# Patient Record
Sex: MALE | Race: WHITE | Employment: OTHER | URBAN - METROPOLITAN AREA
[De-identification: names, ages, dates, MRNs, and addresses within clinical notes are randomized per-mention and may not be internally consistent; named-entity substitution may affect disease eponyms.]

---

## 2017-04-04 ENCOUNTER — OFFICE VISIT (OUTPATIENT)
Dept: ORTHOPEDICS | Facility: CLINIC | Age: 57
End: 2017-04-04
Payer: COMMERCIAL

## 2017-04-04 ENCOUNTER — RADIANT APPOINTMENT (OUTPATIENT)
Dept: GENERAL RADIOLOGY | Facility: CLINIC | Age: 57
End: 2017-04-04
Attending: PEDIATRICS
Payer: COMMERCIAL

## 2017-04-04 VITALS
DIASTOLIC BLOOD PRESSURE: 82 MMHG | HEIGHT: 69 IN | SYSTOLIC BLOOD PRESSURE: 116 MMHG | WEIGHT: 165 LBS | BODY MASS INDEX: 24.44 KG/M2

## 2017-04-04 DIAGNOSIS — M19.272 SECONDARY OSTEOARTHRITIS OF LEFT ANKLE: ICD-10-CM

## 2017-04-04 DIAGNOSIS — M25.572 LEFT ANKLE PAIN, UNSPECIFIED CHRONICITY: ICD-10-CM

## 2017-04-04 DIAGNOSIS — M25.572 LEFT ANKLE PAIN, UNSPECIFIED CHRONICITY: Primary | ICD-10-CM

## 2017-04-04 PROCEDURE — 73610 X-RAY EXAM OF ANKLE: CPT | Mod: LT | Performed by: PEDIATRICS

## 2017-04-04 PROCEDURE — 99203 OFFICE O/P NEW LOW 30 MIN: CPT | Performed by: PEDIATRICS

## 2017-04-04 NOTE — NURSING NOTE
"Chief Complaint   Patient presents with     Musculoskeletal Problem     left ankle injury       Initial /82  Ht 5' 9\" (1.753 m)  Wt 165 lb (74.8 kg)  BMI 24.37 kg/m2 Estimated body mass index is 24.37 kg/(m^2) as calculated from the following:    Height as of this encounter: 5' 9\" (1.753 m).    Weight as of this encounter: 165 lb (74.8 kg).  Medication Reconciliation: complete  "

## 2017-04-04 NOTE — Clinical Note
Case OCASIO was seen in [facility] for his left ankle arthrosis. He prefers to mostly monitor for now. Please see copy of the chart note for additional details. Thanks.
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
## 2017-04-04 NOTE — MR AVS SNAPSHOT
After Visit Summary   4/4/2017    Case Epstein    MRN: [id number]           Patient Information     Date Of Birth          1960        Visit Information        Provider Department      4/4/2017 11:20 AM Rubens Graham,  [facility]        Today's Diagnoses     Left ankle pain, unspecified chronicity    -  1    Secondary osteoarthritis of left ankle          Care Instructions    Crutches and wrap as needed  Ice, elevate as needed  Call if questions--consider injection, therapy, seeing foot/ankle surgeon        Follow-ups after your visit        Follow-up notes from your care team     Return if symptoms worsen or fail to improve.      Your next 10 appointments already scheduled     Apr 05, 2017  6:40 AM CDT   Office Visit with Theresa Galvan PA-C   [facility]    [address] [place] [id number] [phone]           Bring a current list of meds and any records pertaining to this visit.  For Physicals, please bring immunization records and any forms needing to be filled out.  Please arrive 10 minutes early to complete paperwork.              Who to contact     If you have questions or need follow up information about today's clinic visit or your schedule please contact [facility] directly at [phone].  Normal or non-critical lab and imaging results will be communicated to you by MyChart, letter or phone within 4 business days after the clinic has received the results. If you do not hear from us within 7 days, please contact the clinic through MyChart or phone. If you have a critical or abnormal lab result, we will notify you by phone as soon as possible.  Submit refill requests through [employer] or call your pharmacy and they will forward the refill request to us. Please allow 3 business days for your refill to be completed.  "         Additional Information About Your Visit        MyChart Information     [employer] gives you secure access to your electronic health record. If you see a primary care provider, you can also send messages to your care team and make appointments. If you have questions, please call your primary care clinic.  If you do not have a primary care provider, please call [phone] and they will assist you.        Care EveryWhere ID     This is your Care EveryWhere ID. This could be used by other organizations to access your [place] medical records  [id number]        Your Vitals Were     Height BMI (Body Mass Index)                5' 9\" (1.753 m) 24.37 kg/m2           Blood Pressure from Last 3 Encounters:   04/04/17 116/82   07/22/16 123/83   07/03/15 122/81    Weight from Last 3 Encounters:   04/04/17 165 lb (74.8 kg)   07/22/16 158 lb (71.7 kg)   07/03/15 169 lb (76.7 kg)              We Performed the Following     ELASTIC COMPRESSION BANDAGE          Today's Medication Changes          These changes are accurate as of: 4/4/17  1:11 PM.  If you have any questions, ask your nurse or doctor.               Start taking these medicines.        Dose/Directions    order for DME   Used for:  Left ankle pain, unspecified chronicity, Secondary osteoarthritis of left ankle   Started by:  Rubens Graham, DO        Equipment being ordered: crutches, adult   Quantity:  1 Device   Refills:  0            Where to get your medicines      Some of these will need a paper prescription and others can be bought over the counter.  Ask your nurse if you have questions.     Bring a paper prescription for each of these medications     order for DME                Primary Care Provider Office Phone # Fax #    Tushar Granados [phone]       [facility] [address] [facility] [address]        Thank you!     Thank you for choosing [facility]  for your " care. Our goal is always to provide you with excellent care. Hearing back from our patients is one way we can continue to improve our services. Please take a few minutes to complete the written survey that you may receive in the mail after your visit with us. Thank you!             Your Updated Medication List - Protect others around you: Learn how to safely use, store and throw away your medicines at www.disposemymeds.org.          This list is accurate as of: 4/4/17  1:11 PM.  Always use your most recent med list.                   Brand Name Dispense Instructions for use    ALEVE 220 MG tablet   Generic drug:  naproxen sodium      Take 2 tablets by mouth as needed.       aspirin 81 MG tablet      Take 1 tablet by mouth daily.       CALTRATE 600 PLUS-VIT D OR      Daily       MULTIVITAMIN TABS   OR      1 TABLET DAILY       order for DME     1 Device    Equipment being ordered: crutches, adult       vitamin D 1000 UNITS capsule      1 CAPSULE DAILY

## 2017-04-04 NOTE — PATIENT INSTRUCTIONS
Crutches and wrap as needed  Ice, elevate as needed  Call if questions--consider injection, therapy, seeing foot/ankle surgeon

## 2017-06-28 ENCOUNTER — TELEPHONE (OUTPATIENT)
Dept: ORTHOPEDICS | Facility: CLINIC | Age: 57
End: 2017-06-28

## 2017-06-28 NOTE — TELEPHONE ENCOUNTER
LVM for patient to call back to reschedule injection for US guided inection  Radha Peraza MS ATC

## 2017-06-28 NOTE — TELEPHONE ENCOUNTER
LVM for patient has he is scheduled for an US guided injection with Dr. Graham, and he would need to be rescheduled.  Dr. Lewis is available on Saturday 7/17/17  Radha Peraza MS, ATC

## 2017-07-12 ENCOUNTER — OFFICE VISIT (OUTPATIENT)
Dept: ORTHOPEDICS | Facility: CLINIC | Age: 57
End: 2017-07-12
Payer: COMMERCIAL

## 2017-07-12 VITALS
DIASTOLIC BLOOD PRESSURE: 82 MMHG | WEIGHT: 165 LBS | BODY MASS INDEX: 24.44 KG/M2 | HEIGHT: 69 IN | SYSTOLIC BLOOD PRESSURE: 122 MMHG

## 2017-07-12 DIAGNOSIS — M25.572 LEFT ANKLE PAIN, UNSPECIFIED CHRONICITY: Primary | ICD-10-CM

## 2017-07-12 DIAGNOSIS — M19.272 SECONDARY OSTEOARTHRITIS OF LEFT ANKLE: ICD-10-CM

## 2017-07-12 PROCEDURE — 20611 DRAIN/INJ JOINT/BURSA W/US: CPT | Mod: LT | Performed by: FAMILY MEDICINE

## 2017-07-12 RX ORDER — TRIAMCINOLONE ACETONIDE 40 MG/ML
40 INJECTION, SUSPENSION INTRA-ARTICULAR; INTRAMUSCULAR ONCE
Qty: 1 ML | Refills: 0 | OUTPATIENT
Start: 2017-07-12 | End: 2017-07-12

## 2017-07-12 NOTE — MR AVS SNAPSHOT
"              After Visit Summary   7/12/2017    Case Epstein    MRN: [id number]           Patient Information     Date Of Birth          1960        Visit Information        Provider Department      7/12/2017 2:20 PM Rubens Reynoso DO [facility]        Today's Diagnoses     Left ankle pain, unspecified chronicity    -  1    Secondary osteoarthritis of left ankle           Follow-ups after your visit        Who to contact     If you have questions or need follow up information about today's clinic visit or your schedule please contact [facility] directly at [phone].  Normal or non-critical lab and imaging results will be communicated to you by [employer], letter or phone within 4 business days after the clinic has received the results. If you do not hear from us within 7 days, please contact the clinic through [employer] or phone. If you have a critical or abnormal lab result, we will notify you by phone as soon as possible.  Submit refill requests through [employer] or call your pharmacy and they will forward the refill request to us. Please allow 3 business days for your refill to be completed.          Additional Information About Your Visit        MyChart Information     [employer] gives you secure access to your electronic health record. If you see a primary care provider, you can also send messages to your care team and make appointments. If you have questions, please call your primary care clinic.  If you do not have a primary care provider, please call [phone] and they will assist you.        Care EveryWhere ID     This is your Care EveryWhere ID. This could be used by other organizations to access your [place] medical records  [id number]        Your Vitals Were     Height BMI (Body Mass Index)                5' 9\" (1.753 m) 24.37 kg/m2           Blood Pressure from Last 3 Encounters:   07/12/17 122/82   04/04/17 116/82 "   07/22/16 123/83    Weight from Last 3 Encounters:   07/12/17 165 lb (74.8 kg)   04/04/17 165 lb (74.8 kg)   07/22/16 158 lb (71.7 kg)              We Performed the Following     HC ARTHROCENTESIS ASPIR&/INJ MAJOR JT/BURSA W/US     TRIAMCINOLONE ACET INJ NOS          Today's Medication Changes          These changes are accurate as of: 7/12/17  3:09 PM.  If you have any questions, ask your nurse or doctor.               Start taking these medicines.        Dose/Directions    triamcinolone acetonide 40 MG/ML injection   Commonly known as:  KENALOG-40   Used for:  Left ankle pain, unspecified chronicity, Secondary osteoarthritis of left ankle   Started by:  Rubens Reynoso DO        Dose:  40 mg   1 mL (40 mg) by INTRA-ARTICULAR route once for 1 dose   Quantity:  1 mL   Refills:  0            Where to get your medicines      Some of these will need a paper prescription and others can be bought over the counter.  Ask your nurse if you have questions.     You don't need a prescription for these medications     triamcinolone acetonide 40 MG/ML injection                Primary Care Provider Office Phone # Fax #    Tushar Granados [phone]       [address] [facility] [address]        Equal Access to Services     JOVAN MATT AH: Marino leoso Soamparoali, waaxda luqadaha, qaybta kaalmada adeegyada, carmencita easton. So [facility] [phone].    ATENCIÓN: Si habla español, tiene a cifuentes disposición servicios gratuitos de asistencia lingüística. Llame al [phone].    We comply with applicable federal civil rights laws and [place] laws. We do not discriminate on the basis of race, color, national origin, age, disability sex, sexual orientation or gender identity.            Thank you!     Thank you for choosing [facility]  for your care. Our goal is always to provide you with excellent care.  Hearing back from our patients is one way we can continue to improve our services. Please take a few minutes to complete the written survey that you may receive in the mail after your visit with us. Thank you!             Your Updated Medication List - Protect others around you: Learn how to safely use, store and throw away your medicines at www.disposemymeds.org.          This list is accurate as of: 7/12/17  3:09 PM.  Always use your most recent med list.                   Brand Name Dispense Instructions for use Diagnosis    ALEVE 220 MG tablet   Generic drug:  naproxen sodium      Take 2 tablets by mouth as needed.        aspirin 81 MG tablet      Take 1 tablet by mouth daily.        CALTRATE 600 PLUS-VIT D OR      Daily        MULTIVITAMIN TABS   OR      1 TABLET DAILY        order for DME     1 Device    Equipment being ordered: crutches, adult    Left ankle pain, unspecified chronicity, Secondary osteoarthritis of left ankle       triamcinolone acetonide 40 MG/ML injection    KENALOG-40    1 mL    1 mL (40 mg) by INTRA-ARTICULAR route once for 1 dose    Left ankle pain, unspecified chronicity, Secondary osteoarthritis of left ankle       vitamin D 1000 UNITS capsule      1 CAPSULE DAILY

## 2017-07-12 NOTE — PROGRESS NOTES
Case Epstein  :  1960  DOS: 2017  MRN: [id number]    Sports Medicine Clinic Procedure    Ultrasound Guided Left Intra-Articular Ankle Injection    Clinical History: Patient is here for chronic left ankle pain.  He walks on outside of left foot with ankle slightly inverted.  Previously seen by Dr Graham on 17 d/t increased pain following a fall.  He continues to have mild-moderate discomfort and is desiring injection today, as directed by Dr Graham.    Diagnosis:   1. Left ankle pain, unspecified chronicity    2. Secondary osteoarthritis of left ankle      Referring Physician: Rubens Graham D.O.  Technique: The risks of the procedure were explained to the patient.  A consent was signed for the intra-articular ankle injection.  The patient was evaluated with a [employer] ultrasound machine using a 12 MHz linear probe.     The Left ankle was prepped and draped in a sterile manner.  Ultrasound identification of the ankle joint in short axis to the lower leg and the probe was positioned in this manner.  A 1.5 inch 25 gauge needle was placed under ultrasound guidance into the ankle joint.  A mixture of 2mL's 1% lidocaine and 1 mL 40 mg kenalog was injected without difficulty on short axis view.  The needle was removed and there was good hemostasis without complications.  There was ultrasound documentation of needle placement and injection.  Pre-procedural pain 7/10.  Post procedural pain 4/10.    Impression:  Successful Left intra-articular ankle injection.    Plan:  Follow with Dr Graham as previously discussed.  Call in 2 weeks if no benefit  Expectations and goals of CSI reviewed  Often 2-3 days for steroid effect, and can take up to two weeks for maximum effect  We discussed modified progressive pain-free activity as tolerated  Do not overuse in first two weeks if feeling better due to concern for vulnerability while steroid is working  Supportive care reviewed  All questions were  answered today  Contact us with additional questions or concerns  Signs and sx of concern reviewed      Rubens Reynoso DO, DONNA  Primary Care Sports Medicine  [facility]

## 2017-07-12 NOTE — NURSING NOTE
"Chief Complaint   Patient presents with     Musculoskeletal Problem     chronic left ankle pain - US injection       Initial /82  Ht 5' 9\" (1.753 m)  Wt 165 lb (74.8 kg)  BMI 24.37 kg/m2 Estimated body mass index is 24.37 kg/(m^2) as calculated from the following:    Height as of this encounter: 5' 9\" (1.753 m).    Weight as of this encounter: 165 lb (74.8 kg).  Medication Reconciliation: complete     Finesse Parnell, ATC  "

## 2017-07-25 ENCOUNTER — MYC MEDICAL ADVICE (OUTPATIENT)
Dept: FAMILY MEDICINE | Facility: CLINIC | Age: 57
End: 2017-07-25

## 2017-07-25 DIAGNOSIS — M25.572 LEFT ANKLE PAIN, UNSPECIFIED CHRONICITY: Primary | ICD-10-CM

## 2017-07-26 NOTE — TELEPHONE ENCOUNTER
Please see MyChart message below, patient looking for advice on surgery.    Routed to PCP.    Olive eDng RN  [facility]

## 2017-07-27 ENCOUNTER — MYC MEDICAL ADVICE (OUTPATIENT)
Dept: ORTHOPEDICS | Facility: CLINIC | Age: 57
End: 2017-07-27

## 2017-08-01 NOTE — TELEPHONE ENCOUNTER
My Chart message from patient:    I decided to contact  Dr. Linn at [facility].  ----- Message -----  From: Tushar Granados MD  Sent: 7/27/2017  6:56 AM CDT  To: Case Epstein  Subject: RE: Updates about my health    Hi Mr Epstein  Yes I agree with seeing a foot/ankle surgeon, at least for their opinion.  I did a referral.  Two good options are Dr Aguila at [facility] [phone] or Dr. Linn at [facility] [phone].  If the sports medicine doctor has other recommendations that is okay also.  I did look at the xrays, lots of arthritis.  Take care  Tushar Granados MD

## 2017-08-03 ENCOUNTER — MYC MEDICAL ADVICE (OUTPATIENT)
Dept: FAMILY MEDICINE | Facility: CLINIC | Age: 57
End: 2017-08-03

## 2017-08-09 ENCOUNTER — MYC MEDICAL ADVICE (OUTPATIENT)
Dept: FAMILY MEDICINE | Facility: CLINIC | Age: 57
End: 2017-08-09

## 2017-08-10 NOTE — TELEPHONE ENCOUNTER
Patient states wife dropped off CRUZ on Monday 8/7. Patient on his way to  now.  Thank you,  Joann ZHANG.  Patient Rep.  [facility]

## 2017-08-16 ENCOUNTER — OFFICE VISIT (OUTPATIENT)
Dept: FAMILY MEDICINE | Facility: CLINIC | Age: 57
End: 2017-08-16
Payer: COMMERCIAL

## 2017-08-16 VITALS
OXYGEN SATURATION: 98 % | BODY MASS INDEX: 24.48 KG/M2 | HEIGHT: 70 IN | HEART RATE: 79 BPM | DIASTOLIC BLOOD PRESSURE: 88 MMHG | TEMPERATURE: 97.7 F | SYSTOLIC BLOOD PRESSURE: 131 MMHG | WEIGHT: 171 LBS

## 2017-08-16 DIAGNOSIS — Z00.00 ROUTINE GENERAL MEDICAL EXAMINATION AT A HEALTH CARE FACILITY: Primary | ICD-10-CM

## 2017-08-16 DIAGNOSIS — R73.01 IMPAIRED FASTING GLUCOSE: ICD-10-CM

## 2017-08-16 DIAGNOSIS — Z13.6 CARDIOVASCULAR SCREENING; LDL GOAL LESS THAN 100: ICD-10-CM

## 2017-08-16 DIAGNOSIS — Z12.5 SCREENING FOR PROSTATE CANCER: ICD-10-CM

## 2017-08-16 DIAGNOSIS — Z86.0100 HISTORY OF COLONIC POLYPS: ICD-10-CM

## 2017-08-16 DIAGNOSIS — R53.83 FATIGUE, UNSPECIFIED TYPE: ICD-10-CM

## 2017-08-16 LAB
ALBUMIN SERPL-MCNC: 4.1 G/DL (ref 3.4–5)
ALP SERPL-CCNC: 71 U/L (ref 40–150)
ALT SERPL W P-5'-P-CCNC: 24 U/L (ref 0–70)
ANION GAP SERPL CALCULATED.3IONS-SCNC: 9 MMOL/L (ref 3–14)
AST SERPL W P-5'-P-CCNC: 16 U/L (ref 0–45)
BASOPHILS # BLD AUTO: 0 10E9/L (ref 0–0.2)
BASOPHILS NFR BLD AUTO: 0.4 %
BILIRUB SERPL-MCNC: 0.6 MG/DL (ref 0.2–1.3)
BUN SERPL-MCNC: 15 MG/DL (ref 7–30)
CALCIUM SERPL-MCNC: 9 MG/DL (ref 8.5–10.1)
CHLORIDE SERPL-SCNC: 104 MMOL/L (ref 94–109)
CHOLEST SERPL-MCNC: 190 MG/DL
CO2 SERPL-SCNC: 25 MMOL/L (ref 20–32)
CREAT SERPL-MCNC: 0.82 MG/DL (ref 0.66–1.25)
DIFFERENTIAL METHOD BLD: NORMAL
EOSINOPHIL # BLD AUTO: 0.1 10E9/L (ref 0–0.7)
EOSINOPHIL NFR BLD AUTO: 1 %
ERYTHROCYTE [DISTWIDTH] IN BLOOD BY AUTOMATED COUNT: 13.9 % (ref 10–15)
GFR SERPL CREATININE-BSD FRML MDRD: >90 ML/MIN/1.7M2
GLUCOSE SERPL-MCNC: 93 MG/DL (ref 70–99)
HBA1C MFR BLD: 5.2 % (ref 4.3–6)
HCT VFR BLD AUTO: 45.1 % (ref 40–53)
HDLC SERPL-MCNC: 50 MG/DL
HGB BLD-MCNC: 15 G/DL (ref 13.3–17.7)
LDLC SERPL CALC-MCNC: 113 MG/DL
LYMPHOCYTES # BLD AUTO: 1.5 10E9/L (ref 0.8–5.3)
LYMPHOCYTES NFR BLD AUTO: 21.3 %
MCH RBC QN AUTO: 29.8 PG (ref 26.5–33)
MCHC RBC AUTO-ENTMCNC: 33.3 G/DL (ref 31.5–36.5)
MCV RBC AUTO: 90 FL (ref 78–100)
MONOCYTES # BLD AUTO: 0.7 10E9/L (ref 0–1.3)
MONOCYTES NFR BLD AUTO: 10.2 %
NEUTROPHILS # BLD AUTO: 4.8 10E9/L (ref 1.6–8.3)
NEUTROPHILS NFR BLD AUTO: 67.1 %
NONHDLC SERPL-MCNC: 140 MG/DL
PLATELET # BLD AUTO: 193 10E9/L (ref 150–450)
POTASSIUM SERPL-SCNC: 4.3 MMOL/L (ref 3.4–5.3)
PROT SERPL-MCNC: 7.4 G/DL (ref 6.8–8.8)
PSA SERPL-ACNC: 0.46 UG/L (ref 0–4)
RBC # BLD AUTO: 5.03 10E12/L (ref 4.4–5.9)
SODIUM SERPL-SCNC: 138 MMOL/L (ref 133–144)
TRIGL SERPL-MCNC: 134 MG/DL
TSH SERPL DL<=0.005 MIU/L-ACNC: 1.92 MU/L (ref 0.4–4)
WBC # BLD AUTO: 7.2 10E9/L (ref 4–11)

## 2017-08-16 PROCEDURE — 99396 PREV VISIT EST AGE 40-64: CPT | Performed by: FAMILY MEDICINE

## 2017-08-16 PROCEDURE — G0103 PSA SCREENING: HCPCS | Performed by: FAMILY MEDICINE

## 2017-08-16 PROCEDURE — 80061 LIPID PANEL: CPT | Performed by: FAMILY MEDICINE

## 2017-08-16 PROCEDURE — 83036 HEMOGLOBIN GLYCOSYLATED A1C: CPT | Performed by: FAMILY MEDICINE

## 2017-08-16 PROCEDURE — 36415 COLL VENOUS BLD VENIPUNCTURE: CPT | Performed by: FAMILY MEDICINE

## 2017-08-16 PROCEDURE — 80050 GENERAL HEALTH PANEL: CPT | Performed by: FAMILY MEDICINE

## 2017-08-16 ASSESSMENT — PAIN SCALES - GENERAL: PAINLEVEL: NO PAIN (0)

## 2017-08-16 NOTE — PROGRESS NOTES
SUBJECTIVE:   CC: Case Epstein is an 57 year old male who presents for preventative health visit.     Physical   Annual:     Getting at least 3 servings of Calcium per day::  Yes    Bi-annual eye exam::  NO    Dental care twice a year::  NO    Sleep apnea or symptoms of sleep apnea::  None    Frequency of exercise::  None    Taking medications regularly::  Yes    Medication side effects::  Not applicable    Additional concerns today::  No    Lab work needed.    To see specialist today for mri results    Ankle very limiting    Not much exercise    No prescription meds     Started tumeric  Supposed to help joints     Also on joint juice        Ann Cunha MA      Today's PHQ-2 Score: PHQ-2 ( 1999 [employer]) 8/13/2017   Q1: Little interest or pleasure in doing things 0   Q2: Feeling down, depressed or hopeless 0   PHQ-2 Score 0   Q1: Little interest or pleasure in doing things Not at all   Q2: Feeling down, depressed or hopeless Not at all   PHQ-2 Score 0       Abuse: Current or Past(Physical, Sexual or Emotional)- No  Do you feel safe in your environment - Yes    Social History   Substance Use Topics     Smoking status: Never Smoker     Smokeless tobacco: Never Used     Alcohol use No      Comment: occasional     The patient does not drink >3 drinks per day nor >7 drinks per week.    Last PSA:   PSA   Date Value Ref Range Status   07/22/2016 0.36 0 - 4 ug/L Final       Reviewed orders with patient. Reviewed health maintenance and updated orders accordingly - Yes       Reviewed and updated as needed this visit by clinical staff  Tobacco  Allergies  Meds  Med Hx  Surg Hx  Fam Hx  Soc Hx        Reviewed and updated as needed this visit by Provider              ROS:  C: NEGATIVE for fever, chills, change in weight  I: NEGATIVE for worrisome rashes, moles or lesions  E: NEGATIVE for vision changes or irritation  ENT: NEGATIVE for ear, mouth and throat problems  R: NEGATIVE for significant cough or SOB  CV:  "NEGATIVE for chest pain, palpitations or peripheral edema  GI: NEGATIVE for nausea, abdominal pain, heartburn, or change in bowel habits   male: negative for dysuria, hematuria, decreased urinary stream, erectile dysfunction, urethral discharge  MUSCULOSKELETAL:ongoing left ankle problems.  Some knee oa also.   N: NEGATIVE for weakness, dizziness or paresthesias  P: NEGATIVE for changes in mood or affect    OBJECTIVE:   /88 (BP Location: Left arm, Patient Position: Chair, Cuff Size: Adult Regular)  Pulse 79  Temp 97.7  F (36.5  C) (Oral)  Ht 5' 10\" (1.778 m)  Wt 171 lb (77.6 kg)  SpO2 98%  BMI 24.54 kg/m2    EXAM:  GENERAL: healthy, alert and no distress  HENT: ear canals and TM's normal, nose and mouth without ulcers or lesions  NECK: no adenopathy, no asymmetry, masses, or scars and thyroid normal to palpation  RESP: lungs clear to auscultation - no rales, rhonchi or wheezes  CV: regular rate and rhythm, normal S1 S2, no S3 or S4, no murmur, click or rub, no peripheral edema and peripheral pulses strong  ABDOMEN: soft, nontender, no hepatosplenomegaly, no masses and bowel sounds normal  MS: no gross musculoskeletal defects noted, no edema  SKIN: no suspicious lesions or rashes  NEURO: Normal strength and tone, mentation intact and speech normal  NEURO: limited range of motion of left ankle  PSYCH: mentation appears normal, affect normal/bright    ASSESSMENT/PLAN:   Case was seen today for physical.    Diagnoses and all orders for this visit:    Routine general medical examination at a health care facility    History of colonic polyps  -     GASTROENTEROLOGY ADULT REF PROCEDURE ONLY    CARDIOVASCULAR SCREENING; LDL GOAL LESS THAN 100  -     Lipid panel reflex to direct LDL    Impaired fasting glucose  -     Hemoglobin A1c    Fatigue, unspecified type  -     TSH with free T4 reflex  -     CBC with platelets differential  -     Comprehensive metabolic panel    Screening for prostate cancer  -     " "Prostate spec antigen screen    Discussed multiple issues with patient  With history of polyps, due for colonoscopy  I ordered this.  Urged patient to call and schedule.  He is seeing the specialists in regards to his ankle.  Check labs   Keep working on healthy diet/exercise as able   Not on any prescription meds at this time      COUNSELING:   Reviewed preventive health counseling, as reflected in patient instructions       Regular exercise       Healthy diet/nutrition       Vision screening       Safe sex practices/STD prevention    BP Screening:   Last 3 BP Readings:    BP Readings from Last 3 Encounters:   08/16/17 131/88   07/12/17 122/82   04/04/17 116/82       The following was recommended to the patient:  Re-screen BP within a year and recommended lifestyle modifications       reports that he has never smoked. He has never used smokeless tobacco.      Estimated body mass index is 24.54 kg/(m^2) as calculated from the following:    Height as of this encounter: 5' 10\" (1.778 m).    Weight as of this encounter: 171 lb (77.6 kg).         Counseling Resources:  ATP IV Guidelines  Pooled Cohorts Equation Calculator  FRAX Risk Assessment  ICSI Preventive Guidelines  Dietary Guidelines for Americans, 2010  [employer] MyPlate  ASA Prophylaxis  Lung CA Screening    Tushar Granados MD  [facility] for HPI/ROS submitted by the patient on 8/13/2017   PHQ-2 Score: 0    "

## 2017-08-16 NOTE — PATIENT INSTRUCTIONS
Keep working on healthy diet/exercise as you are able    We will send you lab results    If possible, schedule colonoscopy

## 2017-08-16 NOTE — MR AVS SNAPSHOT
After Visit Summary   8/16/2017    Case Epstein    MRN: [id number]           Patient Information     Date Of Birth          1960        Visit Information        Provider Department      8/16/2017 10:40 AM Tushar Granados MD [facility]        Today's Diagnoses     History of colonic polyps    -  1    CARDIOVASCULAR SCREENING; LDL GOAL LESS THAN 100        Impaired fasting glucose        Fatigue, unspecified type        Screening for prostate cancer          Care Instructions    Keep working on healthy diet/exercise as you are able    We will send you lab results    If possible, schedule colonoscopy           Follow-ups after your visit        Additional Services     GASTROENTEROLOGY ADULT REF PROCEDURE ONLY       Last Lab Result: Creatinine (mg/dL)       Date                     Value                 07/22/2016               0.80             ----------  Body mass index is 24.54 kg/(m^2).     Needed:  No  Language:  English    Patient will be contacted to schedule procedure.     Please be aware that coverage of these services is subject to the terms and limitations of your health insurance plan.  Call member services at your health plan with any benefit or coverage questions.  Any procedures must be performed at a [facility] facility OR coordinated by your clinic's referral office.    Please bring the following with you to your appointment:    (1) Any X-Rays, CTs or MRIs which have been performed.  Contact the facility where they were done to arrange for  prior to your scheduled appointment.    (2) List of current medications   (3) This referral request   (4) Any documents/labs given to you for this referral                  Who to contact     If you have questions or need follow up information about today's clinic visit or your schedule please contact [facility] directly at [phone].  Normal or non-critical lab and imaging  "results will be communicated to you by MyChart, letter or phone within 4 business days after the clinic has received the results. If you do not hear from us within 7 days, please contact the clinic through [employer] or phone. If you have a critical or abnormal lab result, we will notify you by phone as soon as possible.  Submit refill requests through [employer] or call your pharmacy and they will forward the refill request to us. Please allow 3 business days for your refill to be completed.          Additional Information About Your Visit        [employer] Information     [employer] gives you secure access to your electronic health record. If you see a primary care provider, you can also send messages to your care team and make appointments. If you have questions, please call your primary care clinic.  If you do not have a primary care provider, please call [phone] and they will assist you.        Care EveryWhere ID     This is your Care EveryWhere ID. This could be used by other organizations to access your [facility] medical records  [id number]        Your Vitals Were     Pulse Temperature Height Pulse Oximetry BMI (Body Mass Index)       79 97.7  F (36.5  C) (Oral) 5' 10\" (1.778 m) 98% 24.54 kg/m2        Blood Pressure from Last 3 Encounters:   08/16/17 131/88   07/12/17 122/82   04/04/17 116/82    Weight from Last 3 Encounters:   08/16/17 171 lb (77.6 kg)   07/12/17 165 lb (74.8 kg)   04/04/17 165 lb (74.8 kg)              We Performed the Following     CBC with platelets differential     Comprehensive metabolic panel     GASTROENTEROLOGY ADULT REF PROCEDURE ONLY     Hemoglobin A1c     Lipid panel reflex to direct LDL     Prostate spec antigen screen     TSH with free T4 reflex        Primary Care Provider Office Phone # Fax #    Tushar Granados [phone]       [address] [facility] [address]        Equal Access to Services     AMBROCIO MATT : bismark Rodriguez " jonah ramonjorden whelancarmencita helms. So [facility] [phone].    ATENCIÓN: Si nelson jose, tiene a cifuentes disposición servicios gratuitos de asistencia lingüística. Javed al [phone].    We comply with applicable federal civil rights laws and [place] laws. We do not discriminate on the basis of race, color, national origin, age, disability sex, sexual orientation or gender identity.            Thank you!     Thank you for choosing [facility]  for your care. Our goal is always to provide you with excellent care. Hearing back from our patients is one way we can continue to improve our services. Please take a few minutes to complete the written survey that you may receive in the mail after your visit with us. Thank you!             Your Updated Medication List - Protect others around you: Learn how to safely use, store and throw away your medicines at www.disposemymeds.org.          This list is accurate as of: 8/16/17 11:01 AM.  Always use your most recent med list.                   Brand Name Dispense Instructions for use Diagnosis    ALEVE 220 MG tablet   Generic drug:  naproxen sodium      Take 2 tablets by mouth as needed.        aspirin 81 MG tablet      Take 1 tablet by mouth daily.        CALTRATE 600 PLUS-VIT D OR      Daily        MULTIVITAMIN TABS   OR      1 TABLET DAILY        order for DME     1 Device    Equipment being ordered: crutches, adult    Left ankle pain, unspecified chronicity, Secondary osteoarthritis of left ankle       vitamin D 1000 UNITS capsule      1 CAPSULE DAILY

## 2017-08-16 NOTE — NURSING NOTE
"Chief Complaint   Patient presents with     Physical       Initial /88 (BP Location: Left arm, Patient Position: Chair, Cuff Size: Adult Regular)  Pulse 79  Temp 97.7  F (36.5  C) (Oral)  Ht 5' 10\" (1.778 m)  Wt 171 lb (77.6 kg)  SpO2 98%  BMI 24.54 kg/m2 Estimated body mass index is 24.54 kg/(m^2) as calculated from the following:    Height as of this encounter: 5' 10\" (1.778 m).    Weight as of this encounter: 171 lb (77.6 kg).  Medication Reconciliation: complete   Ann Cunha MA       "

## 2017-08-18 NOTE — PROGRESS NOTES
"The LDL \"bad\" cholesterol is moderately high.  No medication needed, just keep working on healthy diet/exercise.    Other labs are all fine.    Tushar Granados MD  "

## 2017-11-10 ENCOUNTER — OFFICE VISIT (OUTPATIENT)
Dept: FAMILY MEDICINE | Facility: CLINIC | Age: 57
End: 2017-11-10
Payer: COMMERCIAL

## 2017-11-10 VITALS
OXYGEN SATURATION: 97 % | BODY MASS INDEX: 24.68 KG/M2 | DIASTOLIC BLOOD PRESSURE: 82 MMHG | WEIGHT: 172 LBS | HEART RATE: 69 BPM | SYSTOLIC BLOOD PRESSURE: 130 MMHG | TEMPERATURE: 96.7 F

## 2017-11-10 DIAGNOSIS — M25.572 CHRONIC PAIN OF LEFT ANKLE: ICD-10-CM

## 2017-11-10 DIAGNOSIS — Z01.818 PREOP GENERAL PHYSICAL EXAM: Primary | ICD-10-CM

## 2017-11-10 DIAGNOSIS — G89.29 CHRONIC PAIN OF LEFT ANKLE: ICD-10-CM

## 2017-11-10 PROCEDURE — 99215 OFFICE O/P EST HI 40 MIN: CPT | Performed by: FAMILY MEDICINE

## 2017-11-10 PROCEDURE — 93005 ELECTROCARDIOGRAM TRACING: CPT | Performed by: FAMILY MEDICINE
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
## 2017-11-10 NOTE — NURSING NOTE
"Chief Complaint   Patient presents with     Pre-Op Exam     knee surgery 11/20       Initial /82 (BP Location: Right arm, Patient Position: Chair, Cuff Size: Adult Regular)  Pulse 69  Temp 96.7  F (35.9  C) (Oral)  Wt 172 lb (78 kg)  SpO2 97%  BMI 24.68 kg/m2 Estimated body mass index is 24.68 kg/(m^2) as calculated from the following:    Height as of 8/16/17: 5' 10\" (1.778 m).    Weight as of this encounter: 172 lb (78 kg).  Medication Reconciliation: complete   Millie See RAAD Ross      "

## 2017-11-10 NOTE — PATIENT INSTRUCTIONS
Before Your Surgery      Call your surgeon if there is any change in your health. This includes signs of a cold or flu (such as a sore throat, runny nose, cough, rash or fever).    Do not smoke, drink alcohol or take over the counter medicine (unless your surgeon or primary care doctor tells you to) for the 24 hours before and after surgery.    If you take prescribed drugs: Follow your doctor s orders about which medicines to take and which to stop until after surgery.    Eating and drinking prior to surgery: follow the instructions from your surgeon    Take a shower or bath the night before surgery. Use the soap your surgeon gave you to gently clean your skin. If you do not have soap from your surgeon, use your regular soap. Do not shave or scrub the surgery site.  Wear clean pajamas and have clean sheets on your bed.         Hold naproxen and aspirin and turmeric for a week prior     Call with problems/ questions    Full glass of water early in am

## 2017-11-10 NOTE — MR AVS SNAPSHOT
After Visit Summary   11/10/2017    Case Epstein    MRN: [id number]           Patient Information     Date Of Birth          1960        Visit Information        Provider Department      11/10/2017 8:00 AM Tushar Granados MD [facility]        Today's Diagnoses     Preop general physical exam    -  1    Chronic pain of left ankle          Care Instructions      Before Your Surgery      Call your surgeon if there is any change in your health. This includes signs of a cold or flu (such as a sore throat, runny nose, cough, rash or fever).    Do not smoke, drink alcohol or take over the counter medicine (unless your surgeon or primary care doctor tells you to) for the 24 hours before and after surgery.    If you take prescribed drugs: Follow your doctor s orders about which medicines to take and which to stop until after surgery.    Eating and drinking prior to surgery: follow the instructions from your surgeon    Take a shower or bath the night before surgery. Use the soap your surgeon gave you to gently clean your skin. If you do not have soap from your surgeon, use your regular soap. Do not shave or scrub the surgery site.  Wear clean pajamas and have clean sheets on your bed.         Hold naproxen and aspirin and turmeric for a week prior     Call with problems/ questions    Full glass of water early in am           Follow-ups after your visit        Your next 10 appointments already scheduled     Nov 20, 2017   Procedure with Shaggy Linn MD   [facility] (--)    [address]  [facility] [id number] [phone]              Who to contact     If you have questions or need follow up information about today's clinic visit or your schedule please contact [facility] directly at [phone].  Normal or non-critical lab and imaging results will be communicated to you by MyChart, letter or phone within 4  business days after the clinic has received the results. If you do not hear from us within 7 days, please contact the clinic through [employer] or phone. If you have a critical or abnormal lab result, we will notify you by phone as soon as possible.  Submit refill requests through [employer] or call your pharmacy and they will forward the refill request to us. Please allow 3 business days for your refill to be completed.          Additional Information About Your Visit        [employer] Information     [employer] gives you secure access to your electronic health record. If you see a primary care provider, you can also send messages to your care team and make appointments. If you have questions, please call your primary care clinic.  If you do not have a primary care provider, please call [phone] and they will assist you.        Care EveryWhere ID     This is your Care EveryWhere ID. This could be used by other organizations to access your [facility] medical records  [id number]        Your Vitals Were     Pulse Temperature Pulse Oximetry BMI (Body Mass Index)          69 96.7  F (35.9  C) (Oral) 97% 24.68 kg/m2         Blood Pressure from Last 3 Encounters:   11/10/17 130/82   08/16/17 131/88   07/12/17 122/82    Weight from Last 3 Encounters:   11/10/17 172 lb (78 kg)   08/16/17 171 lb (77.6 kg)   07/12/17 165 lb (74.8 kg)              We Performed the Following     EKG 12-lead, tracing only        Primary Care Provider Office Phone # Fax #    Tushar Granados [phone]       [address] [facility] [address]        Equal Access to Services     [facility]: Hadii aad ku hadasho Soomaali, waaxda luqadaha, qaybta kaalmada carmencita ng . So [facility] [phone].    ATENCIÓN: Si habla español, tiene a cifuentes disposición servicios gratuitos de asistencia lingüística. Llame al [phone].    We comply with applicable federal civil rights laws and [place] laws.  We do not discriminate on the basis of race, color, national origin, age, disability, sex, sexual orientation, or gender identity.            Thank you!     Thank you for choosing [facility]  for your care. Our goal is always to provide you with excellent care. Hearing back from our patients is one way we can continue to improve our services. Please take a few minutes to complete the written survey that you may receive in the mail after your visit with us. Thank you!             Your Updated Medication List - Protect others around you: Learn how to safely use, store and throw away your medicines at www.disposemymeds.org.          This list is accurate as of: 11/10/17  8:34 AM.  Always use your most recent med list.                   Brand Name Dispense Instructions for use Diagnosis    ALEVE 220 MG tablet   Generic drug:  naproxen sodium      Take 2 tablets by mouth as needed.        aspirin 81 MG tablet      Take 1 tablet by mouth daily.        CALTRATE 600 PLUS-VIT D OR      Daily        KRILL OIL PO      Take 300 mg by mouth        MULTIVITAMIN TABS   OR      1 TABLET DAILY        order for DME     1 Device    Equipment being ordered: crutches, adult    Left ankle pain, unspecified chronicity, Secondary osteoarthritis of left ankle       TURMERIC PO      Take 1,050 mg by mouth        UNABLE TO FIND      MEDICATION NAME: One A Day        VITAMIN B 12 PO      Take 2,500 mg by mouth        vitamin D 1000 UNITS capsule      1 CAPSULE DAILY        VITAMIN E PO

## 2017-11-13 DIAGNOSIS — Z01.818 PRE-OP EXAM: Primary | ICD-10-CM

## 2017-11-13 PROCEDURE — 93010 ELECTROCARDIOGRAM REPORT: CPT | Performed by: INTERNAL MEDICINE
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
## 2017-11-17 RX ORDER — MULTIPLE VITAMINS W/ MINERALS TAB 9MG-400MCG
1 TAB ORAL DAILY
COMMUNITY
End: 2021-04-02

## 2017-11-20 ENCOUNTER — HOSPITAL ENCOUNTER (OUTPATIENT)
Facility: CLINIC | Age: 57
Discharge: HOME | End: 2017-11-20
Attending: ORTHOPAEDIC SURGERY | Admitting: ORTHOPAEDIC SURGERY
Payer: COMMERCIAL

## 2017-11-20 ENCOUNTER — APPOINTMENT (OUTPATIENT)
Dept: GENERAL RADIOLOGY | Facility: CLINIC | Age: 57
End: 2017-11-20
Attending: ORTHOPAEDIC SURGERY
Payer: COMMERCIAL

## 2017-11-20 ENCOUNTER — ANESTHESIA EVENT (OUTPATIENT)
Dept: SURGERY | Facility: CLINIC | Age: 57
End: 2017-11-20
Payer: COMMERCIAL

## 2017-11-20 ENCOUNTER — ANESTHESIA (OUTPATIENT)
Dept: SURGERY | Facility: CLINIC | Age: 57
End: 2017-11-20
Payer: COMMERCIAL

## 2017-11-20 VITALS
OXYGEN SATURATION: 98 % | RESPIRATION RATE: 16 BRPM | WEIGHT: 172 LBS | SYSTOLIC BLOOD PRESSURE: 136 MMHG | TEMPERATURE: 97.3 F | DIASTOLIC BLOOD PRESSURE: 90 MMHG | BODY MASS INDEX: 24.62 KG/M2 | HEIGHT: 70 IN

## 2017-11-20 DIAGNOSIS — Z98.1 S/P ANKLE FUSION: Primary | ICD-10-CM

## 2017-11-20 PROCEDURE — 71000027 ZZH RECOVERY PHASE 2 EACH 15 MINS: Performed by: ORTHOPAEDIC SURGERY

## 2017-11-20 PROCEDURE — 40000277 XR SURGERY CARM FLUORO LESS THAN 5 MIN W STILLS

## 2017-11-20 PROCEDURE — 27210794 ZZH OR GENERAL SUPPLY STERILE: Performed by: ORTHOPAEDIC SURGERY

## 2017-11-20 PROCEDURE — 25000128 H RX IP 250 OP 636: Performed by: ANESTHESIOLOGY

## 2017-11-20 PROCEDURE — 36000060 ZZH SURGERY LEVEL 3 W FLUORO 1ST 30 MIN: Performed by: ORTHOPAEDIC SURGERY

## 2017-11-20 PROCEDURE — 25000128 H RX IP 250 OP 636: Performed by: NURSE ANESTHETIST, CERTIFIED REGISTERED

## 2017-11-20 PROCEDURE — 27110028 ZZH OR GENERAL SUPPLY NON-STERILE: Performed by: ORTHOPAEDIC SURGERY

## 2017-11-20 PROCEDURE — 37000008 ZZH ANESTHESIA TECHNICAL FEE, 1ST 30 MIN: Performed by: ORTHOPAEDIC SURGERY

## 2017-11-20 PROCEDURE — 25000128 H RX IP 250 OP 636: Performed by: PHYSICIAN ASSISTANT

## 2017-11-20 PROCEDURE — 37000009 ZZH ANESTHESIA TECHNICAL FEE, EACH ADDTL 15 MIN: Performed by: ORTHOPAEDIC SURGERY

## 2017-11-20 PROCEDURE — 27211024 ZZHC OR SUPPLY OTHER OPNP: Performed by: ORTHOPAEDIC SURGERY

## 2017-11-20 PROCEDURE — C1713 ANCHOR/SCREW BN/BN,TIS/BN: HCPCS | Performed by: ORTHOPAEDIC SURGERY

## 2017-11-20 PROCEDURE — 36000058 ZZH SURGERY LEVEL 3 EA 15 ADDTL MIN: Performed by: ORTHOPAEDIC SURGERY

## 2017-11-20 PROCEDURE — 25000566 ZZH SEVOFLURANE, EA 15 MIN: Performed by: ORTHOPAEDIC SURGERY

## 2017-11-20 PROCEDURE — 25000125 ZZHC RX 250: Performed by: NURSE ANESTHETIST, CERTIFIED REGISTERED

## 2017-11-20 PROCEDURE — 40000170 ZZH STATISTIC PRE-PROCEDURE ASSESSMENT II: Performed by: ORTHOPAEDIC SURGERY

## 2017-11-20 PROCEDURE — 71000012 ZZH RECOVERY PHASE 1 LEVEL 1 FIRST HR: Performed by: ORTHOPAEDIC SURGERY

## 2017-11-20 DEVICE — IMP SCR ARTHREX BONE LOCKING LP 26X4.5MM TI AR-8545L-26: Type: IMPLANTABLE DEVICE | Site: ANKLE | Status: FUNCTIONAL

## 2017-11-20 DEVICE — IMP PLATE ARTHREX ANKLE FUSION ANTERIOR TT LT AR-8970AL: Type: IMPLANTABLE DEVICE | Site: ANKLE | Status: FUNCTIONAL

## 2017-11-20 DEVICE — IMPLANTABLE DEVICE: Type: IMPLANTABLE DEVICE | Site: ANKLE | Status: FUNCTIONAL

## 2017-11-20 DEVICE — IMP SCR ARTHREX BONE LOCKING LP 34X4.5MM TI AR-8545L-34: Type: IMPLANTABLE DEVICE | Site: ANKLE | Status: FUNCTIONAL

## 2017-11-20 DEVICE — IMP SCR ARTHREX BONE LOCKING LP 30X4.5MM TI AR-8545L-30: Type: IMPLANTABLE DEVICE | Site: ANKLE | Status: FUNCTIONAL

## 2017-11-20 DEVICE — IMP SCR ARTHREX BONE LOW PROFILE 30X4.5MM TI AR-8545-30: Type: IMPLANTABLE DEVICE | Site: ANKLE | Status: FUNCTIONAL

## 2017-11-20 DEVICE — IMP SCR ARTHREX BONE LOCKING LP 20X4.5MM TI AR-8545L-20: Type: IMPLANTABLE DEVICE | Site: ANKLE | Status: FUNCTIONAL

## 2017-11-20 RX ORDER — LIDOCAINE HYDROCHLORIDE 20 MG/ML
INJECTION, SOLUTION INFILTRATION; PERINEURAL PRN
Status: DISCONTINUED | OUTPATIENT
Start: 2017-11-20 | End: 2017-11-20

## 2017-11-20 RX ORDER — HYDROMORPHONE HYDROCHLORIDE 1 MG/ML
.3-.5 INJECTION, SOLUTION INTRAMUSCULAR; INTRAVENOUS; SUBCUTANEOUS EVERY 10 MIN PRN
Status: DISCONTINUED | OUTPATIENT
Start: 2017-11-20 | End: 2017-11-20 | Stop reason: HOSPADM

## 2017-11-20 RX ORDER — ONDANSETRON 4 MG/1
4 TABLET, ORALLY DISINTEGRATING ORAL EVERY 30 MIN PRN
Status: DISCONTINUED | OUTPATIENT
Start: 2017-11-20 | End: 2017-11-20 | Stop reason: HOSPADM

## 2017-11-20 RX ORDER — ONDANSETRON 2 MG/ML
INJECTION INTRAMUSCULAR; INTRAVENOUS PRN
Status: DISCONTINUED | OUTPATIENT
Start: 2017-11-20 | End: 2017-11-20

## 2017-11-20 RX ORDER — FENTANYL CITRATE 50 UG/ML
25-50 INJECTION, SOLUTION INTRAMUSCULAR; INTRAVENOUS
Status: DISCONTINUED | OUTPATIENT
Start: 2017-11-20 | End: 2017-11-20 | Stop reason: HOSPADM

## 2017-11-20 RX ORDER — MEPERIDINE HYDROCHLORIDE 25 MG/ML
12.5 INJECTION INTRAMUSCULAR; INTRAVENOUS; SUBCUTANEOUS
Status: DISCONTINUED | OUTPATIENT
Start: 2017-11-20 | End: 2017-11-20 | Stop reason: HOSPADM

## 2017-11-20 RX ORDER — FENTANYL CITRATE 50 UG/ML
25-50 INJECTION, SOLUTION INTRAMUSCULAR; INTRAVENOUS EVERY 5 MIN PRN
Status: DISCONTINUED | OUTPATIENT
Start: 2017-11-20 | End: 2017-11-20 | Stop reason: HOSPADM

## 2017-11-20 RX ORDER — SODIUM CHLORIDE, SODIUM LACTATE, POTASSIUM CHLORIDE, CALCIUM CHLORIDE 600; 310; 30; 20 MG/100ML; MG/100ML; MG/100ML; MG/100ML
INJECTION, SOLUTION INTRAVENOUS CONTINUOUS
Status: DISCONTINUED | OUTPATIENT
Start: 2017-11-20 | End: 2017-11-20 | Stop reason: HOSPADM

## 2017-11-20 RX ORDER — ONDANSETRON 4 MG/1
4 TABLET, ORALLY DISINTEGRATING ORAL
Status: DISCONTINUED | OUTPATIENT
Start: 2017-11-20 | End: 2017-11-20 | Stop reason: HOSPADM

## 2017-11-20 RX ORDER — CEFAZOLIN SODIUM 2 G/100ML
2 INJECTION, SOLUTION INTRAVENOUS
Status: COMPLETED | OUTPATIENT
Start: 2017-11-20 | End: 2017-11-20

## 2017-11-20 RX ORDER — ONDANSETRON 2 MG/ML
4 INJECTION INTRAMUSCULAR; INTRAVENOUS EVERY 30 MIN PRN
Status: DISCONTINUED | OUTPATIENT
Start: 2017-11-20 | End: 2017-11-20 | Stop reason: HOSPADM

## 2017-11-20 RX ORDER — FENTANYL CITRATE 50 UG/ML
INJECTION, SOLUTION INTRAMUSCULAR; INTRAVENOUS PRN
Status: DISCONTINUED | OUTPATIENT
Start: 2017-11-20 | End: 2017-11-20

## 2017-11-20 RX ORDER — NALOXONE HYDROCHLORIDE 0.4 MG/ML
.1-.4 INJECTION, SOLUTION INTRAMUSCULAR; INTRAVENOUS; SUBCUTANEOUS
Status: DISCONTINUED | OUTPATIENT
Start: 2017-11-20 | End: 2017-11-20 | Stop reason: HOSPADM

## 2017-11-20 RX ORDER — HYDROXYZINE HYDROCHLORIDE 25 MG/1
25 TABLET, FILM COATED ORAL EVERY 6 HOURS PRN
Qty: 30 TABLET | Refills: 0 | Status: SHIPPED | OUTPATIENT
Start: 2017-11-20 | End: 2018-08-17

## 2017-11-20 RX ORDER — AMOXICILLIN 250 MG
1-2 CAPSULE ORAL 2 TIMES DAILY
Qty: 30 TABLET | Refills: 0 | Status: SHIPPED | OUTPATIENT
Start: 2017-11-20 | End: 2018-08-17

## 2017-11-20 RX ORDER — SODIUM CHLORIDE, SODIUM LACTATE, POTASSIUM CHLORIDE, CALCIUM CHLORIDE 600; 310; 30; 20 MG/100ML; MG/100ML; MG/100ML; MG/100ML
INJECTION, SOLUTION INTRAVENOUS CONTINUOUS PRN
Status: DISCONTINUED | OUTPATIENT
Start: 2017-11-20 | End: 2017-11-20

## 2017-11-20 RX ORDER — PROPOFOL 10 MG/ML
INJECTION, EMULSION INTRAVENOUS CONTINUOUS PRN
Status: DISCONTINUED | OUTPATIENT
Start: 2017-11-20 | End: 2017-11-20

## 2017-11-20 RX ORDER — PROPOFOL 10 MG/ML
INJECTION, EMULSION INTRAVENOUS PRN
Status: DISCONTINUED | OUTPATIENT
Start: 2017-11-20 | End: 2017-11-20

## 2017-11-20 RX ORDER — OXYCODONE AND ACETAMINOPHEN 5; 325 MG/1; MG/1
1 TABLET ORAL
Status: DISCONTINUED | OUTPATIENT
Start: 2017-11-20 | End: 2017-11-20 | Stop reason: HOSPADM

## 2017-11-20 RX ORDER — OXYCODONE AND ACETAMINOPHEN 5; 325 MG/1; MG/1
1-2 TABLET ORAL EVERY 4 HOURS PRN
Qty: 40 TABLET | Refills: 0 | Status: SHIPPED | OUTPATIENT
Start: 2017-11-20 | End: 2018-08-17

## 2017-11-20 RX ADMIN — FENTANYL CITRATE 50 MCG: 50 INJECTION INTRAMUSCULAR; INTRAVENOUS at 15:46

## 2017-11-20 RX ADMIN — MIDAZOLAM HYDROCHLORIDE 2 MG: 1 INJECTION, SOLUTION INTRAMUSCULAR; INTRAVENOUS at 14:18

## 2017-11-20 RX ADMIN — ROPIVACAINE HYDROCHLORIDE 50 ML: 5 INJECTION, SOLUTION EPIDURAL; INFILTRATION; PERINEURAL at 12:57

## 2017-11-20 RX ADMIN — SODIUM CHLORIDE, POTASSIUM CHLORIDE, SODIUM LACTATE AND CALCIUM CHLORIDE: 600; 310; 30; 20 INJECTION, SOLUTION INTRAVENOUS at 15:15

## 2017-11-20 RX ADMIN — FENTANYL CITRATE 50 MCG: 50 INJECTION, SOLUTION INTRAMUSCULAR; INTRAVENOUS at 14:30

## 2017-11-20 RX ADMIN — LIDOCAINE HYDROCHLORIDE 60 MG: 20 INJECTION, SOLUTION INFILTRATION; PERINEURAL at 14:18

## 2017-11-20 RX ADMIN — SODIUM CHLORIDE, POTASSIUM CHLORIDE, SODIUM LACTATE AND CALCIUM CHLORIDE: 600; 310; 30; 20 INJECTION, SOLUTION INTRAVENOUS at 12:55

## 2017-11-20 RX ADMIN — FENTANYL CITRATE 50 MCG: 50 INJECTION, SOLUTION INTRAMUSCULAR; INTRAVENOUS at 14:18

## 2017-11-20 RX ADMIN — DEXMEDETOMIDINE HYDROCHLORIDE 8 MCG: 100 INJECTION, SOLUTION INTRAVENOUS at 14:32

## 2017-11-20 RX ADMIN — PROPOFOL 200 MG: 10 INJECTION, EMULSION INTRAVENOUS at 14:17

## 2017-11-20 RX ADMIN — PROPOFOL 180 MCG/KG/MIN: 10 INJECTION, EMULSION INTRAVENOUS at 14:19

## 2017-11-20 RX ADMIN — ONDANSETRON 4 MG: 2 INJECTION INTRAMUSCULAR; INTRAVENOUS at 15:15

## 2017-11-20 RX ADMIN — CEFAZOLIN SODIUM 2 G: 2 INJECTION, SOLUTION INTRAVENOUS at 14:20

## 2017-11-20 RX ADMIN — FENTANYL CITRATE 50 MCG: 50 INJECTION INTRAMUSCULAR; INTRAVENOUS at 15:52

## 2017-11-20 RX ADMIN — PROPOFOL 180 MCG/KG/MIN: 10 INJECTION, EMULSION INTRAVENOUS at 14:53

## 2017-11-20 NOTE — OP NOTE
DATE OF PROCEDURE:  11/20/2017      PREOPERATIVE DIAGNOSES:     1.  Very severe rigid deformity of the left ankle with 45 degree varus deformity and 30 degrees equinus.     2.  Plantar flexion contracture and left hallux rigidus.      POSTOPERATIVE DIAGNOSES:     1.  Very severe rigid deformity of the left ankle with 45 degree varus deformity and 30 degrees equinus.     2.  Plantar flexion contracture and left hallux rigidus.      PROCEDURES:   1.  Corrective osteotomy through the left ankle with left ankle fusion.   2.  Removal of a very large exostosis of the fibula and subtalar debridement.   3.  Cheilectomy and debridement of the left great toe metatarsophalangeal joint.   4.  Jessica dorsiflexion osteotomy of the proximal phalanx, left great toe.      SURGEON:  Shaggy Linn MD      ASSISTANT:   RERE MANCERA      ANESTHESIA:  General.      PREAMBLE:  Mr. Case Epstein presented with about a 45 degree varus and 30 degrees equinus contracture which was completely rigid.  There was no movement with attempt at passive reduction.      He also has a plantar flexion contracture of his great toe at about 30 degrees.  He has a very large exostosis of the lateral aspect of the distal fibula as well as the lateral aspect of the talus.      Informed consent was obtained for the above-mentioned procedures.      Two grams of Ancef was given prior to surgery.  There is no need for postoperative antibiotic prophylaxis.      DESCRIPTION OF PROCEDURE:  After adequate induction of a general anesthetic, the patient was positioned supine on the operating table.  The left leg was sterilely prepped and free draped in the usual fashion.  Tourniquet around the thigh was inflated to 300 mmHg.  A 15 cm curvilinear incision was made lateral over the ankle.  The fibula was exposed.  A saw was used to do a generous cheilectomy of the large exostosis over the distal fibula.  This bone will be morcellized for autologous bone graft.   There were large osteophytes around the subtalar joint that were also removed with an osteotome.  It was still not possible to reduce the talus into the mortise.      A 15 cm midline anterior incision was used over the ankle.  The interval between extensor hallucis longus and tibialis anterior was used for the approach.  The extensor hallucis longus was completely scarred down into the midfoot.  A complete deltoid release was done.  The medial malleolar osteotomy was done to help reduce the talus into the mortise.  A saw was used to create lateral wedge on the tibia and talus and the corresponding area out of the tibia.  This was an extremely complex biplane reconstruction to get the talus rotated back onto the tibia.      The fibula osteotomy was also necessary to help with rotational deformity.      With the foot mobile in all directions it was possible to reduce the talus under the tibia and immobilize it with an anterior tibial plate with multiple screws.  Excellent alignment and fixation was obtained.      Tourniquet was deflated.  Hemostasis obtained.  The wounds closed in layers.  A sterile dressing, light compressive bandage and a short leg cast were applied.  He tolerated the procedure well and was taken to the recovery room in satisfactory condition.      He can ambulate partial weightbearing with crutches.  Sutures will be removed in 2 weeks.         MANNY BARLOW MD             D: 2017 15:31   T: 2017 17:07   MT: KAELYN#[id number]      Name:     BLAKE MUSE   MRN:      [id number]        Account:        [id number]   :      1960           Procedure Date: 2017      Document: [id number]

## 2017-11-20 NOTE — OR NURSING
awaoken w tactile and auditory stimuli- follows basic commands- nasal airway removed by Ashley CRNA- RR 14- O2 sats 99% w face mask FiO2 10 ltres- denies SOB or resp distress or any other c/o.

## 2017-11-20 NOTE — ANESTHESIA CARE TRANSFER NOTE
Patient: Case Epstein    Procedure(s):  LEFT ANKLE FUSION (C-ARM) - Wound Class: I-Clean    Diagnosis: left ankle djd   Diagnosis Additional Information: No value filed.    Anesthesia Type:   General, LMA, Periph. Nerve Block for postop pain     Note:  Airway :Face Mask  Patient transferred to:PACU  Handoff Report: Identifed the Patient, Identified the Reponsible Provider, Reviewed the pertinent medical history, Discussed the surgical course, Reviewed Intra-OP anesthesia mangement and issues during anesthesia, Set expectations for post-procedure period and Allowed opportunity for questions and acknowledgement of understanding   responding, good exchange, report to RN    Vitals: (Last set prior to Anesthesia Care Transfer)    CRNA VITALS  11/20/2017 1459 - 11/20/2017 1542      11/20/2017             Pulse: 87    SpO2: 98 %    Resp Rate (set): 10          BP: 140/85      Electronically Signed By: NATTY Khan CRNA  November 20, 2017  3:42 PM

## 2017-11-20 NOTE — IP AVS SNAPSHOT
MRN:[id number]                      After Visit Summary   11/20/2017    Case Epstein    MRN: [id number]           Thank you!     Thank you for choosing [facility] for your care. Our goal is always to provide you with excellent care. Hearing back from our patients is one way we can continue to improve our services. Please take a few minutes to complete the written survey that you may receive in the mail after you visit with us. Thank you!        Patient Information     Date Of Birth          1960        About your hospital stay     You were admitted on:  November 20, 2017 You last received care in the:  [facility]    You were discharged on:  November 20, 2017       Who to Call     For medical emergencies, please call 911.  For non-urgent questions about your medical care, please call your primary care provider or clinic, [phone]  For questions related to your surgery, please call your surgery clinic        Attending Provider     Provider Specialty    Shaggy Linn MD Orthopaedic Surgery       Primary Care Provider Office Phone # Fax #    Tushar Granados [phone]      After Care Instructions     Activity       Ambulate with assistance until independent            Discharge Instructions       Review discharge instructions as directed by Provider.            Discharge Instructions       Patient to arrange for return to clinic appointment in two weeks.            Elevate affected extremity           Ice to affected area       Ice pack to affected area PRN (as needed).            No dressing change       until follow up clinic appointment.            No weight bearing           Weight bearing status - Toe touch                 Further instructions from your care team       If your bladder area becomes painful and distended and you are unable to urinate then  report to hospital Emergency Room    Same Day Surgery Discharge Instructions  for  Sedation and General Anesthesia       It's not unusual to feel dizzy, light-headed or faint for up to 24 hours after surgery or while taking pain medication.  If you have these symptoms: sit for a few minutes before standing and have someone assist you when you get up to walk or use the bathroom.      You should rest and relax for the next 24 hours. We recommend you make arrangements to have an adult stay with you for at least 24 hours after your discharge.  Avoid hazardous and strenuous activity.      DO NOT DRIVE any vehicle or operate mechanical equipment for 24 hours following the end of your surgery.  Even though you may feel normal, your reactions may be affected by the medication you have received.      Do not drink alcoholic beverages for 24 hours following surgery.       Slowly progress to your regular diet as you feel able. It's not unusual to feel nauseated and/or vomit after receiving anesthesia.  If you develop these symptoms, drink clear liquids (apple juice, ginger ale, broth, 7-up, etc. ) until you feel better.  If your nausea and vomiting persists for 24 hours, please notify your surgeon.        All narcotic pain medications, along with inactivity and anesthesia, can cause constipation. Drinking plenty of liquids and increasing fiber intake will help.      For any questions of a medical nature, call your surgeon.      Do not make important decisions for 24 hours.      If you had general anesthesia, you may have a sore throat for a couple of days related to the breathing tube used during surgery.  You may use Cepacol lozenges to help with this discomfort.  If it worsens or if you develop a fever, contact your surgeon.       If you feel your pain is not well managed with the pain medications prescribed by your surgeon, please contact your surgeon's office to let them know so they can address your concerns.           [facility]      DISCHARGE INSTRUCTIONS FOLLOWING   REGIONAL BLOCK  "ANESTHESIA      Numbness or lack of feeling in the arm/leg that was operated may last up to 24 hours.  The average time is usually 10-15 hours.  You may not be able to lift or move the arm or leg where the operation was by itself during that time.  Long-acting local anesthetic medicines were used to give you long-lasting pain relief.    Wear a sling until your arm is completely \"awake\"    Avoid bumping your arm, leg or foot while it is numb    Avoid extremes of hot or cold while it is numb    Remain quiet and restful the day of surgery.  Resume normal activities gradually over the next day or so as advise by your surgeon.    Do not drive or operate  Any machinery until your extremity is full  \"awake\"        You will have a tingling and prickly sensation in your arm/leg as the feeling begins to return; you can also expect some discomfort. The amount of discomfort is unpredictable, but if you have more pain that can be controlled with the pain medication you received, you should contact your surgeon.  Start to take your pain pills as soon as you start to feel any discomfort or pain.  We strongly recommend starting your pain medication at bedtime if you haven't already done so.  This is in the event that the block wears off while you are asleep.                      POST-OPERATIVE INSTRUCTIONS  FOOT AND ANKLE SURGERY  YANA Linn M.D.  Leonid Fuentes P.A.-C    These precautions MUST be followed for the first 24 hours after surgery:    Upon discharge, go directly home.    You must make arrangements to have a responsible adult stay with you.    DO NOT DRINK ALCOHOLIC BEVERAGES    It is not unusual to feel lightheaded up to 24 hours after surgery or while taking pain medication.  If you feel lightheaded, sit for a few minutes before standing and have someone assisted you when you get up to walk or use the restroom.    Do not use any mechanical equipment.    Do not make any important or legal decisions for 24 hours " or while on pain medications.    You may experience dry mouth, sore throat, or sleep disturbances from the anesthesia and medications used during surgery.  Generalized muscle aches can sometimes occur.  These usually disappear in 12-24 hours.    POST-OPERATIVE CARE GUIDELINES    The following are general guidelines about what to expect the first days/weeks after surgery.  They are not specific, and your recovery may be slightly different.    Blood clots are not common, but are emergencies.  If you have sudden onset pain in your calf or leg, or have sudden shortness of breath, go to the Emergency Department.      Elevation of your operative foot/ankle is key to reducing the swelling in the immediate post-operative phase (first 3-5 days).  When you are at rest, elevate your foot at or above the level of your heart.  When sitting, your foot should be elevated on a chair or stool; not hanging down.      You should plan to rest the day after surgery no matter how minor the procedure.  More complicated procedures will require more time to return to normal activity.      Foot and ankle surgery is painful in most cases - use your medication as directed for the first 24 hours after surgery.  It is not unusual for the pain to be worse a day or two after surgery than on the day of.  If your pain is more than 8/10 contact our office.  If you don t have pain, gradually decrease your pain meds by substituting Tylenol.  Please don t use Advil/ibuprofen unless we order it for you.      You will be prescribed Percocet or Vicodin for pain, Vistaril for spasms/pain adjunct, Senokot for constipation.  If you have had trouble taking these meds before or experience nausea or vomiting after surgery from your medication, please advise the recovery room nurses.  If you are already at home, try drinking only clear liquids and/or call our office.      All pain medications, along with inactivity can cause constipation.  Use the Senakot as  directed, increase fluid intake to 1 quart per day and increase your dietary fiber.  (The  P  fruits - peaches, plums, pears, and prunes as well as anise/black licorice are recommended.)    It is not unusual to run a low-grade fever after surgery.  If your temperature is elevated above 102 , lasts longer than 24 hours, or is questionable in any way, contact our office.      Drainage from your cast/dressing is normal.  Reinforce your cast/dressing with 4x4 dressings and cover with an Ace wrap.  Wait until 24 hours after surgery to change your dressings; by this time most of your bleeding will have stopped.  If you have drainage through your dressings 2 days after surgery, contact our office.      You cast/dressing will be changed at your 2-week follow up appointment.  They should be kept clean and DRY.  If your cast/dressing is damaged before that, contact our office.      It is normal to experience some bruising in the toes after surgery and they may appear  dark  when your foot hangs down.  It is important to actively wiggle your toes for at least 5-10 minutes each hour UNLESS you had surgery on those toes.      Use ice on your foot/ankle over the dressing/cast for 20 minutes per hour, 10 or more times per day.  A large bag of frozen peas works well.      Bathing: take a tub or sponge bath instead of a shower if possible during the first two weeks.  If you choose to shower, cover the dressing/cast with a waterproof covering, these may be ordered from www.seal-tight.com or are available at some pharmacies/medical supply stores.  Another option is XeroSox, which is the original vacuum sealed bandage and cast cover.  The cast cover has a vacuum seal and is absolutely waterproof.  1-512-039-5340 or www.xerosox.com for more information.      Driving:  For surgery on the left foot/ankle, you may drive as soon as narcotic medications are stopped.  For surgery on the right foot/ankle, you may drive when you are out of a  cast, off pain medications, and you feel secure with braking.      In general, listen to your foot/ankle.  If you have a sudden, dramatic increase in pain that does not resolve after an hour or so, something is wrong - call our office.  If you fall or bump your foot/ankle and have sudden pain that resolves, give it 24 hours before you call.      Many of your questions can be addressed at your 2-week follow-up appointment - please make a list of things to ask us as they come up during your recovery.      If you had a nerve block it is common to have numbness in your leg and foot for up to 30 hours after surgery.  If your leg or foot is still numb more than 30 hours after surgery, please call the office.    FUTURE DENTIST VISITS:  If you have had a total ankle arthroplasty please be advised you must be on antibiotics prior to ANY dental work.  Please call your dentist office ahead of time and make them aware of this as your dentist will be able to order the prescription.  If you have had any other surgery this is not a concern.      Crutch Instructions      Because of your surgery you will need crutches.  Make sure you tell your healthcare provider if crutches do not seem to work for you.  Using Crutches   Crutches are the most commonly used device for injuries to the lower part of the body because they allow the most mobility. All walking assistance devices require strength in your upper body but crutches require more coordination. If you are unable to use crutches then a cane or walker may be better.   Remember these rules when using crutches:   Always look straight ahead when you walk. Do not look down.   Hold the top padded part of the crutches into your chest near your armpits. Grasp the padded hand  and always support your weight with your arms and hands.   Do not lean on the crutches with your armpit as this could cause nerve damage.  Standing Up  Make sure you are in a stable chair. Move forward to the  "edge of the chair so that your  good  foot is flat on the floor. Put both crutches together and hold the handgrips in one hand. Stretch the injured leg out straight and then use the crutches with one hand and the chair armrest with the other hand to push yourself into a standing position onto your  good  leg. Once you are standing, wait until you are steady before placing a crutch in each hand. Until you become good at standing, have someone assist you in case you lose your balance. When standing still, lean slightly forward with the crutches ahead of you and about 3 feet apart. Unless you are told otherwise, you should keep weight off your injured leg.  Walking  To begin crutch walking, balance yourself on your  good  leg. Move both crutches forward about the length of one step and place them firmly on the floor in front of you about 3 feet apart. Support your weight on the padded hand rests while leaning forward. Press the top of the crutches against your chest wall and not into your armpits. Be sure to hold the injured leg up off of the floor. When ready, swing the \"good\" leg forward about one step length past the crutches while supporting your weight on the padded hand . Be careful not to go too far. Transfer your weight onto the \"good\" leg then bring both crutches forward about the length of one step. Repeating this motion will allow you to move fairly quickly without the use of your injured leg. Keep practicing until you become good at crutch walking.  Sitting Down  Make sure the chair you are about to sit on is stable. Walk in front of the chair such that you are facing away from it. Move back a little at a time until the back of your  good  leg is nearly touching the seat. Keeping your weight on the  good  leg, move both crutches to one hand holding onto the handgrips. Lean forward, bend your  good  knee, and move your injured leg out forward. Sit down slowly while using your free hand to reach for the " "chair s armrest for support. Place the crutches where you can easily get them. Never pivot, even on your  good  leg. This could cause you to fall or injure your  good  leg.  Navigating Stairs, Curbs & Door Steps  Only attempt this once you are very comfortable with regular crutch walking and only when someone is there to steady you should you begin to lose your balance. Hold on to a  railing with one hand and both crutches in the other hand or have someone carry the loose crutch for you. It does not matter which side the handrail is on. If there is no handrail, you can use both crutches. Using only crutches is the same as the railing method but maintaining your balance can be difficult. The crutch-only method is not recommended until you have become very good at crutch walking. Be sure to only take one step at a time. A simple rule to remember for crutches when navigating stairs or curbs: up with the  good  leg and down with the  bad .  Going Up Stairs or Curbs  Walk close to the first step with the crutches slightly behind you. Push down on the handrail and the crutch and step up with the \"good\" leg. You may have to make a short hop to do this if you are not allowed to place any weight on the injured leg. Lean forward and bring the injured leg and the crutch up beside the \"good\" leg. Repeat this until you have climbed up all of the steps. Remember, the \"good\" leg goes up first and the crutches move with the injured leg. The person helping you should stand behind and to your side that is away from the railing.  Going Down Stairs or Curbs  Walk to the edge of the first step. While standing and balancing on the  good  leg, place both crutches in one hand and then down on the step below. Be sure to support your weight by leaning on the crutches and handrail. Bring the injured leg forward, then the \"good\" leg down to the same step as the crutches. Remember crutches go down first with the injured leg. The " "person helping you should  front and to your side that is away from the railing.  Sitting Method for Navigating Stairs  A safer way to get up and down stairs is to sit down. To go up steps, sit down on the second or third step. Push with your  good  leg below while pushing up with both arms on the step above to move your bottom to the next step. When you get to the top of the stairs you may need to use the  scooting  method described later to get back up. To go down steps you first need to lower yourself to the floor near the top of the steps. Once seated, support yourself with your  good  leg on the second to next step below, and push with both arms on the step where you are sitting to move your bottom down to the next step. When you reach the bottom, pull yourself up to standing position using your crutches. It is helpful if someone can move your crutches and assist you in getting up and down. With practice it may be possible to manage on your own.  If You Start To Fall  If you start to fall and cannot get your balance, throw the crutches away from you. Try to fall onto your  good  side away from your injury and then break your fall with your arms. If uninjured, you can usually get back up by moving into a sitting position and scooting to a chair. Push with your arms and hands on the chair seat while pushing with the \"good\" leg to get up into the chair. You should not attempt to get back up if you are having significant pain. Call for assistance or dial 911 for an ambulance if necessary.  Safety Tips for Crutch Walking   At first you may want to wear a training belt (or a strong regular belt) so others can assist you.   Do not use crutches if you feel dizzy or drowsy.   Be careful on slick or wet surfaces like a kitchen or bathroom floor, snow, ice, or rainy conditions.   Temporarily remove pets, throw rugs or other items from your home that might trip you.   Do not hop around while holding onto furniture. " "  Wear sneakers or rubber soled shoes that will not easily slip or come off.   Be careful on ramps or slopes as they can be harder to walk up or down   Do not remove any parts from your crutches especially the padding or rubber traction footings. Replace padding or footings that become damaged immediately.   It is important to use your crutches correctly. If you feel any numbness or tingling in your arms, you are probably using the crutches incorrectly.  Helpful Hints   A bedside toilet or toilet riser may be helpful.   Always allow for extra time to get around. Children in school should be given permission to leave class early to avoid crowds when changing classes.   Elevate the injured leg when sitting.   Use a backpack to carry books or waist pouch to carry other items so that both hands are free.   Call your healthcare provider if you have any questions or concerns.            If your cast is still oozing blood tomorrow evening then contact Dr Linn's office to have your dressing change done Wednesday    f you have any further questions or concerns, please call our office at [phone]        Pending Results     Date and Time Order Name Status Description    11/20/2017 1516 XR Surgery JASON L/T 5 Min Fluoro w Stills In process             Admission Information     Date & Time Provider Department Dept. Phone    11/20/2017 Shaggy Linn MD [facility] Same Day Surgery [phone]      Your Vitals Were     Blood Pressure Temperature Respirations Height Weight Pulse Oximetry    149/109 97.3  F (36.3  C) (Temporal) 18 1.778 m (5' 10\") 78 kg (172 lb) 97%    BMI (Body Mass Index)                   24.68 kg/m2           MyChart Information     [employer] gives you secure access to your electronic health record. If you see a primary care provider, you can also send messages to your care team and make appointments. If you have questions, please call your primary care clinic.  If you do not have a primary " care provider, please call [phone] and they will assist you.        Care EveryWhere ID     This is your Care EveryWhere ID. This could be used by other organizations to access your [place] medical records  [id number]        Equal Access to Services     AMBROCIO MATT : Hadii aad ku haddayokrys Sosara, wagladysda luqadaha, qaybta kaalmada hernandez, carmencita glenn tategaudencio mayer charuterri easton. So [facility] [phone].    ATENCIÓN: Si habla español, tiene a cifuentes disposición servicios gratuitos de asistencia lingüística. Llame al [phone].    We comply with applicable federal civil rights laws and [place] laws. We do not discriminate on the basis of race, color, national origin, age, disability, sex, sexual orientation, or gender identity.               Review of your medicines      START taking        Dose / Directions    hydrOXYzine 25 MG tablet   Commonly known as:  ATARAX   Used for:  S/P ankle fusion        Dose:  25 mg   Take 1 tablet (25 mg) by mouth every 6 hours as needed for itching (and nausea)   Quantity:  30 tablet   Refills:  0       oxyCODONE-acetaminophen 5-325 MG per tablet   Commonly known as:  PERCOCET   Used for:  S/P ankle fusion        Dose:  1-2 tablet   Take 1-2 tablets by mouth every 4 hours as needed for pain (moderate to severe)   Quantity:  40 tablet   Refills:  0       senna-docusate 8.6-50 MG per tablet   Commonly known as:  SENOKOT-S;PERICOLACE   Used for:  S/P ankle fusion        Dose:  1-2 tablet   Take 1-2 tablets by mouth 2 times daily Take while on oral narcotics to prevent or treat constipation.   Quantity:  30 tablet   Refills:  0         CONTINUE these medicines which have NOT CHANGED        Dose / Directions    aspirin 81 MG tablet        Dose:  1 tablet   Take 1 tablet by mouth daily.   Refills:  0       BOOST PO        Refills:  0       KRILL OIL PO        Dose:  300 mg   Take 300 mg by mouth   Refills:  0       multivitamin, therapeutic with minerals Tabs tablet        Dose:  1  tablet   Take 1 tablet by mouth daily   Refills:  0       order for DME   Used for:  Left ankle pain, unspecified chronicity, Secondary osteoarthritis of left ankle        Equipment being ordered: crutches, adult   Quantity:  1 Device   Refills:  0       OVER-THE-COUNTER        Dose:  1 Dose   Take 1 Dose by mouth daily Joint Juice   Refills:  0       TURMERIC PO        Dose:  1050 mg   Take 1,050 mg by mouth   Refills:  0       VIACTIV PO        Dose:  1 chew tab   Take 1 chew tab by mouth daily   Refills:  0       VITAMIN B 12 PO        Dose:  2500 mg   Take 2,500 mg by mouth   Refills:  0       VITAMIN E PO        Dose:  1000 Units   Take 1,000 Units by mouth daily   Refills:  0         STOP taking     NAPROXEN PO                Where to get your medicines      These medications were sent to [facility] - [address]  [address], [place] [address]     Phone:  [phone]     hydrOXYzine 25 MG tablet    senna-docusate 8.6-50 MG per tablet         Some of these will need a paper prescription and others can be bought over the counter. Ask your nurse if you have questions.     Bring a paper prescription for each of these medications     oxyCODONE-acetaminophen 5-325 MG per tablet                Protect others around you: Learn how to safely use, store and throw away your medicines at www.disposemymeds.org.             Medication List: This is a list of all your medications and when to take them. Check marks below indicate your daily home schedule. Keep this list as a reference.      Medications           Morning Afternoon Evening Bedtime As Needed    aspirin 81 MG tablet   Take 1 tablet by mouth daily.                                BOOST PO                                hydrOXYzine 25 MG tablet   Commonly known as:  ATARAX   Take 1 tablet (25 mg) by mouth every 6 hours as needed for itching (and nausea)                                KRILL OIL PO   Take 300 mg by  mouth                                multivitamin, therapeutic with minerals Tabs tablet   Take 1 tablet by mouth daily                                order for DME   Equipment being ordered: crutches, adult                                OVER-THE-COUNTER   Take 1 Dose by mouth daily Joint Juice                                oxyCODONE-acetaminophen 5-325 MG per tablet   Commonly known as:  PERCOCET   Take 1-2 tablets by mouth every 4 hours as needed for pain (moderate to severe)                                senna-docusate 8.6-50 MG per tablet   Commonly known as:  SENOKOT-S;PERICOLACE   Take 1-2 tablets by mouth 2 times daily Take while on oral narcotics to prevent or treat constipation.                                TURMERIC PO   Take 1,050 mg by mouth                                VIACTIV PO   Take 1 chew tab by mouth daily                                VITAMIN B 12 PO   Take 2,500 mg by mouth                                VITAMIN E PO   Take 1,000 Units by mouth daily

## 2017-11-20 NOTE — OR NURSING
While getting dressed- blood oozing through cast t at heel/ankle area- Dr Linn's PA Leonid at bedside- reinforced with 4X4- abd and ace bandage- OK to discharge to home- added discharge instructions for pt and family to reinforce dressings PRN and if still oozing blood by tomorrow evening- to notify Dr Yee office for dressing change to be done Wednesday-PNDS met, po per I&O sheet. Pt dressed, up in recliner and transported to Phase 2.

## 2017-11-20 NOTE — ANESTHESIA POSTPROCEDURE EVALUATION
Patient: Case Epstein    Procedure(s):  LEFT ANKLE FUSION (C-ARM) - Wound Class: I-Clean    Diagnosis:left ankle djd   Diagnosis Additional Information: No value filed.    Anesthesia Type:  General, LMA, Periph. Nerve Block for postop pain    Note:  Anesthesia Post Evaluation    Patient location during evaluation: PACU  Patient participation: Able to fully participate in evaluation  Level of consciousness: awake  Pain management: adequate  Airway patency: patent  Cardiovascular status: acceptable  Respiratory status: acceptable  Hydration status: acceptable  PONV: none     Anesthetic complications: None          Last vitals:  Vitals:    11/20/17 1545 11/20/17 1557 11/20/17 1600   BP: (!) 141/100 (!) 147/105 (!) 147/105   Resp: 11 14 8   Temp: 35.8  C (96.4  F) 35.9  C (96.6  F) 35.8  C (96.4  F)   SpO2: 99% 92% 96%         Electronically Signed By: Heber Dutton MD  November 20, 2017  4:14 PM

## 2017-11-20 NOTE — ANESTHESIA PROCEDURE NOTES
Procedures  Left popliteal and saphenous blocks for pain relief at surgeon's request.  With the patient in the prone position, after IV started,  pulse oximeter in place and Versed 2mg IV: 22 GA Stimuplex and 25 GA; 50cc (40 + 10) 1/2 % Ropivicaine; 1;200,000 Epi   Depth 2.5cm, easily injected

## 2017-11-20 NOTE — IP AVS SNAPSHOT
[facility]    [address]    [place] [address]    Phone:  [phone]    Fax:  [phone]                                       After Visit Summary   11/20/2017    Case Epstein    MRN: [id number]           After Visit Summary Signature Page     I have received my discharge instructions, and my questions have been answered. I have discussed any challenges I see with this plan with the nurse or doctor.    ..........................................................................................................................................  Patient/Patient Representative Signature      ..........................................................................................................................................  Patient Representative Print Name and Relationship to Patient    ..................................................               ................................................  Date                                            Time    ..........................................................................................................................................  Reviewed by Signature/Title    ...................................................              ..............................................  Date                                                            Time

## 2017-11-20 NOTE — DISCHARGE INSTRUCTIONS
If your bladder area becomes painful and distended and you are unable to urinate then  report to hospital Emergency Room    Same Day Surgery Discharge Instructions for  Sedation and General Anesthesia       It's not unusual to feel dizzy, light-headed or faint for up to 24 hours after surgery or while taking pain medication.  If you have these symptoms: sit for a few minutes before standing and have someone assist you when you get up to walk or use the bathroom.      You should rest and relax for the next 24 hours. We recommend you make arrangements to have an adult stay with you for at least 24 hours after your discharge.  Avoid hazardous and strenuous activity.      DO NOT DRIVE any vehicle or operate mechanical equipment for 24 hours following the end of your surgery.  Even though you may feel normal, your reactions may be affected by the medication you have received.      Do not drink alcoholic beverages for 24 hours following surgery.       Slowly progress to your regular diet as you feel able. It's not unusual to feel nauseated and/or vomit after receiving anesthesia.  If you develop these symptoms, drink clear liquids (apple juice, ginger ale, broth, 7-up, etc. ) until you feel better.  If your nausea and vomiting persists for 24 hours, please notify your surgeon.        All narcotic pain medications, along with inactivity and anesthesia, can cause constipation. Drinking plenty of liquids and increasing fiber intake will help.      For any questions of a medical nature, call your surgeon.      Do not make important decisions for 24 hours.      If you had general anesthesia, you may have a sore throat for a couple of days related to the breathing tube used during surgery.  You may use Cepacol lozenges to help with this discomfort.  If it worsens or if you develop a fever, contact your surgeon.       If you feel your pain is not well managed with the pain medications prescribed by your surgeon, please contact  "your surgeon's office to let them know so they can address your concerns.           [facility]      DISCHARGE INSTRUCTIONS FOLLOWING   REGIONAL BLOCK ANESTHESIA      Numbness or lack of feeling in the arm/leg that was operated may last up to 24 hours.  The average time is usually 10-15 hours.  You may not be able to lift or move the arm or leg where the operation was by itself during that time.  Long-acting local anesthetic medicines were used to give you long-lasting pain relief.    Wear a sling until your arm is completely \"awake\"    Avoid bumping your arm, leg or foot while it is numb    Avoid extremes of hot or cold while it is numb    Remain quiet and restful the day of surgery.  Resume normal activities gradually over the next day or so as advise by your surgeon.    Do not drive or operate  Any machinery until your extremity is full  \"awake\"        You will have a tingling and prickly sensation in your arm/leg as the feeling begins to return; you can also expect some discomfort. The amount of discomfort is unpredictable, but if you have more pain that can be controlled with the pain medication you received, you should contact your surgeon.  Start to take your pain pills as soon as you start to feel any discomfort or pain.  We strongly recommend starting your pain medication at bedtime if you haven't already done so.  This is in the event that the block wears off while you are asleep.                      POST-OPERATIVE INSTRUCTIONS  FOOT AND ANKLE SURGERY  YANA Linn M.D.  Leonid Fuentes P.A.-C    These precautions MUST be followed for the first 24 hours after surgery:    Upon discharge, go directly home.    You must make arrangements to have a responsible adult stay with you.    DO NOT DRINK ALCOHOLIC BEVERAGES    It is not unusual to feel lightheaded up to 24 hours after surgery or while taking pain medication.  If you feel lightheaded, sit for a few minutes before standing and have " someone assisted you when you get up to walk or use the restroom.    Do not use any mechanical equipment.    Do not make any important or legal decisions for 24 hours or while on pain medications.    You may experience dry mouth, sore throat, or sleep disturbances from the anesthesia and medications used during surgery.  Generalized muscle aches can sometimes occur.  These usually disappear in 12-24 hours.    POST-OPERATIVE CARE GUIDELINES    The following are general guidelines about what to expect the first days/weeks after surgery.  They are not specific, and your recovery may be slightly different.    Blood clots are not common, but are emergencies.  If you have sudden onset pain in your calf or leg, or have sudden shortness of breath, go to the Emergency Department.      Elevation of your operative foot/ankle is key to reducing the swelling in the immediate post-operative phase (first 3-5 days).  When you are at rest, elevate your foot at or above the level of your heart.  When sitting, your foot should be elevated on a chair or stool; not hanging down.      You should plan to rest the day after surgery no matter how minor the procedure.  More complicated procedures will require more time to return to normal activity.      Foot and ankle surgery is painful in most cases - use your medication as directed for the first 24 hours after surgery.  It is not unusual for the pain to be worse a day or two after surgery than on the day of.  If your pain is more than 8/10 contact our office.  If you don t have pain, gradually decrease your pain meds by substituting Tylenol.  Please don t use Advil/ibuprofen unless we order it for you.      You will be prescribed Percocet or Vicodin for pain, Vistaril for spasms/pain adjunct, Senokot for constipation.  If you have had trouble taking these meds before or experience nausea or vomiting after surgery from your medication, please advise the recovery room nurses.  If you are  already at home, try drinking only clear liquids and/or call our office.      All pain medications, along with inactivity can cause constipation.  Use the Senakot as directed, increase fluid intake to 1 quart per day and increase your dietary fiber.  (The  P  fruits - peaches, plums, pears, and prunes as well as anise/black licorice are recommended.)    It is not unusual to run a low-grade fever after surgery.  If your temperature is elevated above 102 , lasts longer than 24 hours, or is questionable in any way, contact our office.      Drainage from your cast/dressing is normal.  Reinforce your cast/dressing with 4x4 dressings and cover with an Ace wrap.  Wait until 24 hours after surgery to change your dressings; by this time most of your bleeding will have stopped.  If you have drainage through your dressings 2 days after surgery, contact our office.      You cast/dressing will be changed at your 2-week follow up appointment.  They should be kept clean and DRY.  If your cast/dressing is damaged before that, contact our office.      It is normal to experience some bruising in the toes after surgery and they may appear  dark  when your foot hangs down.  It is important to actively wiggle your toes for at least 5-10 minutes each hour UNLESS you had surgery on those toes.      Use ice on your foot/ankle over the dressing/cast for 20 minutes per hour, 10 or more times per day.  A large bag of frozen peas works well.      Bathing: take a tub or sponge bath instead of a shower if possible during the first two weeks.  If you choose to shower, cover the dressing/cast with a waterproof covering, these may be ordered from www.seal-tight.com or are available at some pharmacies/medical supply stores.  Another option is XeroSox, which is the original vacuum sealed bandage and cast cover.  The cast cover has a vacuum seal and is absolutely waterproof.  1-318.584.2503 or www.xerosox.com for more information.      Driving:  For  surgery on the left foot/ankle, you may drive as soon as narcotic medications are stopped.  For surgery on the right foot/ankle, you may drive when you are out of a cast, off pain medications, and you feel secure with braking.      In general, listen to your foot/ankle.  If you have a sudden, dramatic increase in pain that does not resolve after an hour or so, something is wrong - call our office.  If you fall or bump your foot/ankle and have sudden pain that resolves, give it 24 hours before you call.      Many of your questions can be addressed at your 2-week follow-up appointment - please make a list of things to ask us as they come up during your recovery.      If you had a nerve block it is common to have numbness in your leg and foot for up to 30 hours after surgery.  If your leg or foot is still numb more than 30 hours after surgery, please call the office.    FUTURE DENTIST VISITS:  If you have had a total ankle arthroplasty please be advised you must be on antibiotics prior to ANY dental work.  Please call your dentist office ahead of time and make them aware of this as your dentist will be able to order the prescription.  If you have had any other surgery this is not a concern.      Crutch Instructions      Because of your surgery you will need crutches.  Make sure you tell your healthcare provider if crutches do not seem to work for you.  Using Crutches   Crutches are the most commonly used device for injuries to the lower part of the body because they allow the most mobility. All walking assistance devices require strength in your upper body but crutches require more coordination. If you are unable to use crutches then a cane or walker may be better.   Remember these rules when using crutches:   Always look straight ahead when you walk. Do not look down.   Hold the top padded part of the crutches into your chest near your armpits. Grasp the padded hand  and always support your weight with your arms  "and hands.   Do not lean on the crutches with your armpit as this could cause nerve damage.  Standing Up  Make sure you are in a stable chair. Move forward to the edge of the chair so that your  good  foot is flat on the floor. Put both crutches together and hold the handgrips in one hand. Stretch the injured leg out straight and then use the crutches with one hand and the chair armrest with the other hand to push yourself into a standing position onto your  good  leg. Once you are standing, wait until you are steady before placing a crutch in each hand. Until you become good at standing, have someone assist you in case you lose your balance. When standing still, lean slightly forward with the crutches ahead of you and about 3 feet apart. Unless you are told otherwise, you should keep weight off your injured leg.  Walking  To begin crutch walking, balance yourself on your  good  leg. Move both crutches forward about the length of one step and place them firmly on the floor in front of you about 3 feet apart. Support your weight on the padded hand rests while leaning forward. Press the top of the crutches against your chest wall and not into your armpits. Be sure to hold the injured leg up off of the floor. When ready, swing the \"good\" leg forward about one step length past the crutches while supporting your weight on the padded hand . Be careful not to go too far. Transfer your weight onto the \"good\" leg then bring both crutches forward about the length of one step. Repeating this motion will allow you to move fairly quickly without the use of your injured leg. Keep practicing until you become good at crutch walking.  Sitting Down  Make sure the chair you are about to sit on is stable. Walk in front of the chair such that you are facing away from it. Move back a little at a time until the back of your  good  leg is nearly touching the seat. Keeping your weight on the  good  leg, move both crutches to one hand " "holding onto the handgrips. Lean forward, bend your  good  knee, and move your injured leg out forward. Sit down slowly while using your free hand to reach for the chair s armrest for support. Place the crutches where you can easily get them. Never pivot, even on your  good  leg. This could cause you to fall or injure your  good  leg.  Navigating Stairs, Curbs & Door Steps  Only attempt this once you are very comfortable with regular crutch walking and only when someone is there to steady you should you begin to lose your balance. Hold on to a  railing with one hand and both crutches in the other hand or have someone carry the loose crutch for you. It does not matter which side the handrail is on. If there is no handrail, you can use both crutches. Using only crutches is the same as the railing method but maintaining your balance can be difficult. The crutch-only method is not recommended until you have become very good at crutch walking. Be sure to only take one step at a time. A simple rule to remember for crutches when navigating stairs or curbs: up with the  good  leg and down with the  bad .  Going Up Stairs or Curbs  Walk close to the first step with the crutches slightly behind you. Push down on the handrail and the crutch and step up with the \"good\" leg. You may have to make a short hop to do this if you are not allowed to place any weight on the injured leg. Lean forward and bring the injured leg and the crutch up beside the \"good\" leg. Repeat this until you have climbed up all of the steps. Remember, the \"good\" leg goes up first and the crutches move with the injured leg. The person helping you should stand behind and to your side that is away from the railing.  Going Down Stairs or Curbs  Walk to the edge of the first step. While standing and balancing on the  good  leg, place both crutches in one hand and then down on the step below. Be sure to support your weight by leaning on the crutches " "and handrail. Bring the injured leg forward, then the \"good\" leg down to the same step as the crutches. Remember crutches go down first with the injured leg. The person helping you should  front and to your side that is away from the railing.  Sitting Method for Navigating Stairs  A safer way to get up and down stairs is to sit down. To go up steps, sit down on the second or third step. Push with your  good  leg below while pushing up with both arms on the step above to move your bottom to the next step. When you get to the top of the stairs you may need to use the  scooting  method described later to get back up. To go down steps you first need to lower yourself to the floor near the top of the steps. Once seated, support yourself with your  good  leg on the second to next step below, and push with both arms on the step where you are sitting to move your bottom down to the next step. When you reach the bottom, pull yourself up to standing position using your crutches. It is helpful if someone can move your crutches and assist you in getting up and down. With practice it may be possible to manage on your own.  If You Start To Fall  If you start to fall and cannot get your balance, throw the crutches away from you. Try to fall onto your  good  side away from your injury and then break your fall with your arms. If uninjured, you can usually get back up by moving into a sitting position and scooting to a chair. Push with your arms and hands on the chair seat while pushing with the \"good\" leg to get up into the chair. You should not attempt to get back up if you are having significant pain. Call for assistance or dial 911 for an ambulance if necessary.  Safety Tips for Crutch Walking   At first you may want to wear a training belt (or a strong regular belt) so others can assist you.   Do not use crutches if you feel dizzy or drowsy.   Be careful on slick or wet surfaces like a kitchen or bathroom floor, snow, " ice, or rainy conditions.   Temporarily remove pets, throw rugs or other items from your home that might trip you.   Do not hop around while holding onto furniture.   Wear sneakers or rubber soled shoes that will not easily slip or come off.   Be careful on ramps or slopes as they can be harder to walk up or down   Do not remove any parts from your crutches especially the padding or rubber traction footings. Replace padding or footings that become damaged immediately.   It is important to use your crutches correctly. If you feel any numbness or tingling in your arms, you are probably using the crutches incorrectly.  Helpful Hints   A bedside toilet or toilet riser may be helpful.   Always allow for extra time to get around. Children in school should be given permission to leave class early to avoid crowds when changing classes.   Elevate the injured leg when sitting.   Use a backpack to carry books or waist pouch to carry other items so that both hands are free.   Call your healthcare provider if you have any questions or concerns.            If your cast is still oozing blood tomorrow evening then contact Dr Linn's office to have your dressing change done Wednesday    f you have any further questions or concerns, please call our office at [phone]

## 2017-11-20 NOTE — ANESTHESIA PREPROCEDURE EVALUATION
Anesthesia Evaluation     . Pt has had prior anesthetic. Type: General           ROS/MED HX    ENT/Pulmonary:  - neg pulmonary ROS     Neurologic:     (+)CVA     Cardiovascular:     (+) Dyslipidemia, ----. : . . . :. .       METS/Exercise Tolerance:     Hematologic:         Musculoskeletal:   (+) arthritis, , , -       GI/Hepatic:  - neg GI/hepatic ROS       Renal/Genitourinary:         Endo:         Psychiatric:         Infectious Disease:         Malignancy:         Other:                     Physical Exam  Normal systems: cardiovascular, pulmonary and dental    Airway   Mallampati: I  TM distance: >3 FB  Neck ROM: full    Dental     Cardiovascular   Rhythm and rate: regular and normal      Pulmonary    breath sounds clear to auscultation                    Anesthesia Plan      History & Physical Review  History and physical reviewed and following examination; no interval change.    ASA Status:  2 .    NPO Status:  > 8 hours    Plan for General, LMA and Periph. Nerve Block for postop pain with Propofol induction. Maintenance will be TIVA.    PONV prophylaxis:  Ondansetron (or other 5HT-3) and Dexamethasone or Solumedrol       Postoperative Care  Postoperative pain management:  IV analgesics and Oral pain medications.      Consents  Anesthetic plan, risks, benefits and alternatives discussed with:  Patient..                          .

## 2017-11-27 NOTE — OP NOTE
DATE OF PROCEDURE:  2017.      ADDENDUM:  After completing Blake Mues's ankle surgery, it was obvious that there was still a hallux rigidus deformity.  A longitudinal incision was made over the dorsum of the left great toe MTP joint.  A saw was used to remove the dorsal 20% of the metatarsal head, including all the osteophytes.      This was followed by a 30-degree dorsally-based closing wedge osteotomy of the proximal phalanx of the left toe to further increase dorsiflexion.  Prior to surgery, there was no dorsiflexion, while after the procedure it is about 75 degrees.      The wound was closed in layers.         MANNY BARLOW MD             D: 2017 14:14   T: 2017 16:02   MT: TS      Name:     BLAKE MUSE   MRN:      [id number]        Account:        [id number]   :      1960           Procedure Date: 2017      Document: [id number]

## 2018-01-08 ENCOUNTER — THERAPY VISIT (OUTPATIENT)
Dept: PHYSICAL THERAPY | Facility: CLINIC | Age: 58
End: 2018-01-08
Payer: COMMERCIAL

## 2018-01-08 DIAGNOSIS — M25.572 PAIN IN JOINT, ANKLE AND FOOT, LEFT: Primary | ICD-10-CM

## 2018-01-08 PROCEDURE — 97161 PT EVAL LOW COMPLEX 20 MIN: CPT | Mod: GP | Performed by: PHYSICAL THERAPIST

## 2018-01-08 PROCEDURE — 97110 THERAPEUTIC EXERCISES: CPT | Mod: GP | Performed by: PHYSICAL THERAPIST

## 2018-01-08 PROCEDURE — 97112 NEUROMUSCULAR REEDUCATION: CPT | Mod: GP | Performed by: PHYSICAL THERAPIST

## 2018-01-08 NOTE — MR AVS SNAPSHOT
After Visit Summary   1/8/2018    Case Epstein    MRN: [id number]           Patient Information     Date Of Birth          1960        Visit Information        Provider Department      1/8/2018 10:40 AM Diana Wright, PT [facility] [place] [facility]        Today's Diagnoses     Pain in joint, ankle and foot, left    -  1       Follow-ups after your visit        Your next 10 appointments already scheduled     Jan 13, 2018  8:00 AM CST   GAYATRI Extremity with Ney Posey, PT   [facility] [place] [facility] ([facility] [place])    [address]  [address]  [place] [address]   [phone]            Jan 20, 2018  8:00 AM CST   GAYATRI Extremity with Ney Posey, PT   [facility] [place] [facility] ([facility] [place])    [address]  [address]  [place] [address]   [phone]            Jan 27, 2018  8:40 AM CST   GAYATRI Extremity with Mark Soares PT   [facility] [place] [facility] ([facility] [place])    [address]  [address]  [place] [address]   [phone]              Who to contact     If you have questions or need follow up information about today's clinic visit or your schedule please contact [facility] directly at [phone].  Normal or non-critical lab and imaging results will be communicated to you by "[employer]hart, letter or phone within 4 business days after the clinic has received the results. If you do not hear from us within 7 days, please contact the clinic through "[employer]hart or phone. If you have a critical or abnormal lab result, we will notify you by phone as soon as possible.  Submit refill requests through [employer] or call your pharmacy and they will forward the refill request to us. Please allow 3 business days for your refill to be completed.          Additional Information About Your Visit        "[email or web address] Information     [employer] gives you secure access to your  electronic health record. If you see a primary care provider, you can also send messages to your care team and make appointments. If you have questions, please call your primary care clinic.  If you do not have a primary care provider, please call [phone] and they will assist you.        Care EveryWhere ID     This is your Care EveryWhere ID. This could be used by other organizations to access your [facility] medical records  [id number]         Blood Pressure from Last 3 Encounters:   11/20/17 136/90   11/10/17 130/82   08/16/17 131/88    Weight from Last 3 Encounters:   11/20/17 78 kg (172 lb)   11/10/17 78 kg (172 lb)   08/16/17 77.6 kg (171 lb)              We Performed the Following     HC PT EVAL, LOW COMPLEXITY     GAYATRI INITIAL EVAL REPORT     NEUROMUSCULAR RE-EDUCATION     THERAPEUTIC EXERCISES        Primary Care Provider Office Phone # Fax #    Tushar Granados [phone]       [address] [place] [address]        Equal Access to Services     AMBROCIO MATT AH: Hadii aad ku hadasho Soomaali, waaxda luqadaha, qaybta kaalmada adeegyada, waxay idiin hayaan leyla kharaterri collins . So [facility] [phone].    ATENCIÓN: Si habla español, tiene a cifuentes disposición servicios gratuitos de asistencia lingüística. Llame al [phone].    We comply with applicable federal civil rights laws and [place] laws. We do not discriminate on the basis of race, color, national origin, age, disability, sex, sexual orientation, or gender identity.            Thank you!     Thank you for choosing [facility]  for your care. Our goal is always to provide you with excellent care. Hearing back from our patients is one way we can continue to improve our services. Please take a few minutes to complete the written survey that you may receive in the mail after your visit with us. Thank you!             Your Updated Medication List - Protect others around you: Learn how to  safely use, store and throw away your medicines at www.disposemymeds.org.          This list is accurate as of: 1/8/18 11:37 AM.  Always use your most recent med list.                   Brand Name Dispense Instructions for use Diagnosis    aspirin 81 MG tablet      Take 1 tablet by mouth daily.        BOOST PO           hydrOXYzine 25 MG tablet    ATARAX    30 tablet    Take 1 tablet (25 mg) by mouth every 6 hours as needed for itching (and nausea)    S/P ankle fusion       KRILL OIL PO      Take 300 mg by mouth        multivitamin, therapeutic with minerals Tabs tablet      Take 1 tablet by mouth daily        order for DME     1 Device    Equipment being ordered: crutches, adult    Left ankle pain, unspecified chronicity, Secondary osteoarthritis of left ankle       OVER-THE-COUNTER      Take 1 Dose by mouth daily Joint Juice        oxyCODONE-acetaminophen 5-325 MG per tablet    PERCOCET    40 tablet    Take 1-2 tablets by mouth every 4 hours as needed for pain (moderate to severe)    S/P ankle fusion       senna-docusate 8.6-50 MG per tablet    SENOKOT-S;PERICOLACE    30 tablet    Take 1-2 tablets by mouth 2 times daily Take while on oral narcotics to prevent or treat constipation.    S/P ankle fusion       TURMERIC PO      Take 1,050 mg by mouth        VIACTIV PO      Take 1 chew tab by mouth daily        VITAMIN B 12 PO      Take 2,500 mg by mouth        VITAMIN E PO      Take 1,000 Units by mouth daily

## 2018-01-08 NOTE — PROGRESS NOTES
"[facility] Initial Evaluation  Subjective:  HPI   Case Epstein is a 57 year old male referred to PT for evaluation and treatment s/p L ankle fusion on 11/20/17. Patient states he has been pain free over the past 3 weeks, with only minor soreness if he walks in the boot for long periods of time (>1 hour). Patient is wearing the CAM boot while out of the house for ambulation, but at home is ambulating with no boot. Patient denies any red flags including painful cough/sneeze, saddle anaesthesia, severe night pain, peripheral motor deficit, recent bowel/bladder change, recent vision change, ringing in the ears or pain with swallowing. Patient states that he has had issues with the left ankle and foot for 50 years. 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 Since surgery, patient reports that pain and function of the L foot/ankle has been getting better. Aggravating activities include walking for >1 hour, with pain at worse getting to a 1/10. Relieving activities include rest, with pain at best a 0/10. Current pain rating is 0/10. Next MD follow up is 2/27/18.    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  Previous Functional Status: chronic ankle pain and stiffness          Objective:    ANKLE:     PROM L PROM R AROM L AROM R MMT L MMT R   Dorsiflexion   Lacking 5 6 5/5 5/5   Plantarflexion   10 30 4/5 5/5   Inversion 0 18   1/5 5/5   Eversion 0 3   1/5 5/5   G Toe Ext 37 68   0/5 5/5   G Toe Flex 12 45   1/5 5/5         General Circumfrential Edema Measurements:  Left MTP joint line: 22 cm  Left " transverse tarsal joint line: 30 cm  Just proximal to lateral and medial malleoli: 23 cm      Sensation Testing:  Light touch intact to left L4, L5, and S1 dermatomes    Gait: Patient ambulating in CAM boot in community; out of boot he demonstrates decreased heel strike and push off phases    Integument: small scab over lateral malleolus; all incisions well healed, increased redness of left foot compared to right with a gradual fade to normal skin color up the left calf    System    Physical Exam    General     ROS    Assessment/Plan:    Patient is a 57 year old male with left side ankle complaints.    Patient has the following significant findings with corresponding treatment plan.                Diagnosis 1:  S/p left ankle fusion  Decreased ROM/flexibility - manual therapy, therapeutic exercise, therapeutic activity and home program  Decreased strength - therapeutic exercise, therapeutic activities, home program and neuromuscular re-education  Impaired balance - neuro re-education, gait training, therapeutic activities and home program    Therapy Evaluation Codes:   1) History comprised of:   Personal factors that impact the plan of care:      Time since onset of symptoms.    Comorbidity factors that impact the plan of care are:      None.     Medications impacting care: None.  2) Examination of Body Systems comprised of:   Body structures and functions that impact the plan of care:      Ankle and foot.   Activity limitations that impact the plan of care are:      Walking.  3) Clinical presentation characteristics are:   Stable/Uncomplicated.  4) Decision-Making    Low complexity using standardized patient assessment instrument and/or measureable assessment of functional outcome.  Cumulative Therapy Evaluation is: Low complexity.    Previous and current functional limitations:  (See Goal Flow Sheet for this information)    Short term and Long term goals: (See Goal Flow Sheet for this information)      Communication ability:  Patient appears to be able to clearly communicate and understand verbal and written communication and follow directions correctly.  Treatment Explanation - The following has been discussed with the patient:   RX ordered/plan of care  Anticipated outcomes  Possible risks and side effects  This patient would benefit from PT intervention to resume normal activities.   Rehab potential is fair.    Frequency:  1 X week, once daily  Duration:  for 10 weeks; 10 total visits per physician referral  Discharge Plan:  Achieve all LTG.  Independent in home treatment program.  Reach maximal therapeutic benefit.    Please refer to the daily flowsheet for treatment today, total treatment time and time spent performing 1:1 timed codes.

## 2018-01-08 NOTE — LETTER
"[facility]  [address]  [address]  [place] [address]  [phone]    2018    Re: Case Epstein   :   1960  MRN:  [id number]   REFERRING PHYSICIAN:   Shaggy Linn    [facility]    Date of Initial Evaluation:  2018  Visits:  Rxs Used: 1  Reason for Referral:  Pain in joint, ankle and foot, left    [facility] Initial Evaluation    Subjective:  Case Epstein is a 57 year old male referred to PT for evaluation and treatment s/p L ankle fusion on 17. Patient states he has been pain free over the past 3 weeks, with only minor soreness if he walks in the boot for long periods of time (>1 hour). Patient is wearing the CAM boot while out of the house for ambulation, but at home is ambulating with no boot. Patient denies any red flags including painful cough/sneeze, saddle anaesthesia, severe night pain, peripheral motor deficit, recent bowel/bladder change, recent vision change, ringing in the ears or pain with swallowing. Patient states that he has had issues with the left ankle and foot for 50 years. 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 Since surgery, patient reports that pain and function of the L foot/ankle has been getting better. Aggravating activities include walking for >1 hour, with pain at worse getting to a 1/10. Relieving activities include rest, with pain at best a 0/10. Current pain rating is 0/10. Next MD follow up is 18.  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  Previous Functional Status: chronic ankle pain and stiffness          Objective:  ANKLE:     PROM L PROM R AROM L AROM R MMT L MMT R   Dorsiflexion   Lacking 5 6 5/5 5/5   Plantarflexion   10 30 4/5 5/5   Inversion 0 18   1/5 5/5   Eversion 0 3   1/5 5/5   G Toe Ext 37 68   0/5 5/5   G Toe Flex 12 45   1/5 5/5           Re: Case Epstein   :   1960  Pg 2    General Circumfrential Edema Measurements:  Left MTP joint line: 22 cm  Left transverse tarsal joint line: 30 cm  Just proximal to lateral and medial malleoli: 23 cm  Sensation Testing:  Light touch intact to left L4, L5, and S1 dermatomes  Gait: Patient ambulating in CAM boot in community; out of boot he demonstrates decreased heel strike and push off phases  Integument: small scab over lateral malleolus; all incisions well healed, increased redness of left foot compared to right with a gradual fade to normal skin color up the left calf    Assessment/Plan:    Patient is a 57 year old male with left side ankle complaints.    Patient has the following significant findings with corresponding treatment plan.                Diagnosis 1:  S/p left ankle fusion  Decreased ROM/flexibility - manual therapy, therapeutic exercise, therapeutic activity and home program  Decreased strength - therapeutic exercise, therapeutic activities, home program and neuromuscular re-education  Impaired balance - neuro re-education, gait training, therapeutic activities and home program  Previous and current functional limitations:  (See Goal Flow Sheet for this information)    Short term and Long term goals: (See Goal Flow Sheet for this information)   Communication ability:  Patient appears to be able to clearly communicate and understand verbal and written communication and follow directions correctly.  Treatment Explanation - The following has been discussed with the patient:   RX ordered/plan of care  Anticipated outcomes  Possible risks and side effects  This  patient would benefit from PT intervention to resume normal activities.   Rehab potential is fair.  Frequency:  1 X week, once daily  Duration:  for 10 weeks; 10 total visits per physician referral  Discharge Plan:  Achieve all LTG.  Independent in home treatment program.  Reach maximal therapeutic benefit.  Please refer to the daily flowsheet for treatment today, total treatment time and time spent performing 1:1 timed codes.     Thank you for your referral.    INQUIRIES  Therapist: Diana Wright DPT  [facility]  [address]  [address]  [place] [address]  Phone: [phone]  Fax: [phone]

## 2018-01-13 ENCOUNTER — THERAPY VISIT (OUTPATIENT)
Dept: PHYSICAL THERAPY | Facility: CLINIC | Age: 58
End: 2018-01-13
Payer: COMMERCIAL

## 2018-01-13 DIAGNOSIS — M25.572 PAIN IN JOINT, ANKLE AND FOOT, LEFT: ICD-10-CM

## 2018-01-13 PROCEDURE — 97110 THERAPEUTIC EXERCISES: CPT | Mod: GP | Performed by: PHYSICAL THERAPIST

## 2018-01-13 PROCEDURE — 97112 NEUROMUSCULAR REEDUCATION: CPT | Mod: GP | Performed by: PHYSICAL THERAPIST

## 2018-01-20 ENCOUNTER — THERAPY VISIT (OUTPATIENT)
Dept: PHYSICAL THERAPY | Facility: CLINIC | Age: 58
End: 2018-01-20
Payer: COMMERCIAL

## 2018-01-20 DIAGNOSIS — M25.572 PAIN IN JOINT, ANKLE AND FOOT, LEFT: ICD-10-CM

## 2018-01-20 PROCEDURE — 97112 NEUROMUSCULAR REEDUCATION: CPT | Mod: GP | Performed by: PHYSICAL THERAPIST

## 2018-01-20 PROCEDURE — 97140 MANUAL THERAPY 1/> REGIONS: CPT | Mod: GP | Performed by: PHYSICAL THERAPIST

## 2018-01-20 PROCEDURE — 97110 THERAPEUTIC EXERCISES: CPT | Mod: GP | Performed by: PHYSICAL THERAPIST

## 2018-01-20 NOTE — PROGRESS NOTES
Subjective:  HPI                    Objective:  System    Physical Exam    General     ROS    Assessment/Plan:    SUBJECTIVE  Subjective changes as noted by pt: Doing well. Ankle is feeling stronger. There was one instance in the last week in which he feels his boot was painful; when he took it off and wore his tennis shoe instead, he felt better.   Current pain level: 0/10   Changes in function:  None     Adverse reaction to treatment or activity:  None    OBJECTIVE  Changes in objective findings: No change. Continues to show little to no movement in any active foot/ankle motion. No active great toe extension, minimal flexion.     ASSESSMENT  Case continues to require intervention to meet STG and LTG's: PT  Patient is progressing as expected.  Response to therapy has shown an improvement in  strength  Progress made towards STG/LTG?  None    PLAN  Current treatment program is being advanced to more complex exercises.    PTA/ATC plan:  N/A    Please refer to the daily flowsheet for treatment today, total treatment time and time spent performing 1:1 timed codes.

## 2018-01-20 NOTE — MR AVS SNAPSHOT
After Visit Summary   1/20/2018    Case Epstein    MRN: [id number]           Patient Information     Date Of Birth          1960        Visit Information        Provider Department      1/20/2018 8:00 AM Ney Posey, PT [facility] [place] [facility]        Today's Diagnoses     Pain in joint, ankle and foot, left           Follow-ups after your visit        Your next 10 appointments already scheduled     Jan 27, 2018  8:40 AM CST   GAYATRI Extremity with Mark Soares PT   [facility] [place] [facility] ([facility] [place])    [address]  [address]  [place] [address]   [phone]            Feb 03, 2018  8:00 AM CST   GAYATRI Extremity with Annamaria Judge, PT   [facility] [place] [facility] ([facility] [place])    [address]  [address]  [place] [address]   [phone]            Feb 10, 2018  8:00 AM CST   GAYATRI Extremity with Ney Posey, PT   [facility] [place] [facility] ([facility] [place])    [address]  [address]  [place] [address]   [phone]            Feb 17, 2018  8:00 AM CST   GAYATRI Extremity with Ney Posey, PT   [facility] [place] [facility] ([facility] [place])    [address]  [address]  [place] [address]   [phone]            Feb 24, 2018  8:00 AM CST   GAYATRI Extremity with Ney Posey, PT   [facility] [place] [facility] ([facility] [place])    [address]  [place] [address]   [phone]              Who to contact     If you have questions or need follow up information about today's clinic visit or your schedule please contact [facility] directly at [phone].  Normal or non-critical lab and imaging results will be communicated to you by MyChart, letter or phone within 4 business days after the clinic has received the results. If you do not hear from us within 7 days, please  contact the clinic through [employer] or phone. If you have a critical or abnormal lab result, we will notify you by phone as soon as possible.  Submit refill requests through [employer] or call your pharmacy and they will forward the refill request to us. Please allow 3 business days for your refill to be completed.          Additional Information About Your Visit        [employer] Information     [employer] gives you secure access to your electronic health record. If you see a primary care provider, you can also send messages to your care team and make appointments. If you have questions, please call your primary care clinic.  If you do not have a primary care provider, please call [phone] and they will assist you.        Care EveryWhere ID     This is your Care EveryWhere ID. This could be used by other organizations to access your [facility] medical records  [id number]         Blood Pressure from Last 3 Encounters:   11/20/17 136/90   11/10/17 130/82   08/16/17 131/88    Weight from Last 3 Encounters:   11/20/17 78 kg (172 lb)   11/10/17 78 kg (172 lb)   08/16/17 77.6 kg (171 lb)              We Performed the Following     MANUAL THER TECH,1+REGIONS,EA 15 MIN     NEUROMUSCULAR RE-EDUCATION     THERAPEUTIC EXERCISES        Primary Care Provider Office Phone # Fax #    Tushar Granados [phone]       [address] [address]        Equal Access to Services     JOVAN [facility] Res[facility] CenterLOVE : Hadii aad ku hadasho Soomaali, waaxda luqadaha, qaybta kaalmada hernandez, carmencita easton. So [facility] [phone].    ATENCIÓN: Si habla español, tiene a cifuentes disposición servicios gratuitos de asistencia lingüística. Javed al [phone].    We comply with applicable federal civil rights laws and [place] laws. We do not discriminate on the basis of race, color, national origin, age, disability, sex, sexual orientation, or gender identity.            Thank you!     Thank you for  choosing [facility]  for your care. Our goal is always to provide you with excellent care. Hearing back from our patients is one way we can continue to improve our services. Please take a few minutes to complete the written survey that you may receive in the mail after your visit with us. Thank you!             Your Updated Medication List - Protect others around you: Learn how to safely use, store and throw away your medicines at www.disposemymeds.org.          This list is accurate as of: 1/20/18  9:49 AM.  Always use your most recent med list.                   Brand Name Dispense Instructions for use Diagnosis    aspirin 81 MG tablet      Take 1 tablet by mouth daily.        BOOST PO           hydrOXYzine 25 MG tablet    ATARAX    30 tablet    Take 1 tablet (25 mg) by mouth every 6 hours as needed for itching (and nausea)    S/P ankle fusion       KRILL OIL PO      Take 300 mg by mouth        multivitamin, therapeutic with minerals Tabs tablet      Take 1 tablet by mouth daily        order for DME     1 Device    Equipment being ordered: crutches, adult    Left ankle pain, unspecified chronicity, Secondary osteoarthritis of left ankle       OVER-THE-COUNTER      Take 1 Dose by mouth daily Joint Juice        oxyCODONE-acetaminophen 5-325 MG per tablet    PERCOCET    40 tablet    Take 1-2 tablets by mouth every 4 hours as needed for pain (moderate to severe)    S/P ankle fusion       senna-docusate 8.6-50 MG per tablet    SENOKOT-S;PERICOLACE    30 tablet    Take 1-2 tablets by mouth 2 times daily Take while on oral narcotics to prevent or treat constipation.    S/P ankle fusion       TURMERIC PO      Take 1,050 mg by mouth        VIACTIV PO      Take 1 chew tab by mouth daily        VITAMIN B 12 PO      Take 2,500 mg by mouth        VITAMIN E PO      Take 1,000 Units by mouth daily

## 2018-01-27 ENCOUNTER — THERAPY VISIT (OUTPATIENT)
Dept: PHYSICAL THERAPY | Facility: CLINIC | Age: 58
End: 2018-01-27
Payer: COMMERCIAL

## 2018-01-27 DIAGNOSIS — M25.572 PAIN IN JOINT, ANKLE AND FOOT, LEFT: ICD-10-CM

## 2018-01-27 PROCEDURE — 97112 NEUROMUSCULAR REEDUCATION: CPT | Mod: GP | Performed by: PHYSICAL THERAPIST

## 2018-01-27 PROCEDURE — 97110 THERAPEUTIC EXERCISES: CPT | Mod: GP | Performed by: PHYSICAL THERAPIST

## 2018-02-03 ENCOUNTER — THERAPY VISIT (OUTPATIENT)
Dept: PHYSICAL THERAPY | Facility: CLINIC | Age: 58
End: 2018-02-03
Payer: COMMERCIAL

## 2018-02-03 DIAGNOSIS — M25.572 PAIN IN JOINT, ANKLE AND FOOT, LEFT: ICD-10-CM

## 2018-02-03 PROCEDURE — 97110 THERAPEUTIC EXERCISES: CPT | Mod: GP | Performed by: PHYSICAL THERAPIST

## 2018-02-03 PROCEDURE — 97112 NEUROMUSCULAR REEDUCATION: CPT | Mod: GP | Performed by: PHYSICAL THERAPIST

## 2018-02-10 ENCOUNTER — THERAPY VISIT (OUTPATIENT)
Dept: PHYSICAL THERAPY | Facility: CLINIC | Age: 58
End: 2018-02-10
Payer: COMMERCIAL

## 2018-02-10 DIAGNOSIS — M25.572 PAIN IN JOINT, ANKLE AND FOOT, LEFT: ICD-10-CM

## 2018-02-10 PROCEDURE — 97112 NEUROMUSCULAR REEDUCATION: CPT | Mod: GP | Performed by: PHYSICAL THERAPIST

## 2018-02-10 PROCEDURE — 97110 THERAPEUTIC EXERCISES: CPT | Mod: GP | Performed by: PHYSICAL THERAPIST

## 2018-02-10 NOTE — MR AVS SNAPSHOT
After Visit Summary   2/10/2018    Case Epstein    MRN: [id number]           Patient Information     Date Of Birth          1960        Visit Information        Provider Department      2/10/2018 8:00 AM Ney Posey, PT [facility] [place] [facility]        Today's Diagnoses     Pain in joint, ankle and foot, left           Follow-ups after your visit        Your next 10 appointments already scheduled     Feb 17, 2018  8:00 AM CST   GAYATRI Extremity with Ney Posey PT   [facility] [place] [facility] ([facility]RI [facility] [place])    [address]  [place] [address]   [phone]            Feb 24, 2018  8:00 AM CST   GAYATRI Extremity with Ney Posey PT   [facility] [place] [facility] ([facility] [place])    [address]  [place] [address]   [phone]              Who to contact     If you have questions or need follow up information about today's clinic visit or your schedule please contact [facility] [place] [facility] directly at [phone].  Normal or non-critical lab and imaging results will be communicated to you by Fizhart, letter or phone within 4 business days after the clinic has received the results. If you do not hear from us within 7 days, please contact the clinic through Surplext or phone. If you have a critical or abnormal lab result, we will notify you by phone as soon as possible.  Submit refill requests through mediaBunker or call your pharmacy and they will forward the refill request to us. Please allow 3 business days for your refill to be completed.          Additional Information About Your Visit        MyChart Information     mediaBunker gives you secure access to your electronic health record. If you see a primary care provider, you can also send messages to your care team and make appointments. If you have questions, please call your primary care clinic.  If you do not have a  primary care provider, please call [phone] and they will assist you.        Care EveryWhere ID     This is your Care EveryWhere ID. This could be used by other organizations to access your [place] medical records  [id number]         Blood Pressure from Last 3 Encounters:   11/20/17 136/90   11/10/17 130/82   08/16/17 131/88    Weight from Last 3 Encounters:   11/20/17 78 kg (172 lb)   11/10/17 78 kg (172 lb)   08/16/17 77.6 kg (171 lb)              We Performed the Following     NEUROMUSCULAR RE-EDUCATION     THERAPEUTIC EXERCISES        Primary Care Provider Office Phone # Fax #    Tushar Granados [phone]       [address] [facility] [address]        Equal Access to Services     AMBROCIO MATT : Hadii santy espinoza hadasho Soomaali, waaxda luqadaha, qaybta kaalmada adeegyada, waxcullen elizabeth haysadin leyla collins . So [facility] [phone].    ATENCIÓN: Si habla español, tiene a cifuentes disposición servicios gratuitos de asistencia lingüística. Llame al [phone].    We comply with applicable federal civil rights laws and [place] laws. We do not discriminate on the basis of race, color, national origin, age, disability, sex, sexual orientation, or gender identity.            Thank you!     Thank you for choosing [facility]  for your care. Our goal is always to provide you with excellent care. Hearing back from our patients is one way we can continue to improve our services. Please take a few minutes to complete the written survey that you may receive in the mail after your visit with us. Thank you!             Your Updated Medication List - Protect others around you: Learn how to safely use, store and throw away your medicines at www.disposemymeds.org.          This list is accurate as of 2/10/18  8:40 AM.  Always use your most recent med list.                   Brand Name Dispense Instructions for use Diagnosis    aspirin 81 MG tablet      Take 1  tablet by mouth daily.        BOOST PO           hydrOXYzine 25 MG tablet    ATARAX    30 tablet    Take 1 tablet (25 mg) by mouth every 6 hours as needed for itching (and nausea)    S/P ankle fusion       KRILL OIL PO      Take 300 mg by mouth        multivitamin, therapeutic with minerals Tabs tablet      Take 1 tablet by mouth daily        order for DME     1 Device    Equipment being ordered: crutches, adult    Left ankle pain, unspecified chronicity, Secondary osteoarthritis of left ankle       OVER-THE-COUNTER      Take 1 Dose by mouth daily Joint Juice        oxyCODONE-acetaminophen 5-325 MG per tablet    PERCOCET    40 tablet    Take 1-2 tablets by mouth every 4 hours as needed for pain (moderate to severe)    S/P ankle fusion       senna-docusate 8.6-50 MG per tablet    SENOKOT-S;PERICOLACE    30 tablet    Take 1-2 tablets by mouth 2 times daily Take while on oral narcotics to prevent or treat constipation.    S/P ankle fusion       TURMERIC PO      Take 1,050 mg by mouth        VIACTIV PO      Take 1 chew tab by mouth daily        VITAMIN B 12 PO      Take 2,500 mg by mouth        VITAMIN E PO      Take 1,000 Units by mouth daily

## 2018-02-10 NOTE — PROGRESS NOTES
Subjective:  HPI                    Objective:  System    Physical Exam    General     ROS    Assessment/Plan:    SUBJECTIVE  Subjective changes as noted by pt: Patient has been working on walking 2 times per day for 15 minutes and that's going well. He notes that he has to shorten his stride. If he doesn't, he will have pain.   Current pain level: 0/10   Changes in function:  Yes (See Goal flowsheet attached for changes in current functional level)     Adverse reaction to treatment or activity:  None    OBJECTIVE  Changes in objective findings: No change in objective ROM measures. Gait quality improved - shortened step length leads to decreased limp.     ASSESSMENT  Case continues to require intervention to meet STG and LTG's: PT  Patient is progressing as expected.  Response to therapy has shown an improvement in  gait and function  Progress made towards STG/LTG? Yes (See Goal flowsheet attached for updates on achievement of STG and LTG)    PLAN  Current treatment program is being advanced to more complex exercises.    PTA/ATC plan:  N/A    Please refer to the daily flowsheet for treatment today, total treatment time and time spent performing 1:1 timed codes.

## 2018-02-17 ENCOUNTER — THERAPY VISIT (OUTPATIENT)
Dept: PHYSICAL THERAPY | Facility: CLINIC | Age: 58
End: 2018-02-17
Payer: COMMERCIAL

## 2018-02-17 DIAGNOSIS — M25.572 PAIN IN JOINT, ANKLE AND FOOT, LEFT: ICD-10-CM

## 2018-02-17 PROCEDURE — 97110 THERAPEUTIC EXERCISES: CPT | Mod: GP | Performed by: PHYSICAL THERAPIST

## 2018-02-17 PROCEDURE — 97140 MANUAL THERAPY 1/> REGIONS: CPT | Mod: GP | Performed by: PHYSICAL THERAPIST

## 2018-02-17 PROCEDURE — 97112 NEUROMUSCULAR REEDUCATION: CPT | Mod: GP | Performed by: PHYSICAL THERAPIST

## 2018-02-17 NOTE — PROGRESS NOTES
Subjective:  HPI                    Objective:  System      Physical Exam    General     ROS    Assessment/Plan:    SUBJECTIVE  Subjective changes as noted by pt: Patient reports his walking continues to improve. 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   Current pain level: 0/10   Changes in function:  None     Adverse reaction to treatment or activity:  None    OBJECTIVE  Changes in objective findings: No objective change in ROM. Gait quality in shoe improved. Tolerated addition of supplemental strengthening well.     ASSESSMENT  Case continues to require intervention to meet STG and LTG's: PT  Patient is progressing as expected.  Response to therapy has shown an improvement in  strength and gait  Progress made towards STG/LTG?  None    PLAN  Current treatment program is being advanced to more complex exercises.    PTA/ATC plan:  N/A    Please refer to the daily flowsheet for treatment today, total treatment time and time spent performing 1:1 timed codes.

## 2018-02-17 NOTE — MR AVS SNAPSHOT
After Visit Summary   2/17/2018    Case Epstein    MRN: [id number]           Patient Information     Date Of Birth          1960        Visit Information        Provider Department      2/17/2018 8:00 AM Ney Posey, ELIZABETH [facility] [place] [facility]        Today's Diagnoses     Pain in joint, ankle and foot, left           Follow-ups after your visit        Your next 10 appointments already scheduled     Feb 24, 2018  8:00 AM CST   GAYATRI Extremity with Ney Posey PT   [facility] [place] [facility] ([facility] [place])    [address]  [address]  [place] [address] [phone]              Who to contact     If you have questions or need follow up information about today's clinic visit or your schedule please contact [facility] [place] [facility] directly at [phone].  Normal or non-critical lab and imaging results will be communicated to you by MyChart, letter or phone within 4 business days after the clinic has received the results. If you do not hear from us within 7 days, please contact the clinic through [employer] Nevad[employer] or phone. If you have a critical or abnormal lab result, we will notify you by phone as soon as possible.  Submit refill requests through Pirq or call your pharmacy and they will forward the refill request to us. Please allow 3 business days for your refill to be completed.          Additional Information About Your Visit        MyChart Information     Pirq gives you secure access to your electronic health record. If you see a primary care provider, you can also send messages to your care team and make appointments. If you have questions, please call your primary care clinic.  If you do not have a primary care provider, please call [phone] and they will assist you.        Care EveryWhere ID     This is your Care EveryWhere ID. This could be used by other organizations to access your [facility]  records  [id number]         Blood Pressure from Last 3 Encounters:   11/20/17 136/90   11/10/17 130/82   08/16/17 131/88    Weight from Last 3 Encounters:   11/20/17 78 kg (172 lb)   11/10/17 78 kg (172 lb)   08/16/17 77.6 kg (171 lb)              We Performed the Following     MANUAL THER TECH,1+REGIONS,EA 15 MIN     NEUROMUSCULAR RE-EDUCATION     THERAPEUTIC EXERCISES        Primary Care Provider Office Phone # Fax #    Tushar Granados [phone]       [address] [facility] [address]        Equal Access to Services     [facility]: Hadii aad ku hadasho Soomaali, waaxda luqadaha, qaybta kaalmada adeegyada, carmencita elizabeth haysadin leyla collins . So [facility] [phone].    ATENCIÓN: Si habla español, tiene a cifuentes disposición servicios gratuitos de asistencia lingüística. Llame al [phone].    We comply with applicable federal civil rights laws and [place] laws. We do not discriminate on the basis of race, color, national origin, age, disability, sex, sexual orientation, or gender identity.            Thank you!     Thank you for choosing [facility]   for your care. Our goal is always to provide you with excellent care. Hearing back from our patients is one way we can continue to improve our services. Please take a few minutes to complete the written survey that you may receive in the mail after your visit with us. Thank you!             Your Updated Medication List - Protect others around you: Learn how to safely use, store and throw away your medicines at www.disposemymeds.org.          This list is accurate as of 2/17/18  8:36 AM.  Always use your most recent med list.                   Brand Name Dispense Instructions for use Diagnosis    aspirin 81 MG tablet      Take 1 tablet by mouth daily.        BOOST PO           hydrOXYzine 25 MG tablet    ATARAX    30 tablet    Take 1 tablet (25 mg) by mouth every 6 hours as needed for itching (and  nausea)    S/P ankle fusion       KRILL OIL PO      Take 300 mg by mouth        multivitamin, therapeutic with minerals Tabs tablet      Take 1 tablet by mouth daily        order for DME     1 Device    Equipment being ordered: crutches, adult    Left ankle pain, unspecified chronicity, Secondary osteoarthritis of left ankle       OVER-THE-COUNTER      Take 1 Dose by mouth daily Joint Juice        oxyCODONE-acetaminophen 5-325 MG per tablet    PERCOCET    40 tablet    Take 1-2 tablets by mouth every 4 hours as needed for pain (moderate to severe)    S/P ankle fusion       senna-docusate 8.6-50 MG per tablet    SENOKOT-S;PERICOLACE    30 tablet    Take 1-2 tablets by mouth 2 times daily Take while on oral narcotics to prevent or treat constipation.    S/P ankle fusion       TURMERIC PO      Take 1,050 mg by mouth        VIACTIV PO      Take 1 chew tab by mouth daily        VITAMIN B 12 PO      Take 2,500 mg by mouth        VITAMIN E PO      Take 1,000 Units by mouth daily

## 2018-02-24 ENCOUNTER — THERAPY VISIT (OUTPATIENT)
Dept: PHYSICAL THERAPY | Facility: CLINIC | Age: 58
End: 2018-02-24
Payer: COMMERCIAL

## 2018-02-24 DIAGNOSIS — M25.572 PAIN IN JOINT, ANKLE AND FOOT, LEFT: ICD-10-CM

## 2018-02-24 PROCEDURE — 97140 MANUAL THERAPY 1/> REGIONS: CPT | Mod: GP | Performed by: PHYSICAL THERAPIST

## 2018-02-24 PROCEDURE — 97112 NEUROMUSCULAR REEDUCATION: CPT | Mod: GP | Performed by: PHYSICAL THERAPIST

## 2018-02-24 PROCEDURE — 97110 THERAPEUTIC EXERCISES: CPT | Mod: GP | Performed by: PHYSICAL THERAPIST

## 2018-02-24 NOTE — LETTER
[facility] [facility]  [address]  [address]  [place] [address]  [phone]    2018    Re: Case Epstein   :   1960  MRN:  [id number]   REFERRING PHYSICIAN:   Shaggy Linn    [facility] [facility]    Date of Initial Evaluation:  2018  Visits:  Rxs Used: 8  Reason for Referral:  Pain in joint, ankle and foot, left    PROGRESS  REPORT  Progress reporting period is from 18 to 18.       SUBJECTIVE  Subjective changes noted by patient: Patient reports he is pleased with his progress. He is walking better and not having any pain with walking. He only wears his boot when using metro transit. He has been able to walk 15 minutes, twice per day, without pain.  Current pain level is 0/10.     Previous pain level was 0/10.   Changes in function:  Yes (See Goal flowsheet attached for changes in current functional level)  Adverse reaction to treatment or activity: None    OBJECTIVE  Changes noted in objective findings: TCJ ROM status quo. Minimal midfoot motion but improved versus initial visit. Shows altered gait pattern - decreased weight bearing dorsiflexion as expected. Able to create muscle force all directions about the ankle in his fixed ankle position. Continues to lack ROM and active control of his great toe. Hip MMT abduction and extension 4/5.    ASSESSMENT/PLAN  Updated problem list and treatment plan: Diagnosis 1:  S/p left ankle fusion      Decreased ROM/flexibility - home program  Decreased strength - home program  Impaired balance - home program  STG/LTGs have been met or progress has been made towards goals:  Yes (See Goal flow sheet completed today.)  Assessment of Progress: The patient's condition is improving.  The patient's condition has potential to improve.  Self Management Plans:  Patient has been instructed in a home treatment program.  Patient is independent in a home treatment program.  I have  re-evaluated this patient and find that the nature, scope, duration and intensity of the therapy is appropriate for the medical condition of the patient.  Case continues to require the following intervention to meet STG and LTG's:  PT intervention is no longer required to meet STG/LTG.    Recommendations:  This patient is ready to be discharged from therapy and continue their home treatment program.  Case is satisfied with his progress and independent in his home program. He is agreeable to continuing his program independently.   If there is more that the referring physician would like us to work on, we would be happy to continue treating Case with an updated order.    Thank you for your referral.    INQUIRIES  Therapist: Ney Posey, ELIZABETH   [facility] [facility]  [address]  [address]  [place] [address]  Phone: [phone]  Fax: [phone]

## 2018-02-24 NOTE — PROGRESS NOTES
Subjective:  HPI                    Objective:  System    Physical Exam    General     ROS    Assessment/Plan:

## 2018-02-24 NOTE — MR AVS SNAPSHOT
After Visit Summary   2/24/2018    Case Epstein    MRN: [id number]           Patient Information     Date Of Birth          1960        Visit Information        Provider Department      2/24/2018 8:00 AM Ney Posey PT [facility] [place] [facility]        Today's Diagnoses     Pain in joint, ankle and foot, left           Follow-ups after your visit        Who to contact     If you have questions or need follow up information about today's clinic visit or your schedule please contact [facility] [place] [facility] directly at [phone].  Normal or non-critical lab and imaging results will be communicated to you by Camalize SLhart, letter or phone within 4 business days after the clinic has received the results. If you do not hear from us within 7 days, please contact the clinic through [employer] or phone. If you have a critical or abnormal lab result, we will notify you by phone as soon as possible.  Submit refill requests through Stylefie or call your pharmacy and they will forward the refill request to us. Please allow 3 business days for your refill to be completed.          Additional Information About Your Visit        MyChart Information     Stylefie gives you secure access to your electronic health record. If you see a primary care provider, you can also send messages to your care team and make appointments. If you have questions, please call your primary care clinic.  If you do not have a primary care provider, please call [phone] and they will assist you.        Care EveryWhere ID     This is your Care EveryWhere ID. This could be used by other organizations to access your [place] medical records  [id number]         Blood Pressure from Last 3 Encounters:   11/20/17 136/90   11/10/17 130/82   08/16/17 131/88    Weight from Last 3 Encounters:   11/20/17 78 kg (172 lb)   11/10/17 78 kg (172 lb)   08/16/17 77.6 kg (171 lb)              We Performed the  Following     GAYATRI PROGRESS NOTES REPORT     MANUAL THER TECH,1+REGIONS,EA 15 MIN     NEUROMUSCULAR RE-EDUCATION     THERAPEUTIC EXERCISES        Primary Care Provider Office Phone # Fax #    Tushar Granados [phone]       [address] [facility] [address]        Equal Access to Services     [facility] : Hadii aad ku hadasho Soomaali, waaxda luqadaha, qaybta kaalmada adeegyada, waxay liorin hayasdin adecelia obrien laelogaudencio . So [facility] [phone].    ATENCIÓN: Si habla español, tiene a cifuentes disposición servicios gratuitos de asistencia lingüística. Jackieame al [phone].    We comply with applicable federal civil rights laws and [place] laws. We do not discriminate on the basis of race, color, national origin, age, disability, sex, sexual orientation, or gender identity.            Thank you!     Thank you for choosing [facility]  for your care. Our goal is always to provide you with excellent care. Hearing back from our patients is one way we can continue to improve our services. Please take a few minutes to complete the written survey that you may receive in the mail after your visit with us. Thank you!             Your Updated Medication List - Protect others around you: Learn how to safely use, store and throw away your medicines at www.disposemymeds.org.          This list is accurate as of 2/24/18  8:41 AM.  Always use your most recent med list.                   Brand Name Dispense Instructions for use Diagnosis    aspirin 81 MG tablet      Take 1 tablet by mouth daily.        BOOST PO           hydrOXYzine 25 MG tablet    ATARAX    30 tablet    Take 1 tablet (25 mg) by mouth every 6 hours as needed for itching (and nausea)    S/P ankle fusion       KRILL OIL PO      Take 300 mg by mouth        multivitamin, therapeutic with minerals Tabs tablet      Take 1 tablet by mouth daily        order for DME     1 Device    Equipment being ordered: crutches,  adult    Left ankle pain, unspecified chronicity, Secondary osteoarthritis of left ankle       OVER-THE-COUNTER      Take 1 Dose by mouth daily Joint Juice        oxyCODONE-acetaminophen 5-325 MG per tablet    PERCOCET    40 tablet    Take 1-2 tablets by mouth every 4 hours as needed for pain (moderate to severe)    S/P ankle fusion       senna-docusate 8.6-50 MG per tablet    SENOKOT-S;PERICOLACE    30 tablet    Take 1-2 tablets by mouth 2 times daily Take while on oral narcotics to prevent or treat constipation.    S/P ankle fusion       TURMERIC PO      Take 1,050 mg by mouth        VIACTIV PO      Take 1 chew tab by mouth daily        VITAMIN B 12 PO      Take 2,500 mg by mouth        VITAMIN E PO      Take 1,000 Units by mouth daily

## 2018-02-24 NOTE — PROGRESS NOTES
Subjective:  HPI                    Objective:  System    Physical Exam    General     ROS    Assessment/Plan:      PROGRESS  REPORT    Progress reporting period is from 1/8/18 to 2/24/18.       SUBJECTIVE  Subjective changes noted by patient: Patient reports he is pleased with his progress. He is walking better and not having any pain with walking. He only wears his boot when using metro transit. He has been able to walk 15 minutes, twice per day, without pain.  Current pain level is 0/10.     Previous pain level was 0/10.   Changes in function:  Yes (See Goal flowsheet attached for changes in current functional level)  Adverse reaction to treatment or activity: None    OBJECTIVE  Changes noted in objective findings: TCJ ROM status quo. Minimal midfoot motion but improved versus initial visit. Shows altered gait pattern - decreased weight bearing dorsiflexion as expected. Able to create muscle force all directions about the ankle in his fixed ankle position. Continues to lack ROM and active control of his great toe. Hip MMT abduction and extension 4/5.    ASSESSMENT/PLAN  Updated problem list and treatment plan: Diagnosis 1:  S/p left ankle fusion      Decreased ROM/flexibility - home program  Decreased strength - home program  Impaired balance - home program  STG/LTGs have been met or progress has been made towards goals:  Yes (See Goal flow sheet completed today.)  Assessment of Progress: The patient's condition is improving.  The patient's condition has potential to improve.  Self Management Plans:  Patient has been instructed in a home treatment program.  Patient is independent in a home treatment program.  I have re-evaluated this patient and find that the nature, scope, duration and intensity of the therapy is appropriate for the medical condition of the patient.  Case continues to require the following intervention to meet STG and LTG's:  PT intervention is no longer required to meet  STG/LTG.    Recommendations:  This patient is ready to be discharged from therapy and continue their home treatment program.  Case is satisfied with his progress and independent in his home program. He is agreeable to continuing his program independently.   If there is more that the referring physician would like us to work on, we would be happy to continue treating Case with an updated order.      Please refer to the daily flowsheet for treatment today, total treatment time and time spent performing 1:1 timed codes.

## 2018-02-27 ENCOUNTER — TRANSFERRED RECORDS (OUTPATIENT)
Dept: HEALTH INFORMATION MANAGEMENT | Facility: CLINIC | Age: 58
End: 2018-02-27

## 2018-08-17 ENCOUNTER — OFFICE VISIT (OUTPATIENT)
Dept: FAMILY MEDICINE | Facility: CLINIC | Age: 58
End: 2018-08-17
Payer: COMMERCIAL

## 2018-08-17 VITALS
DIASTOLIC BLOOD PRESSURE: 87 MMHG | HEIGHT: 70 IN | TEMPERATURE: 97.4 F | HEART RATE: 69 BPM | BODY MASS INDEX: 25.95 KG/M2 | OXYGEN SATURATION: 98 % | WEIGHT: 181.25 LBS | SYSTOLIC BLOOD PRESSURE: 131 MMHG

## 2018-08-17 DIAGNOSIS — Z12.5 SCREENING FOR PROSTATE CANCER: ICD-10-CM

## 2018-08-17 DIAGNOSIS — R53.83 FATIGUE, UNSPECIFIED TYPE: ICD-10-CM

## 2018-08-17 DIAGNOSIS — Z12.11 SCREEN FOR COLON CANCER: ICD-10-CM

## 2018-08-17 DIAGNOSIS — E78.5 HYPERLIPIDEMIA LDL GOAL <100: ICD-10-CM

## 2018-08-17 DIAGNOSIS — Z00.00 ROUTINE GENERAL MEDICAL EXAMINATION AT A HEALTH CARE FACILITY: Primary | ICD-10-CM

## 2018-08-17 LAB
ALBUMIN SERPL-MCNC: 3.9 G/DL (ref 3.4–5)
ALP SERPL-CCNC: 78 U/L (ref 40–150)
ALT SERPL W P-5'-P-CCNC: 26 U/L (ref 0–70)
ANION GAP SERPL CALCULATED.3IONS-SCNC: 7 MMOL/L (ref 3–14)
AST SERPL W P-5'-P-CCNC: 23 U/L (ref 0–45)
BASOPHILS # BLD AUTO: 0 10E9/L (ref 0–0.2)
BASOPHILS NFR BLD AUTO: 0.2 %
BILIRUB SERPL-MCNC: 0.6 MG/DL (ref 0.2–1.3)
BUN SERPL-MCNC: 18 MG/DL (ref 7–30)
CALCIUM SERPL-MCNC: 8.6 MG/DL (ref 8.5–10.1)
CHLORIDE SERPL-SCNC: 106 MMOL/L (ref 94–109)
CHOLEST SERPL-MCNC: 170 MG/DL
CO2 SERPL-SCNC: 26 MMOL/L (ref 20–32)
CREAT SERPL-MCNC: 0.84 MG/DL (ref 0.66–1.25)
DIFFERENTIAL METHOD BLD: NORMAL
EOSINOPHIL # BLD AUTO: 0.1 10E9/L (ref 0–0.7)
EOSINOPHIL NFR BLD AUTO: 1.3 %
ERYTHROCYTE [DISTWIDTH] IN BLOOD BY AUTOMATED COUNT: 13.7 % (ref 10–15)
GFR SERPL CREATININE-BSD FRML MDRD: >90 ML/MIN/1.7M2
GLUCOSE SERPL-MCNC: 100 MG/DL (ref 70–99)
HCT VFR BLD AUTO: 44.7 % (ref 40–53)
HDLC SERPL-MCNC: 46 MG/DL
HGB BLD-MCNC: 15.1 G/DL (ref 13.3–17.7)
LDLC SERPL CALC-MCNC: 95 MG/DL
LYMPHOCYTES # BLD AUTO: 1.5 10E9/L (ref 0.8–5.3)
LYMPHOCYTES NFR BLD AUTO: 24.5 %
MCH RBC QN AUTO: 30.3 PG (ref 26.5–33)
MCHC RBC AUTO-ENTMCNC: 33.8 G/DL (ref 31.5–36.5)
MCV RBC AUTO: 90 FL (ref 78–100)
MONOCYTES # BLD AUTO: 0.7 10E9/L (ref 0–1.3)
MONOCYTES NFR BLD AUTO: 11.6 %
NEUTROPHILS # BLD AUTO: 3.7 10E9/L (ref 1.6–8.3)
NEUTROPHILS NFR BLD AUTO: 62.4 %
NONHDLC SERPL-MCNC: 124 MG/DL
PLATELET # BLD AUTO: 172 10E9/L (ref 150–450)
POTASSIUM SERPL-SCNC: 4.4 MMOL/L (ref 3.4–5.3)
PROT SERPL-MCNC: 7.1 G/DL (ref 6.8–8.8)
PSA SERPL-ACNC: 0.42 UG/L (ref 0–4)
RBC # BLD AUTO: 4.99 10E12/L (ref 4.4–5.9)
SODIUM SERPL-SCNC: 139 MMOL/L (ref 133–144)
TRIGL SERPL-MCNC: 145 MG/DL
TSH SERPL DL<=0.005 MIU/L-ACNC: 1.82 MU/L (ref 0.4–4)
WBC # BLD AUTO: 6 10E9/L (ref 4–11)

## 2018-08-17 PROCEDURE — 85025 COMPLETE CBC W/AUTO DIFF WBC: CPT | Performed by: FAMILY MEDICINE

## 2018-08-17 PROCEDURE — 80053 COMPREHEN METABOLIC PANEL: CPT | Performed by: FAMILY MEDICINE

## 2018-08-17 PROCEDURE — 84443 ASSAY THYROID STIM HORMONE: CPT | Performed by: FAMILY MEDICINE

## 2018-08-17 PROCEDURE — 36415 COLL VENOUS BLD VENIPUNCTURE: CPT | Performed by: FAMILY MEDICINE

## 2018-08-17 PROCEDURE — G0103 PSA SCREENING: HCPCS | Performed by: FAMILY MEDICINE

## 2018-08-17 PROCEDURE — 99396 PREV VISIT EST AGE 40-64: CPT | Performed by: FAMILY MEDICINE

## 2018-08-17 PROCEDURE — 80061 LIPID PANEL: CPT | Performed by: FAMILY MEDICINE

## 2018-08-17 ASSESSMENT — ENCOUNTER SYMPTOMS
ABDOMINAL PAIN: 0
FEVER: 0
SORE THROAT: 0
EYE PAIN: 0
DIZZINESS: 0
PARESTHESIAS: 0
SHORTNESS OF BREATH: 0
HEMATOCHEZIA: 0
WEAKNESS: 0
JOINT SWELLING: 0
CONSTIPATION: 0
NAUSEA: 0
HEARTBURN: 0
NERVOUS/ANXIOUS: 0
HEMATURIA: 0
HEADACHES: 0
DIARRHEA: 0
FREQUENCY: 0
ARTHRALGIAS: 1
PALPITATIONS: 0
COUGH: 0
CHILLS: 0
DYSURIA: 0
MYALGIAS: 0

## 2018-08-17 ASSESSMENT — PAIN SCALES - GENERAL: PAINLEVEL: NO PAIN (0)

## 2018-08-17 NOTE — PROGRESS NOTES
SUBJECTIVE:   CC: Case Epstein is an 58 year old male who presents for preventative health visit.     Physical   Annual:     Getting at least 3 servings of Calcium per day:  Yes    Bi-annual eye exam:  NO    Dental care twice a year:  NO    Sleep apnea or symptoms of sleep apnea:  None    Diet:  Regular (no restrictions)    Frequency of exercise:  2-3 days/week    Duration of exercise:  Less than 15 minutes    Taking medications regularly:  Yes    Medication side effects:  Not applicable    Additional concerns today:  No        None    Walks on breaks    At work mostly sitting and looking at computer    Ankle better        Today's PHQ-2 Score:   PHQ-2 ( 1999 [employer]) 8/17/2018   Q1: Little interest or pleasure in doing things 0   Q2: Feeling down, depressed or hopeless 0   PHQ-2 Score 0   Q1: Little interest or pleasure in doing things Not at all   Q2: Feeling down, depressed or hopeless Not at all   PHQ-2 Score 0       Abuse: Current or Past(Physical, Sexual or Emotional)- No  Do you feel safe in your environment - Yes    Social History   Substance Use Topics     Smoking status: Never Smoker     Smokeless tobacco: Never Used     Alcohol use No      Comment: occasional     Alcohol Use 8/17/2018   If you drink alcohol do you typically have greater than 3 drinks per day OR greater than 7 drinks per week? No   No flowsheet data found.    Last PSA:   PSA   Date Value Ref Range Status   08/16/2017 0.46 0 - 4 ug/L Final     Comment:     Assay Method:  Chemiluminescence using Siemens Vista analyzer       Reviewed orders with patient. Reviewed health maintenance and updated orders accordingly - Yes       Reviewed and updated as needed this visit by clinical staff  Tobacco  Allergies  Meds  Med Hx  Surg Hx  Fam Hx  Soc Hx        Reviewed and updated as needed this visit by Provider            Review of Systems   Constitutional: Negative for chills and fever.   HENT: Negative for congestion, ear pain, hearing  "loss and sore throat.    Eyes: Negative for pain and visual disturbance.   Respiratory: Negative for cough and shortness of breath.    Cardiovascular: Negative for chest pain, palpitations and peripheral edema.   Gastrointestinal: Negative for abdominal pain, constipation, diarrhea, heartburn, hematochezia and nausea.   Genitourinary: Negative for discharge, dysuria, frequency, genital sores, hematuria, impotence and urgency.   Musculoskeletal: Positive for arthralgias. Negative for joint swelling and myalgias.   Skin: Negative for rash.   Neurological: Negative for dizziness, weakness, headaches and paresthesias.   Psychiatric/Behavioral: The patient is not nervous/anxious.      CONSTITUTIONAL: NEGATIVE for fever, chills, change in weight  INTEGUMENTARY/SKIN: NEGATIVE for worrisome rashes, moles or lesions  EYES: NEGATIVE for vision changes or irritation  ENT: NEGATIVE for ear, mouth and throat problems  RESP: NEGATIVE for significant cough or SOB  CV: NEGATIVE for chest pain, palpitations or peripheral edema  GI: NEGATIVE for nausea, abdominal pain, heartburn, or change in bowel habits   male: negative for dysuria, hematuria, decreased urinary stream, erectile dysfunction, urethral discharge  MUSCULOSKELETAL: NEGATIVE for significant arthralgias or myalgia  NEURO: NEGATIVE for weakness, dizziness or paresthesias  PSYCHIATRIC: NEGATIVE for changes in mood or affect    OBJECTIVE:   /87 (BP Location: Left arm, Patient Position: Chair, Cuff Size: Adult Regular)  Pulse 69  Temp 97.4  F (36.3  C) (Oral)  Ht 5' 9.69\" (1.77 m)  Wt 181 lb 4 oz (82.2 kg)  SpO2 98%  BMI 26.24 kg/m2    Physical Exam  GENERAL: healthy, alert and no distress  EYES: Eyes grossly normal to inspection, PERRL and conjunctivae and sclerae normal  HENT: ear canals and TM's normal, nose and mouth without ulcers or lesions  NECK: no adenopathy, no asymmetry, masses, or scars and thyroid normal to palpation  RESP: lungs clear to " auscultation - no rales, rhonchi or wheezes  CV: regular rate and rhythm, normal S1 S2, no S3 or S4, no murmur, click or rub, no peripheral edema and peripheral pulses strong  ABDOMEN: soft, nontender, no hepatosplenomegaly, no masses and bowel sounds normal   (male): normal male genitalia without lesions or urethral discharge, no hernia  RECTAL: normal sphincter tone, no rectal masses, prostate normal size, smooth, nontender without nodules or masses  MS: no gross musculoskeletal defects noted, no edema  SKIN: no suspicious lesions or rashes  NEURO: Normal strength and tone, mentation intact and speech normal  PSYCH: mentation appears normal, affect normal/bright    Diagnostic Test Results:  none     ASSESSMENT/PLAN:   Case was seen today for physical and health maintenance.    Diagnoses and all orders for this visit:    Routine general medical examination at a health care facility    Screen for colon cancer  -     Fecal colorectal cancer screen (FIT); Future    Hyperlipidemia LDL goal <100  -     Lipid panel reflex to direct LDL Fasting  -     Comprehensive metabolic panel    Screening for prostate cancer  -     Prostate spec antigen screen    Fatigue, unspecified type  -     TSH with free T4 reflex  -     CBC with platelets differential    Other orders  -     Cancel: HIV Antigen Antibody Combo    Discussed several issues with patient  Overall health is stable  Keep working on healthy diet/exercise   Good result from ankle procedure.  Because of the new hardware, best to hold off colonoscopy for a year if possible.  We did give him the fit test to complete  Check labs   No std concern    COUNSELING:   Reviewed preventive health counseling, as reflected in patient instructions       Regular exercise       Healthy diet/nutrition       Vision screening       Safe sex practices/STD prevention       Prostate cancer screening    BP Readings from Last 1 Encounters:   08/17/18 131/87     Estimated body mass index is  "26.24 kg/(m^2) as calculated from the following:    Height as of this encounter: 5' 9.69\" (1.77 m).    Weight as of this encounter: 181 lb 4 oz (82.2 kg).    BP Screening:   Last 3 BP Readings:    BP Readings from Last 3 Encounters:   08/17/18 131/87   11/20/17 136/90   11/10/17 130/82       The following was recommended to the patient:  Re-screen BP within a year and recommended lifestyle modifications       reports that he has never smoked. He has never used smokeless tobacco.      Counseling Resources:  ATP IV Guidelines  Pooled Cohorts Equation Calculator  FRAX Risk Assessment  ICSI Preventive Guidelines  Dietary Guidelines for Americans, 2010  USDA's MyPlate  ASA Prophylaxis  Lung CA Screening    Tushar Granados MD  [facility]  Answers for HPI/ROS submitted by the patient on 8/17/2018   PHQ-2 Score: 0    "

## 2018-08-17 NOTE — MR AVS SNAPSHOT
After Visit Summary   8/17/2018    Case Epstein    MRN: [id number]           Patient Information     Date Of Birth          1960        Visit Information        Provider Department      8/17/2018 8:00 AM Tushar Granados MD [facility]        Today's Diagnoses     Screen for colon cancer    -  1    Hyperlipidemia LDL goal <100        Screening for prostate cancer        Fatigue, unspecified type          Care Instructions    We will send you lab results    Keep working on healthy diet/exercise     Return the FIT test          Follow-ups after your visit        Future tests that were ordered for you today     Open Future Orders        Priority Expected Expires Ordered    Fecal colorectal cancer screen (FIT) Routine 9/7/2018 11/9/2018 8/17/2018            Who to contact     If you have questions or need follow up information about today's clinic visit or your schedule please contact [facility] directly at [phone].  Normal or non-critical lab and imaging results will be communicated to you by [employer], letter or phone within 4 business days after the clinic has received the results. If you do not hear from us within 7 days, please contact the clinic through [employer] or phone. If you have a critical or abnormal lab result, we will notify you by phone as soon as possible.  Submit refill requests through [employer] or call your pharmacy and they will forward the refill request to us. Please allow 3 business days for your refill to be completed.          Additional Information About Your Visit        MyChart Information     [employer] gives you secure access to your electronic health record. If you see a primary care provider, you can also send messages to your care team and make appointments. If you have questions, please call your primary care clinic.  If you do not have a primary care provider, please call [phone] and they will assist you.       "  Care EveryWhere ID     This is your Care EveryWhere ID. This could be used by other organizations to access your [facility] medical records  [id number]        Your Vitals Were     Pulse Temperature Height Pulse Oximetry BMI (Body Mass Index)       69 97.4  F (36.3  C) (Oral) 5' 9.69\" (1.77 m) 98% 26.24 kg/m2        Blood Pressure from Last 3 Encounters:   08/17/18 131/87   11/20/17 136/90   11/10/17 130/82    Weight from Last 3 Encounters:   08/17/18 181 lb 4 oz (82.2 kg)   11/20/17 172 lb (78 kg)   11/10/17 172 lb (78 kg)              We Performed the Following     CBC with platelets differential     Comprehensive metabolic panel     Lipid panel reflex to direct LDL Fasting     Prostate spec antigen screen     TSH with free T4 reflex          Today's Medication Changes          These changes are accurate as of 8/17/18  8:26 AM.  If you have any questions, ask your nurse or doctor.               Stop taking these medicines if you haven't already. Please contact your care team if you have questions.     hydrOXYzine 25 MG tablet   Commonly known as:  ATARAX   Stopped by:  Tushar Granados MD                    Primary Care Provider Office Phone # Fax #    Tushar Granados [phone]       [address] [facility] [address]        Equal Access to Services     JOVAN MATT AH: Hadii santy espinoza hadasho Soomaali, waaxda luqadaha, qaybta kaalmada adeegyada, carmencita collins . So [facility] [phone].    ATENCIÓN: Si habla español, tiene a cifuentes disposición servicios gratuitos de asistencia lingüística. Llame al [phone].    We comply with applicable federal civil rights laws and [place] laws. We do not discriminate on the basis of race, color, national origin, age, disability, sex, sexual orientation, or gender identity.            Thank you!     Thank you for choosing [facility]  for your care. Our goal is always to provide you with excellent care. " Hearing back from our patients is one way we can continue to improve our services. Please take a few minutes to complete the written survey that you may receive in the mail after your visit with us. Thank you!             Your Updated Medication List - Protect others around you: Learn how to safely use, store and throw away your medicines at www.disposemymeds.org.          This list is accurate as of 8/17/18  8:26 AM.  Always use your most recent med list.                   Brand Name Dispense Instructions for use Diagnosis    aspirin 81 MG tablet      Take 1 tablet by mouth daily.        BOOST PO           KRILL OIL PO      Take 300 mg by mouth        multivitamin, therapeutic with minerals Tabs tablet      Take 1 tablet by mouth daily        order for DME     1 Device    Equipment being ordered: crutches, adult    Left ankle pain, unspecified chronicity, Secondary osteoarthritis of left ankle       OVER-THE-COUNTER      Take 1 Dose by mouth daily Joint Juice        TURMERIC PO      Take 1,050 mg by mouth        VIACTIV PO      Take 1 chew tab by mouth daily        VITAMIN B 12 PO      Take 2,500 mg by mouth        VITAMIN E PO      Take 1,000 Units by mouth daily

## 2021-04-02 ENCOUNTER — OFFICE VISIT (OUTPATIENT)
Dept: FAMILY MEDICINE | Facility: CLINIC | Age: 61
End: 2021-04-02
Payer: COMMERCIAL

## 2021-04-02 VITALS
TEMPERATURE: 97.6 F | DIASTOLIC BLOOD PRESSURE: 85 MMHG | BODY MASS INDEX: 24.55 KG/M2 | WEIGHT: 171.5 LBS | SYSTOLIC BLOOD PRESSURE: 136 MMHG | HEIGHT: 70 IN | HEART RATE: 84 BPM

## 2021-04-02 DIAGNOSIS — S49.92XA SHOULDER INJURY, LEFT, INITIAL ENCOUNTER: ICD-10-CM

## 2021-04-02 DIAGNOSIS — R53.83 FATIGUE, UNSPECIFIED TYPE: ICD-10-CM

## 2021-04-02 DIAGNOSIS — Z00.00 ROUTINE GENERAL MEDICAL EXAMINATION AT A HEALTH CARE FACILITY: Primary | ICD-10-CM

## 2021-04-02 DIAGNOSIS — M17.11 PRIMARY OSTEOARTHRITIS OF RIGHT KNEE: ICD-10-CM

## 2021-04-02 DIAGNOSIS — Z12.11 SCREEN FOR COLON CANCER: ICD-10-CM

## 2021-04-02 DIAGNOSIS — Z13.6 CARDIOVASCULAR SCREENING; LDL GOAL LESS THAN 100: ICD-10-CM

## 2021-04-02 DIAGNOSIS — Z12.5 SCREENING FOR PROSTATE CANCER: ICD-10-CM

## 2021-04-02 DIAGNOSIS — M25.551 HIP PAIN, RIGHT: ICD-10-CM

## 2021-04-02 DIAGNOSIS — R73.01 IMPAIRED FASTING GLUCOSE: ICD-10-CM

## 2021-04-02 LAB
BASOPHILS # BLD AUTO: 0 10E9/L (ref 0–0.2)
BASOPHILS NFR BLD AUTO: 0.2 %
DIFFERENTIAL METHOD BLD: NORMAL
EOSINOPHIL # BLD AUTO: 0.1 10E9/L (ref 0–0.7)
EOSINOPHIL NFR BLD AUTO: 0.9 %
ERYTHROCYTE [DISTWIDTH] IN BLOOD BY AUTOMATED COUNT: 13.9 % (ref 10–15)
HBA1C MFR BLD: 5.2 % (ref 0–5.6)
HCT VFR BLD AUTO: 49 % (ref 40–53)
HGB BLD-MCNC: 16.2 G/DL (ref 13.3–17.7)
LYMPHOCYTES # BLD AUTO: 2 10E9/L (ref 0.8–5.3)
LYMPHOCYTES NFR BLD AUTO: 24.3 %
MCH RBC QN AUTO: 29.6 PG (ref 26.5–33)
MCHC RBC AUTO-ENTMCNC: 33.1 G/DL (ref 31.5–36.5)
MCV RBC AUTO: 90 FL (ref 78–100)
MONOCYTES # BLD AUTO: 0.8 10E9/L (ref 0–1.3)
MONOCYTES NFR BLD AUTO: 9.6 %
NEUTROPHILS # BLD AUTO: 5.2 10E9/L (ref 1.6–8.3)
NEUTROPHILS NFR BLD AUTO: 65 %
PLATELET # BLD AUTO: 200 10E9/L (ref 150–450)
RBC # BLD AUTO: 5.47 10E12/L (ref 4.4–5.9)
WBC # BLD AUTO: 8 10E9/L (ref 4–11)

## 2021-04-02 PROCEDURE — 80050 GENERAL HEALTH PANEL: CPT | Performed by: FAMILY MEDICINE

## 2021-04-02 PROCEDURE — 36415 COLL VENOUS BLD VENIPUNCTURE: CPT | Performed by: FAMILY MEDICINE

## 2021-04-02 PROCEDURE — G0103 PSA SCREENING: HCPCS | Performed by: FAMILY MEDICINE

## 2021-04-02 PROCEDURE — 83036 HEMOGLOBIN GLYCOSYLATED A1C: CPT | Performed by: FAMILY MEDICINE

## 2021-04-02 PROCEDURE — 99396 PREV VISIT EST AGE 40-64: CPT | Performed by: FAMILY MEDICINE

## 2021-04-02 PROCEDURE — 80061 LIPID PANEL: CPT | Performed by: FAMILY MEDICINE

## 2021-04-02 SDOH — ECONOMIC STABILITY: TRANSPORTATION INSECURITY
IN THE PAST 12 MONTHS, HAS LACK OF TRANSPORTATION KEPT YOU FROM MEETINGS, WORK, OR FROM GETTING THINGS NEEDED FOR DAILY LIVING?: NOT ASKED

## 2021-04-02 SDOH — ECONOMIC STABILITY: TRANSPORTATION INSECURITY
IN THE PAST 12 MONTHS, HAS THE LACK OF TRANSPORTATION KEPT YOU FROM MEDICAL APPOINTMENTS OR FROM GETTING MEDICATIONS?: NOT ASKED

## 2021-04-02 SDOH — ECONOMIC STABILITY: FOOD INSECURITY: WITHIN THE PAST 12 MONTHS, THE FOOD YOU BOUGHT JUST DIDN'T LAST AND YOU DIDN'T HAVE MONEY TO GET MORE.: NOT ASKED

## 2021-04-02 SDOH — ECONOMIC STABILITY: FOOD INSECURITY: WITHIN THE PAST 12 MONTHS, YOU WORRIED THAT YOUR FOOD WOULD RUN OUT BEFORE YOU GOT MONEY TO BUY MORE.: NOT ASKED

## 2021-04-02 SDOH — ECONOMIC STABILITY: INCOME INSECURITY: HOW HARD IS IT FOR YOU TO PAY FOR THE VERY BASICS LIKE FOOD, HOUSING, MEDICAL CARE, AND HEATING?: NOT ASKED

## 2021-04-02 ASSESSMENT — MIFFLIN-ST. JEOR: SCORE: 1589.17

## 2021-04-02 ASSESSMENT — PAIN SCALES - GENERAL: PAINLEVEL: NO PAIN (0)

## 2021-04-02 NOTE — PATIENT INSTRUCTIONS
Preventive Health Recommendations  Male Ages 50 - 64    Yearly exam:             See your health care provider every year in order to  o   Review health changes.   o   Discuss preventive care.    o   Review your medicines if your doctor has prescribed any.     Have a cholesterol test every 5 years, or more frequently if you are at risk for high cholesterol/heart disease.     Have a diabetes test (fasting glucose) every three years. If you are at risk for diabetes, you should have this test more often.     Have a colonoscopy at age 50, or have a yearly FIT test (stool test). These exams will check for colon cancer.      Talk with your health care provider about whether or not a prostate cancer screening test (PSA) is right for you.    You should be tested each year for STDs (sexually transmitted diseases), if you re at risk.     Shots: Get a flu shot each year. Get a tetanus shot every 10 years.     Nutrition:    Eat at least 5 servings of fruits and vegetables daily.     Eat whole-grain bread, whole-wheat pasta and brown rice instead of white grains and rice.     Get adequate Calcium and Vitamin D.     Lifestyle    Exercise for at least 150 minutes a week (30 minutes a day, 5 days a week). This will help you control your weight and prevent disease.     Limit alcohol to one drink per day.     No smoking.     Wear sunscreen to prevent skin cancer.     See your dentist every six months for an exam and cleaning.     See your eye doctor every 1 to 2 years.        Keep working on healthy diet/exercise     [facility]    We will send you lab results    See eye doctor     Dentist     Return stool FIT test    Schedule with orthopedics right hip, right knee, left shoulder

## 2021-04-02 NOTE — PROGRESS NOTES
3  SUBJECTIVE:   CC: Case Epstein is an 60 year old male who presents for preventive health visit.        Patient has been advised of split billing requirements and indicates understanding: Yes  Healthy Habits:    Do you get at least three servings of calcium containing foods daily (dairy, green leafy vegetables, etc.)? yes    Amount of exercise or daily activities, outside of work: 7 day(s) per week    Problems taking medications regularly No    Medication side effects: No    Have you had an eye exam in the past two years? no    Do you see a dentist twice per year? no    Do you have sleep apnea, excessive snoring or daytime drowsiness?no           Today's PHQ-2 Score:   PHQ-2 (  [employer]) 2021   Q1: Little interest or pleasure in doing things 0 0   Q2: Feeling down, depressed or hopeless 0 0   PHQ-2 Score 0 0   Q1: Little interest or pleasure in doing things - Not at all   Q2: Feeling down, depressed or hopeless - Not at all   PHQ-2 Score - 0       Abuse: Current or Past(Physical, Sexual or Emotional)- No  Do you feel safe in your environment? Yes        Social History     Tobacco Use     Smoking status: Never Smoker     Smokeless tobacco: Never Used   Substance Use Topics     Alcohol use: No     Comment: occasional     If you drink alcohol do you typically have >3 drinks per day or >7 drinks per week? No                      Last PSA:   PSA   Date Value Ref Range Status   2018 0.42 0 - 4 ug/L Final     Comment:     Assay Method:  Chemiluminescence using Siemens Vista analyzer       Reviewed orders with patient. 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    No prescription meds    Taking vitamins and supplements off and on     Some nsaids     Using strech bands for exercise    Just walking around house     4000 steps daily    Eating healthy            OBJECTIVE:   BP (!) 145/95 (BP Location: Left arm, Patient Position: Chair, Cuff Size: Adult Regular)   Pulse 84   Temp 97.6  F (36.4  C) (Oral)   Ht 1.77 m (5' 9.69\")   Wt 77.8 kg (171 lb 8 oz)   BMI 24.83 kg/m    EXAM:  GENERAL: healthy, alert and no distress  EYES: Eyes grossly normal to inspection, PERRL and conjunctivae and sclerae normal  HENT: ear canals and TM's normal, nose and mouth without ulcers or lesions  NECK: no adenopathy, no asymmetry, masses, or scars and thyroid normal to palpation  RESP: lungs clear to auscultation - no rales, rhonchi or wheezes  CV: regular rate and rhythm, normal S1 S2, no S3 or S4, no murmur, click or rub, no peripheral edema and peripheral pulses strong  ABDOMEN: soft, nontender, no hepatosplenomegaly, no masses and bowel sounds normal  MS: quite limited flexion of right knee and limited int/ ext rotation of right hip  Also quite limited range of motion of left shoulder   SKIN: no suspicious lesions or rashes  NEURO: Normal strength and tone, mentation intact and speech normal  PSYCH: mentation appears normal, affect normal/bright    Diagnostic Test Results:  Labs reviewed in Epic    ASSESSMENT/PLAN:   Case was seen today for physical and health maintenance.    Diagnoses and all orders for this visit:    Routine general medical examination at a health care facility    Shoulder injury, left, initial encounter  -     Orthopedic & Spine  Referral; Future    Primary osteoarthritis of right knee  -     Orthopedic & Spine  Referral; Future    Hip pain, right  -     Orthopedic & Spine  Referral; Future    Screen for colon cancer  -     Fecal " "colorectal cancer screen (FIT); Future    Fatigue, unspecified type  -     Comprehensive metabolic panel  -     TSH with free T4 reflex  -     CBC with platelets differential    CARDIOVASCULAR SCREENING; LDL GOAL LESS THAN 100  -     Lipid panel reflex to direct LDL Non-fasting    Impaired fasting glucose  -     Hemoglobin A1c    Screening for prostate cancer  -     Prostate spec antigen screen    discussed multiple issues with patient   Check labs almost  Fasting today  Not on prescription meds  Patient to schedule eye exam  I urged dental visit also  Keep working on healthy diet/exercise   Patient needs to see orthopedics.  Left shoulder is the new major concern, but the right hip and right knee are also very significant.  Fit test.  Patient declined colonoscopy for now.  Urged patient to get covid vaccine.     Patient has been advised of split billing requirements and indicates understanding: Yes  COUNSELING:  Reviewed preventive health counseling, as reflected in patient instructions       Regular exercise       Healthy diet/nutrition       Vision screening    Estimated body mass index is 24.83 kg/m  as calculated from the following:    Height as of this encounter: 1.77 m (5' 9.69\").    Weight as of this encounter: 77.8 kg (171 lb 8 oz).        He reports that he has never smoked. He has never used smokeless tobacco.      Counseling Resources:  ATP IV Guidelines  Pooled Cohorts Equation Calculator  FRAX Risk Assessment  ICSI Preventive Guidelines  Dietary Guidelines for Americans, 2010  [employer]'s MyPlate  ASA Prophylaxis  Lung CA Screening    Tushar Granados MD  [facility]  "

## 2021-04-05 LAB
ALBUMIN SERPL-MCNC: 4.4 G/DL (ref 3.4–5)
ALP SERPL-CCNC: 87 U/L (ref 40–150)
ALT SERPL W P-5'-P-CCNC: 30 U/L (ref 0–70)
ANION GAP SERPL CALCULATED.3IONS-SCNC: 5 MMOL/L (ref 3–14)
AST SERPL W P-5'-P-CCNC: 22 U/L (ref 0–45)
BILIRUB SERPL-MCNC: 0.6 MG/DL (ref 0.2–1.3)
BUN SERPL-MCNC: 17 MG/DL (ref 7–30)
CALCIUM SERPL-MCNC: 9.3 MG/DL (ref 8.5–10.1)
CHLORIDE SERPL-SCNC: 105 MMOL/L (ref 94–109)
CHOLEST SERPL-MCNC: 189 MG/DL
CO2 SERPL-SCNC: 27 MMOL/L (ref 20–32)
CREAT SERPL-MCNC: 0.93 MG/DL (ref 0.66–1.25)
GFR SERPL CREATININE-BSD FRML MDRD: 89 ML/MIN/{1.73_M2}
GLUCOSE SERPL-MCNC: 86 MG/DL (ref 70–99)
HDLC SERPL-MCNC: 49 MG/DL
LDLC SERPL CALC-MCNC: 115 MG/DL
NONHDLC SERPL-MCNC: 140 MG/DL
POTASSIUM SERPL-SCNC: 4.3 MMOL/L (ref 3.4–5.3)
PROT SERPL-MCNC: 7.5 G/DL (ref 6.8–8.8)
PSA SERPL-ACNC: 0.44 UG/L (ref 0–4)
SODIUM SERPL-SCNC: 137 MMOL/L (ref 133–144)
TRIGL SERPL-MCNC: 127 MG/DL
TSH SERPL DL<=0.005 MIU/L-ACNC: 2.54 MU/L (ref 0.4–4)

## 2021-04-05 NOTE — RESULT ENCOUNTER NOTE
"Your LDL \"bad\" cholesterol is only slightly elevated.  Keep working on healthy diet/exercise.    Other labs are all fine.    Tushar Granados MD  "

## 2021-04-07 DIAGNOSIS — Z12.11 SCREEN FOR COLON CANCER: ICD-10-CM

## 2021-04-07 PROCEDURE — 82274 ASSAY TEST FOR BLOOD FECAL: CPT | Performed by: FAMILY MEDICINE

## 2021-04-08 ENCOUNTER — ANCILLARY PROCEDURE (OUTPATIENT)
Dept: GENERAL RADIOLOGY | Facility: CLINIC | Age: 61
End: 2021-04-08
Attending: ORTHOPAEDIC SURGERY
Payer: COMMERCIAL

## 2021-04-08 ENCOUNTER — OFFICE VISIT (OUTPATIENT)
Dept: ORTHOPEDICS | Facility: CLINIC | Age: 61
End: 2021-04-08
Attending: FAMILY MEDICINE
Payer: COMMERCIAL

## 2021-04-08 ENCOUNTER — TELEPHONE (OUTPATIENT)
Dept: ORTHOPEDICS | Facility: CLINIC | Age: 61
End: 2021-04-08

## 2021-04-08 VITALS
DIASTOLIC BLOOD PRESSURE: 90 MMHG | HEIGHT: 70 IN | SYSTOLIC BLOOD PRESSURE: 158 MMHG | BODY MASS INDEX: 24.94 KG/M2 | WEIGHT: 174.2 LBS | HEART RATE: 101 BPM

## 2021-04-08 DIAGNOSIS — G89.29 CHRONIC PAIN OF RIGHT KNEE: ICD-10-CM

## 2021-04-08 DIAGNOSIS — M16.11 PRIMARY OSTEOARTHRITIS OF RIGHT HIP: ICD-10-CM

## 2021-04-08 DIAGNOSIS — M25.561 CHRONIC PAIN OF RIGHT KNEE: ICD-10-CM

## 2021-04-08 DIAGNOSIS — M17.11 PRIMARY OSTEOARTHRITIS OF RIGHT KNEE: Primary | ICD-10-CM

## 2021-04-08 DIAGNOSIS — M25.551 HIP PAIN, RIGHT: ICD-10-CM

## 2021-04-08 PROCEDURE — 99244 OFF/OP CNSLTJ NEW/EST MOD 40: CPT | Performed by: ORTHOPAEDIC SURGERY

## 2021-04-08 PROCEDURE — 73562 X-RAY EXAM OF KNEE 3: CPT | Mod: RT | Performed by: RADIOLOGY

## 2021-04-08 PROCEDURE — 73502 X-RAY EXAM HIP UNI 2-3 VIEWS: CPT | Mod: RT | Performed by: RADIOLOGY

## 2021-04-08 RX ORDER — COVID-19 ANTIGEN TEST
220 KIT MISCELLANEOUS 2 TIMES DAILY WITH MEALS
Status: ON HOLD | COMMUNITY
End: 2023-05-31

## 2021-04-08 RX ORDER — IBUPROFEN 200 MG
200 TABLET ORAL EVERY 4 HOURS PRN
COMMUNITY

## 2021-04-08 ASSESSMENT — KOOS JR
BENDING TO THE FLOOR TO PICK UP OBJECT: MODERATE
STRAIGHTENING KNEE FULLY: MILD
HOW SEVERE IS YOUR KNEE STIFFNESS AFTER FIRST WAKING IN MORNING: MILD
TWISING OR PIVOTING ON KNEE: MODERATE
RISING FROM SITTING: MILD
STANDING UPRIGHT: MILD
GOING UP OR DOWN STAIRS: MILD
KOOS JR SCORING: 63.78

## 2021-04-08 ASSESSMENT — PAIN SCALES - GENERAL: PAINLEVEL: MILD PAIN (3)

## 2021-04-08 ASSESSMENT — MIFFLIN-ST. JEOR: SCORE: 1601.5

## 2021-04-08 NOTE — PROGRESS NOTES
"Chief Complaint:   Chief Complaint   Patient presents with     Right Hip - Pain     Onset: 8/2020. Patient notes that one day he bent down to put on his sock on the right foot and he couldn't bend all the way down. Pain is in the groin area. Walking and a lot of moving causes increased pain. No tx.      Case Epstein is seen today in the [facility] for evaluation of right hip pain at the request of Dr. Tushar Granados       HISTORY OF PRESENT ILLNESS    Case Epstein is a 60 year old male seen for evaluation of ongoing right hip pain with no known injury.   Pain has been present since 8/2020. He notes one day he bent down to put on his sock on the right foot and couldn't bend all the way down. Locates pain to the groin area. Pain is worse with walking, moving. Ok at rest sitting, not bad at night, once in a while. Difficulties getting in/out of car, donning shoes/socks. Thinks a little shorter on the right compared to the left, but thinks more due to right knee osteoarthritis and \"bowing out\". Takes occasional Aleve or ibuprofen, not regularly.    Denies prior right hip problems until 8/2020. Denies low back pain, numbness and tingling.    Ongoing right knee pain for years. In 2011 had xrays showing significant osteoarthritis at that time. Had cortisone at that time as well. Pain diffuse, mostly along the inner aspect. Doesn't have full range of motion. He's done Aleve, ibuprofen, heat.      History left ankle fusion. Also some problems/pain with left shoulder.    Pain severity: 3/10  Pain quality: aching      Other PMH:  has a past medical history of High cholesterol and OA (OSTEOARTHRITIS) OF KNEE - right (9/27/2011). He also has no past medical history of Bleeding disorder (H), Cancer (H), Congestive heart failure, unspecified, COPD (chronic obstructive pulmonary disease) (H), Depressive disorder, Diabetes (H), Heart disease, History of blood transfusion, Hypertension, " "Inflammatory arthritis, Kidney disease, Stroke (H), Surgical complications, Thyroid disease, Type II or unspecified type diabetes mellitus without mention of complication, not stated as uncontrolled, Uncomplicated asthma, or Unspecified asthma(493.90).  Patient Active Problem List   Diagnosis     OA (OSTEOARTHRITIS) OF KNEE - right     Vitamin D deficiency disease     Advanced directives, counseling/discussion     CARDIOVASCULAR SCREENING; LDL GOAL LESS THAN 100     Pain in joint, ankle and foot, left       Surgical Hx:  has a past surgical history that includes PALATE/UVULA SURGERY UNLISTED (age 2 and in 1986); colonoscopy; and Arthrodesis ankle (Left, 11/20/2017).    Medications:   Current Outpatient Medications:      aspirin 81 MG tablet, Take 1 tablet by mouth daily., Disp: , Rfl:      ibuprofen (ADVIL/MOTRIN) 200 MG tablet, Take 200 mg by mouth every 4 hours as needed for mild pain, Disp: , Rfl:      naproxen sodium 220 MG capsule, Take 220 mg by mouth 2 times daily (with meals), Disp: , Rfl:     Allergies: No Known Allergies    Social Hx: retired.  reports that he has never smoked. He has never used smokeless tobacco. He reports that he does not drink alcohol or use drugs.    Family Hx: family history includes Arthritis in his maternal grandmother and mother; Cerebrovascular Disease in his maternal grandfather and maternal grandmother; Diabetes in his maternal grandmother; Eye Disorder in his brother and mother; Hypertension in his mother; Other - See Comments in his brother and father.    REVIEW OF SYSTEMS:  10 point ROS neg other than the symptoms noted above in the HPI and PAST MEDICAL HISTORY. Notables,  CONSTITUTIONAL:NEGATIVE for fever, chills, change in weight  INTEGUMENTARY/SKIN: NEGATIVE for worrisome rashes, moles or lesions  MUSCULOSKELETAL:See HPI above  NEURO: NEGATIVE for weakness, dizziness or paresthesias    PHYSICAL EXAM:  BP (!) 158/90   Pulse 101   Ht 1.77 m (5' 9.69\")   Wt 79 kg (174 lb " 3.2 oz)   BMI 25.22 kg/m     GENERAL APPEARANCE: healthy, alert, no distress  SKIN: no suspicious lesions or rashes  NEURO: Normal strength and tone, mentation intact and speech normal  PSYCH:  mentation appears normal and affect normal  RESPIRATORY: No increased work of breathing.  LYMPH: no palpable inguinal lymph nodes.    BILATERAL LOWER EXTREMITIES:  Gait: antalgic favoring right , varus thrust right   Severe right  varus alignment. Left varus alignment.  No gross deformities or masses.  Bilateral Quad atrophy, strength weak. Noted left calf atrophy.  Intact sensation deep peroneal nerve, superficial peroneal nerve, med/lat tibial nerve, sural nerve, saphenous nerve  Intact EHL, EDL, TA, FHL, GS, quadriceps hamstrings and hip flexors  Toes warm and well perfused, brisk capillary refill. Palpable 2+ dp pulses.  Bilateral calf soft and nttp or squeeze.  DTRs: achilles 2+, patella 2+.  Edema: trace  Leg lengths: right appears shorter than left at medial malleolus.    LEFT HIP EXAM:      Palpation: Tender:   none  Strength:  4+/5  Special tests:  Irritability (flexion/adduction/internal rotation) mild positive.  Hip range of motion: Internal rotation: 10, External rotation: 40, Flexion 90 with obligatory external rotation.      RIGHT HIP EXAM:    Palpation: Tender:   Right groin  Strength:  4/5  Special tests:  Irritability (flexion/adduction/internal rotation) positive   Hip range of motion: Internal rotation: 5, External rotation: 30, Flexion 85 with obligatory external rotation, pain with extremes of rotation    LEFT KNEE EXAM:    Skin: intact, no ecchymosis or erythema; posterolateral irregular scars  ROM: full extension to 130 flexion  Tight hamstrings on straight leg raise.  Effusion: none  Tender: NTTP med/lat joint line, anterior or posterior knee  McMurrays: negative    MCL: stable, and non-painful at both 0 and 30 degrees knee flexion  Varus stress: stable, and non-painful at both 0 and 30 degrees knee  "flexion  Lachmans: neg, firm endpoint  Posterior Drawer stable  Patellofemoral joint:                Apprehension: negative              Crepitations: mild   Grind: positive.    RIGHT KNEE EXAM:    Skin: intact, no ecchymosis or erythema  ROM: 7 extension to 85 flexion  Tight hamstrings on straight leg raise.  Effusion: trace  Tender: medial joint line, pes, anteromedial and anterolateral knee  Nontender to palpation posterior knee    Varus/valgus laxity with endpoint  Lachmans: neg, firm endpoint  Posterior Drawer stable  Patellofemoral joint:                Apprehension: negative              Crepitations: moderate   Grind: positive.        X-RAY: AP pelvis and AP/Lateral views right hip from 4/8/2021 were reviewed in clinic today. No obvious fractures or dislocations. Moderate-severe right hip degenerative changes with loss of superior joint space with adjacent subchondral sclerosis of the acetabulum. Marginal osteophytes. Mild left hip degenerative changes.    3 views right knee 4/8/2021 reviewed, Medial compartment osteoarthritis with prominent bone on bone joint space narrowing with articular flattening, subchondral sclerosis and noted medial tibial wear. Medial patellofemoral osteoarthritis with narrowing at the far-medial aspect of the joint. There is a small bony density medial to the medial femoral condyle, which could represent an articular body.        Impression: 59yo male with:  1) chronic right knee pain, end-stage primary osteoarthritis   2) chronic right hip pain, advanced right hip primary osteoarthritis      Plan:   * reviewed imaging studies with patient, showing arthritis of the knee and hip. Arthritis is wearing of the cartilage due to longstanding \"wear and tear\" or can follow an injury to the joint. The right knee has worsened sinc 2011, and my concern is of continued medial tibial wear.  * I think this is far more advanced than your right hip, which too, is arthritic.  * each of them is " advanced enough for surgical consideration, total joint arthroplasty  * given significant progression and wear of the right knee, I'd think that is more of a priority than the hip.    Discussed typical symptoms of arthritic pain is pain aggravated with weight bearing activities, stiffness, relieved by sitting or rest. Swelling may be associated. It is common to have some grinding and popping in the knee with arthritis.    Discussed various treatments for degenerative arthrosis of the knee, including nonoperative and operative approaches. Nonoperative approaches, which are exhausted prior to operative treatment, include doing nothing and living with the pain as patient has been doing, activity modification (avoid aggravating activities), physical therapy and strengthening exercises, weight loss, anti-inflammatory medications, bracing, ambulatory aids (cane, walker) and injections. Once these have been tried and are deemed unsuccessful with a good effort, and the patient is an appropriate candidate, the next treatment would be knee arthroplasty or replacement. Depending on the location of the arthritis, knee replacement can be partial (one side of the knee affected by arthritis) or a total knee arthroplasty (all 3 compartments). The risks, perceived benefits and perioperative rehabilitation expectations of knee arthroplasty were discussed in detail. Also discussed that approximately 10% of patients that undergo knee arthroplasty are not happy following surgery and may have worse pain or no improvement in pain, contrary to their preoperative expectations.    ** Risks of surgery include, but not limited to: bleeding (possibly requiring transfusion), infection, pain, scar, damage to adjacent structures (e.g. Nerves, blood vessels, bone, cartilage), temporary or permanent nerve damage, recurrence of symptoms, implant dislocation, instability, implant failure, implant infection, unequal limb lengths, stiffness, need for  "further surgery, blood clots, pulmonary embolism, risks of anesthesia, and death.  * the knee will not feel \"normal\" as it won't be \"normal\" after knee replacement. It may feel \"clunky\" due to the nature of the hard metal and plastic implant.  * the expectation is for improved pain, not necessarily complete pain relief.    ** understanding these risks, the patient would like to proceed with surgery: RIGHT total knee arthroplasty. May need stemmed tibial component, possibly augments. This will be a cemented knee.    * will arrange RIGHT total knee arthroplasty at a mutual convenience in the near future  * discussed will plan hospitalization overnight,  daily Physical Therapy starting either the day of or day after surgery, with discharge to home or short term rehabilitation facility, depending on postoperative recovery and progress during hospitalization.  * will plan postoperative deep vein thrombosis prophylaxis x4 weeks. Additionally, graduated compression stockings x4 weeks, and SCDs while in the hospital.  * postoperative pain control will be oral medications upon discharge from hospital  * postoperative Physical Therapy to start upon discharge from the hospital.  * patient will need preoperative H+P prior to surgery from PCP.  * will see patient 2 weeks postoperative, sooner as needed, for wound check, suture removal, and xrays. Will obtain AP, lateral and sunrise views of the knee at that time.    * patient encouraged to call in interim if any new questions or concerns arise.    * recommend no elective dental procedures for 4-6 months after surgery. Any emergency dental procedures, mouth infections, abscesses, etc must be taken care of immediately with preventive antibiotics.   * Patient will need longterm prophylactic antibiotics use with dental procedures or other invasive procedures (2 g amoxicilin or 450mg ([id number]) clindamycin) 1 hour prior to dental procedures for a minimum of 2 years " postoperative.        Baseline KOOS Jr today    Preop information packet/scrub/wipes today    Preop covid pcr test ordered today (to be obtained 4 days prior to surgery)      Dennis Randle M.D., M.S.  Dept. of Orthopaedic Surgery  [facility]

## 2021-04-08 NOTE — LETTER
"    4/8/2021         RE: Case Epstein  [address]  [place] [address]        Dear Colleague,    Thank you for referring your patient, Case Epstein, to the [facility] [place]. Please see a copy of my visit note below.    Chief Complaint:   Chief Complaint   Patient presents with     Right Hip - Pain     Onset: 8/2020. Patient notes that one day he bent down to put on his sock on the right foot and he couldn't bend all the way down. Pain is in the groin area. Walking and a lot of moving causes increased pain. No tx.      Case Epstein is seen today in the [facility] for evaluation of right hip pain at the request of Dr. Tushar Granados       HISTORY OF PRESENT ILLNESS    Case Epstein is a 60 year old male seen for evaluation of ongoing right hip pain with no known injury.   Pain has been present since 8/2020. He notes one day he bent down to put on his sock on the right foot and couldn't bend all the way down. Locates pain to the groin area. Pain is worse with walking, moving. Ok at rest sitting, not bad at night, once in a while. Difficulties getting in/out of car, donning shoes/socks. Thinks a little shorter on the right compared to the left, but thinks more due to right knee osteoarthritis and \"bowing out\". Takes occasional Aleve or ibuprofen, not regularly.    Denies prior right hip problems until 8/2020. Denies low back pain, numbness and tingling.    Ongoing right knee pain for years. In 2011 had xrays showing significant osteoarthritis at that time. Had cortisone at that time as well. Pain diffuse, mostly along the inner aspect. Doesn't have full range of motion. He's done Aleve, ibuprofen, heat.      History left ankle fusion. Also some problems/pain with left shoulder.    Pain severity: 3/10  Pain quality: aching      Other PMH:  has a past medical history of High cholesterol and OA (OSTEOARTHRITIS) OF KNEE - right (9/27/2011). " He also has no past medical history of Bleeding disorder (H), Cancer (H), Congestive heart failure, unspecified, COPD (chronic obstructive pulmonary disease) (H), Depressive disorder, Diabetes (H), Heart disease, History of blood transfusion, Hypertension, Inflammatory arthritis, Kidney disease, Stroke (H), Surgical complications, Thyroid disease, Type II or unspecified type diabetes mellitus without mention of complication, not stated as uncontrolled, Uncomplicated asthma, or Unspecified asthma(493.90).  Patient Active Problem List   Diagnosis     OA (OSTEOARTHRITIS) OF KNEE - right     Vitamin D deficiency disease     Advanced directives, counseling/discussion     CARDIOVASCULAR SCREENING; LDL GOAL LESS THAN 100     Pain in joint, ankle and foot, left       Surgical Hx:  has a past surgical history that includes PALATE/UVULA SURGERY UNLISTED (age 2 and in 1986); colonoscopy; and Arthrodesis ankle (Left, 11/20/2017).    Medications:   Current Outpatient Medications:      aspirin 81 MG tablet, Take 1 tablet by mouth daily., Disp: , Rfl:      ibuprofen (ADVIL/MOTRIN) 200 MG tablet, Take 200 mg by mouth every 4 hours as needed for mild pain, Disp: , Rfl:      naproxen sodium 220 MG capsule, Take 220 mg by mouth 2 times daily (with meals), Disp: , Rfl:     Allergies: No Known Allergies    Social Hx: retired.  reports that he has never smoked. He has never used smokeless tobacco. He reports that he does not drink alcohol or use drugs.    Family Hx: family history includes Arthritis in his maternal grandmother and mother; Cerebrovascular Disease in his maternal grandfather and maternal grandmother; Diabetes in his maternal grandmother; Eye Disorder in his brother and mother; Hypertension in his mother; Other - See Comments in his brother and father.    REVIEW OF SYSTEMS:  10 point ROS neg other than the symptoms noted above in the HPI and PAST MEDICAL HISTORY. Notables,  CONSTITUTIONAL:NEGATIVE for fever, chills, change  "in weight  INTEGUMENTARY/SKIN: NEGATIVE for worrisome rashes, moles or lesions  MUSCULOSKELETAL:See HPI above  NEURO: NEGATIVE for weakness, dizziness or paresthesias    PHYSICAL EXAM:  BP (!) 158/90   Pulse 101   Ht 1.77 m (5' 9.69\")   Wt 79 kg (174 lb 3.2 oz)   BMI 25.22 kg/m     GENERAL APPEARANCE: healthy, alert, no distress  SKIN: no suspicious lesions or rashes  NEURO: Normal strength and tone, mentation intact and speech normal  PSYCH:  mentation appears normal and affect normal  RESPIRATORY: No increased work of breathing.  LYMPH: no palpable inguinal lymph nodes.    BILATERAL LOWER EXTREMITIES:  Gait: antalgic favoring right , varus thrust right   Severe right  varus alignment. Left varus alignment.  No gross deformities or masses.  Bilateral Quad atrophy, strength weak. Noted left calf atrophy.  Intact sensation deep peroneal nerve, superficial peroneal nerve, med/lat tibial nerve, sural nerve, saphenous nerve  Intact EHL, EDL, TA, FHL, GS, quadriceps hamstrings and hip flexors  Toes warm and well perfused, brisk capillary refill. Palpable 2+ dp pulses.  Bilateral calf soft and nttp or squeeze.  DTRs: achilles 2+, patella 2+.  Edema: trace  Leg lengths: right appears shorter than left at medial malleolus.    LEFT HIP EXAM:      Palpation: Tender:   none  Strength:  4+/5  Special tests:  Irritability (flexion/adduction/internal rotation) mild positive.  Hip range of motion: Internal rotation: 10, External rotation: 40, Flexion 90 with obligatory external rotation.      RIGHT HIP EXAM:    Palpation: Tender:   Right groin  Strength:  4/5  Special tests:  Irritability (flexion/adduction/internal rotation) positive   Hip range of motion: Internal rotation: 5, External rotation: 30, Flexion 85 with obligatory external rotation, pain with extremes of rotation    LEFT KNEE EXAM:    Skin: intact, no ecchymosis or erythema; posterolateral irregular scars  ROM: full extension to 130 flexion  Tight hamstrings on " straight leg raise.  Effusion: none  Tender: NTTP med/lat joint line, anterior or posterior knee  McMurrays: negative    MCL: stable, and non-painful at both 0 and 30 degrees knee flexion  Varus stress: stable, and non-painful at both 0 and 30 degrees knee flexion  Lachmans: neg, firm endpoint  Posterior Drawer stable  Patellofemoral joint:                Apprehension: negative              Crepitations: mild   Grind: positive.    RIGHT KNEE EXAM:    Skin: intact, no ecchymosis or erythema  ROM: 7 extension to 85 flexion  Tight hamstrings on straight leg raise.  Effusion: trace  Tender: medial joint line, pes, anteromedial and anterolateral knee  Nontender to palpation posterior knee    Varus/valgus laxity with endpoint  Lachmans: neg, firm endpoint  Posterior Drawer stable  Patellofemoral joint:                Apprehension: negative              Crepitations: moderate   Grind: positive.        X-RAY: AP pelvis and AP/Lateral views right hip from 4/8/2021 were reviewed in clinic today. No obvious fractures or dislocations. Moderate-severe right hip degenerative changes with loss of superior joint space with adjacent subchondral sclerosis of the acetabulum. Marginal osteophytes. Mild left hip degenerative changes.    3 views right knee 4/8/2021 reviewed, Medial compartment osteoarthritis with prominent bone on bone joint space narrowing with articular flattening, subchondral sclerosis and noted medial tibial wear. Medial patellofemoral osteoarthritis with narrowing at the far-medial aspect of the joint. There is a small bony density medial to the medial femoral condyle, which could represent an articular body.        Impression: 61yo male with:  1) chronic right knee pain, end-stage primary osteoarthritis   2) chronic right hip pain, advanced right hip primary osteoarthritis      Plan:   * reviewed imaging studies with patient, showing arthritis of the knee and hip. Arthritis is wearing of the cartilage due to  "longstanding \"wear and tear\" or can follow an injury to the joint. The right knee has worsened sinc 2011, and my concern is of continued medial tibial wear.  * I think this is far more advanced than your right hip, which too, is arthritic.  * each of them is advanced enough for surgical consideration, total joint arthroplasty  * given significant progression and wear of the right knee, I'd think that is more of a priority than the hip.    Discussed typical symptoms of arthritic pain is pain aggravated with weight bearing activities, stiffness, relieved by sitting or rest. Swelling may be associated. It is common to have some grinding and popping in the knee with arthritis.    Discussed various treatments for degenerative arthrosis of the knee, including nonoperative and operative approaches. Nonoperative approaches, which are exhausted prior to operative treatment, include doing nothing and living with the pain as patient has been doing, activity modification (avoid aggravating activities), physical therapy and strengthening exercises, weight loss, anti-inflammatory medications, bracing, ambulatory aids (cane, walker) and injections. Once these have been tried and are deemed unsuccessful with a good effort, and the patient is an appropriate candidate, the next treatment would be knee arthroplasty or replacement. Depending on the location of the arthritis, knee replacement can be partial (one side of the knee affected by arthritis) or a total knee arthroplasty (all 3 compartments). The risks, perceived benefits and perioperative rehabilitation expectations of knee arthroplasty were discussed in detail. Also discussed that approximately 10% of patients that undergo knee arthroplasty are not happy following surgery and may have worse pain or no improvement in pain, contrary to their preoperative expectations.    ** Risks of surgery include, but not limited to: bleeding (possibly requiring transfusion), infection, pain, " "scar, damage to adjacent structures (e.g. Nerves, blood vessels, bone, cartilage), temporary or permanent nerve damage, recurrence of symptoms, implant dislocation, instability, implant failure, implant infection, unequal limb lengths, stiffness, need for further surgery, blood clots, pulmonary embolism, risks of anesthesia, and death.  * the knee will not feel \"normal\" as it won't be \"normal\" after knee replacement. It may feel \"clunky\" due to the nature of the hard metal and plastic implant.  * the expectation is for improved pain, not necessarily complete pain relief.    ** understanding these risks, the patient would like to proceed with surgery: RIGHT total knee arthroplasty. May need stemmed tibial component, possibly augments. This will be a cemented knee.    * will arrange RIGHT total knee arthroplasty at a mutual convenience in the near future  * discussed will plan hospitalization overnight,  daily Physical Therapy starting either the day of or day after surgery, with discharge to home or short term rehabilitation facility, depending on postoperative recovery and progress during hospitalization.  * will plan postoperative deep vein thrombosis prophylaxis x4 weeks. Additionally, graduated compression stockings x4 weeks, and SCDs while in the hospital.  * postoperative pain control will be oral medications upon discharge from hospital  * postoperative Physical Therapy to start upon discharge from the hospital.  * patient will need preoperative H+P prior to surgery from PCP.  * will see patient 2 weeks postoperative, sooner as needed, for wound check, suture removal, and xrays. Will obtain AP, lateral and sunrise views of the knee at that time.    * patient encouraged to call in interim if any new questions or concerns arise.    * recommend no elective dental procedures for 4-6 months after surgery. Any emergency dental procedures, mouth infections, abscesses, etc must be taken care of immediately with " preventive antibiotics.   * Patient will need longterm prophylactic antibiotics use with dental procedures or other invasive procedures (2 g amoxicilin or 450mg (0m711bi) clindamycin) 1 hour prior to dental procedures for a minimum of 2 years postoperative.        Baseline KOOS Jr today    Preop information packet/scrub/wipes today    Preop covid pcr test ordered today (to be obtained 4 days prior to surgery)      Dennis Randle M.D., M.S.  Dept. of Orthopaedic Surgery  [facility]              Again, thank you for allowing me to participate in the care of your patient.        Sincerely,        Dennis Randle MD

## 2021-04-09 NOTE — TELEPHONE ENCOUNTER
Type of surgery: Right total knee arthroplasty   CPT 85475   Primary osteoarthritis of right knee M17.11     Hip pain, right M25.551     Chronic pain of right knee M25.561, G89.29    Location of surgery: [facility]  Date and time of surgery: 05/11/2021  Surgeon: Jer  Pre-Op Appt Date: 05/05/2021  Post-Op Appt Date: 05/26/2021   Packet sent out: Yes  Pre-cert/Authorization completed:    Per [facility]:  Precertification is not required for:    Outpatient services  Date: 04*/09/21    Thank you,   Muriel Birmingham   Prior Authorization Department  [phone]

## 2021-04-10 LAB — HEMOCCULT STL QL IA: NEGATIVE

## 2021-04-11 ENCOUNTER — HEALTH MAINTENANCE LETTER (OUTPATIENT)
Age: 61
End: 2021-04-11

## 2021-05-05 ENCOUNTER — OFFICE VISIT (OUTPATIENT)
Dept: FAMILY MEDICINE | Facility: CLINIC | Age: 61
End: 2021-05-05
Payer: COMMERCIAL

## 2021-05-05 VITALS
DIASTOLIC BLOOD PRESSURE: 85 MMHG | HEART RATE: 83 BPM | WEIGHT: 173.38 LBS | BODY MASS INDEX: 25.1 KG/M2 | SYSTOLIC BLOOD PRESSURE: 132 MMHG | OXYGEN SATURATION: 99 % | TEMPERATURE: 98.5 F

## 2021-05-05 DIAGNOSIS — Z01.818 PREOP GENERAL PHYSICAL EXAM: Primary | ICD-10-CM

## 2021-05-05 DIAGNOSIS — M17.11 PRIMARY OSTEOARTHRITIS OF RIGHT KNEE: ICD-10-CM

## 2021-05-05 LAB
ANION GAP SERPL CALCULATED.3IONS-SCNC: 2 MMOL/L (ref 3–14)
BASOPHILS # BLD AUTO: 0 10E9/L (ref 0–0.2)
BASOPHILS NFR BLD AUTO: 0.3 %
BUN SERPL-MCNC: 17 MG/DL (ref 7–30)
CALCIUM SERPL-MCNC: 9.2 MG/DL (ref 8.5–10.1)
CHLORIDE SERPL-SCNC: 106 MMOL/L (ref 94–109)
CO2 SERPL-SCNC: 29 MMOL/L (ref 20–32)
CREAT SERPL-MCNC: 0.86 MG/DL (ref 0.66–1.25)
DIFFERENTIAL METHOD BLD: NORMAL
EOSINOPHIL # BLD AUTO: 0.1 10E9/L (ref 0–0.7)
EOSINOPHIL NFR BLD AUTO: 1.2 %
ERYTHROCYTE [DISTWIDTH] IN BLOOD BY AUTOMATED COUNT: 14.1 % (ref 10–15)
GFR SERPL CREATININE-BSD FRML MDRD: >90 ML/MIN/{1.73_M2}
GLUCOSE SERPL-MCNC: 93 MG/DL (ref 70–99)
HCT VFR BLD AUTO: 46.3 % (ref 40–53)
HGB BLD-MCNC: 15.4 G/DL (ref 13.3–17.7)
LYMPHOCYTES # BLD AUTO: 1.3 10E9/L (ref 0.8–5.3)
LYMPHOCYTES NFR BLD AUTO: 22.3 %
MCH RBC QN AUTO: 29.6 PG (ref 26.5–33)
MCHC RBC AUTO-ENTMCNC: 33.3 G/DL (ref 31.5–36.5)
MCV RBC AUTO: 89 FL (ref 78–100)
MONOCYTES # BLD AUTO: 0.6 10E9/L (ref 0–1.3)
MONOCYTES NFR BLD AUTO: 10.6 %
NEUTROPHILS # BLD AUTO: 3.8 10E9/L (ref 1.6–8.3)
NEUTROPHILS NFR BLD AUTO: 65.6 %
PLATELET # BLD AUTO: 174 10E9/L (ref 150–450)
POTASSIUM SERPL-SCNC: 4.2 MMOL/L (ref 3.4–5.3)
RBC # BLD AUTO: 5.21 10E12/L (ref 4.4–5.9)
SODIUM SERPL-SCNC: 137 MMOL/L (ref 133–144)
WBC # BLD AUTO: 5.7 10E9/L (ref 4–11)

## 2021-05-05 PROCEDURE — 99214 OFFICE O/P EST MOD 30 MIN: CPT | Performed by: FAMILY MEDICINE

## 2021-05-05 PROCEDURE — 36415 COLL VENOUS BLD VENIPUNCTURE: CPT | Performed by: FAMILY MEDICINE

## 2021-05-05 PROCEDURE — 80048 BASIC METABOLIC PNL TOTAL CA: CPT | Performed by: FAMILY MEDICINE

## 2021-05-05 PROCEDURE — 85025 COMPLETE CBC W/AUTO DIFF WBC: CPT | Performed by: FAMILY MEDICINE

## 2021-05-05 ASSESSMENT — PAIN SCALES - GENERAL: PAINLEVEL: NO PAIN (0)

## 2021-05-05 NOTE — PATIENT INSTRUCTIONS

## 2021-05-06 ENCOUNTER — TELEPHONE (OUTPATIENT)
Dept: ORTHOPEDICS | Facility: CLINIC | Age: 61
End: 2021-05-06

## 2021-05-06 DIAGNOSIS — M17.11 PRIMARY OSTEOARTHRITIS OF RIGHT KNEE: Primary | ICD-10-CM

## 2021-05-06 NOTE — TELEPHONE ENCOUNTER
Called pt and left vm to call and schedule as orders are in place.    Humera PILLAI RN Specialty Triage 5/6/2021 1:51 PM

## 2021-05-06 NOTE — TELEPHONE ENCOUNTER
Patient scheduled PT appt but was told no order in system from Dr. Randle.  Patient wants to make sure one is in place before his first visit on May 13th. Please call him to let him know when it is done.

## 2021-05-07 DIAGNOSIS — M17.11 PRIMARY OSTEOARTHRITIS OF RIGHT KNEE: ICD-10-CM

## 2021-05-07 LAB
LABORATORY COMMENT REPORT: NORMAL
SARS-COV-2 RNA RESP QL NAA+PROBE: NEGATIVE
SARS-COV-2 RNA RESP QL NAA+PROBE: NORMAL
SPECIMEN SOURCE: NORMAL
SPECIMEN SOURCE: NORMAL

## 2021-05-07 PROCEDURE — U0005 INFEC AGEN DETEC AMPLI PROBE: HCPCS | Performed by: ORTHOPAEDIC SURGERY

## 2021-05-07 PROCEDURE — U0003 INFECTIOUS AGENT DETECTION BY NUCLEIC ACID (DNA OR RNA); SEVERE ACUTE RESPIRATORY SYNDROME CORONAVIRUS 2 (SARS-COV-2) (CORONAVIRUS DISEASE [COVID-19]), AMPLIFIED PROBE TECHNIQUE, MAKING USE OF HIGH THROUGHPUT TECHNOLOGIES AS DESCRIBED BY CMS-2020-01-R: HCPCS | Performed by: ORTHOPAEDIC SURGERY

## 2021-05-13 ENCOUNTER — THERAPY VISIT (OUTPATIENT)
Dept: PHYSICAL THERAPY | Facility: CLINIC | Age: 61
End: 2021-05-13
Payer: COMMERCIAL

## 2021-05-13 DIAGNOSIS — R60.0 LOCALIZED EDEMA: ICD-10-CM

## 2021-05-13 DIAGNOSIS — M17.11 PRIMARY OSTEOARTHRITIS OF RIGHT KNEE: ICD-10-CM

## 2021-05-13 DIAGNOSIS — Z47.1 AFTERCARE FOLLOWING RIGHT KNEE JOINT REPLACEMENT SURGERY: Primary | ICD-10-CM

## 2021-05-13 DIAGNOSIS — Z96.651 AFTERCARE FOLLOWING RIGHT KNEE JOINT REPLACEMENT SURGERY: Primary | ICD-10-CM

## 2021-05-13 PROCEDURE — 97010 HOT OR COLD PACKS THERAPY: CPT | Performed by: PHYSICAL THERAPIST

## 2021-05-13 PROCEDURE — 97530 THERAPEUTIC ACTIVITIES: CPT | Performed by: PHYSICAL THERAPIST

## 2021-05-13 PROCEDURE — 97161 PT EVAL LOW COMPLEX 20 MIN: CPT | Performed by: PHYSICAL THERAPIST

## 2021-05-13 ASSESSMENT — ACTIVITIES OF DAILY LIVING (ADL)
GO UP STAIRS: I AM UNABLE TO DO THE ACTIVITY
STIFFNESS: THE SYMPTOM AFFECTS MY ACTIVITY MODERATELY
AS_A_RESULT_OF_YOUR_KNEE_INJURY,_HOW_WOULD_YOU_RATE_YOUR_CURRENT_LEVEL_OF_DAILY_ACTIVITY?: SEVERELY ABNORMAL
SIT WITH YOUR KNEE BENT: I AM UNABLE TO DO THE ACTIVITY
STAND: ACTIVITY IS FAIRLY DIFFICULT
HOW_WOULD_YOU_RATE_THE_OVERALL_FUNCTION_OF_YOUR_KNEE_DURING_YOUR_USUAL_DAILY_ACTIVITIES?: SEVERELY ABNORMAL
RAW_SCORE: 19
RISE FROM A CHAIR: ACTIVITY IS FAIRLY DIFFICULT
GIVING WAY, BUCKLING OR SHIFTING OF KNEE: I HAVE THE SYMPTOM BUT IT DOES NOT AFFECT MY ACTIVITY
HOW_WOULD_YOU_RATE_THE_CURRENT_FUNCTION_OF_YOUR_KNEE_DURING_YOUR_USUAL_DAILY_ACTIVITIES_ON_A_SCALE_FROM_0_TO_100_WITH_100_BEING_YOUR_LEVEL_OF_KNEE_FUNCTION_PRIOR_TO_YOUR_INJURY_AND_0_BEING_THE_INABILITY_TO_PERFORM_ANY_OF_YOUR_USUAL_DAILY_ACTIVITIES?: 25
SQUAT: I AM UNABLE TO DO THE ACTIVITY
LIMPING: THE SYMPTOM AFFECTS MY ACTIVITY MODERATELY
WALK: ACTIVITY IS VERY DIFFICULT
GO DOWN STAIRS: I AM UNABLE TO DO THE ACTIVITY
SWELLING: THE SYMPTOM AFFECTS MY ACTIVITY MODERATELY
WEAKNESS: THE SYMPTOM AFFECTS MY ACTIVITY MODERATELY
KNEE_ACTIVITY_OF_DAILY_LIVING_SUM: 19
KNEEL ON THE FRONT OF YOUR KNEE: I AM UNABLE TO DO THE ACTIVITY
PAIN: THE SYMPTOM AFFECTS MY ACTIVITY MODERATELY
KNEE_ACTIVITY_OF_DAILY_LIVING_SCORE: 27.14

## 2021-05-13 NOTE — PROGRESS NOTES
[facility] Physical Therapy Initial Evaluation  5/13/2021     Precautions/Restrictions/MD instructions: Evaluate and Treat for R knee pain; S/p R TKA on 5/11/2021    Therapist Assessment: Case Epstein is a 61 year old male patient presenting to Physical Therapy with R knee discomfort following R TKA on 5/11/2021. Patient demonstrates notably swelling throughout R knee and lower leg, swelling blisters, tenderness to palpation of R calf, notably decreased R knee mobility, and inability to bear full weight on R LE. Signs and symptoms are consistent post-operative status, as well as potential infection (therfore, pt advised to return to ED today, 5/13/21). These impairments limit their ability to ambulate, perform stairs, and perform functional transfers. Skilled PT services are necessary in order to reduce impairments and improve independent function.    Subjective:     Injury/Condition Details:  Presenting Complaint Post-operative R knee pain   Onset Timing/Date DOS: 5/11/2021   Mechanism R TKA. Prior to surgery, has been dealing with R knee pain for quite some time     Symptom Behavior Details    Primary Symptoms Constant symptoms; worsen with activity, pain (Location: General R knee, Quality: Aching/Throbbing and Tender), stiffness, swelling, blisters throughout R LE   Worst Pain 5/10   Symptom Provocators Walking, stairs, bending knee, weightbearing   Best Pain 2/10    Symptom Relievers Ice   Time of day dependent? No   Recent symptom change? Improved since surgery     Prior Testing/Intervention for current condition:  Prior Tests  x-ray in April 2021:  IMPRESSION: Medial compartment osteoarthritis with prominent joint space narrowing. Medial patellofemoral osteoarthritis with narrowing at the far-medial aspect of the joint. There is a small bony density medial to the medial femoral condyle, which could represent an articular body.     GIGI JURADO MD   Prior Treatment Injections: Cortisone (mildly  helpful); R TKA as noted above     Lifestyle & General Medical History:  Employment Retired   Usual physical activities  (within past year) Walking, stairs, prolonged sitting   Orthopaedic History  Hx of L ankle fusion and previous L leg injury when he was younger   Medication  Anti-inflammatory   Notable medical history See Epic Chart   Patient goals Walking independently   Patient Reported Health good     Red Flags: (Bold when present) - reviewed the following and denies  Calf pain/swelling/warmth, malaise, unexplained weight loss, night pain, fever, swelling blisters throughout R LE    Objective:    Observation: notable bruising throughout R knee/lower leg; yellow-colored blisters throughout R lower leg    Functional:   - Pt required moderate assistance from PT to rise from chair and transfer to seated position on plinth    Gait: Pt ambulates with step-to gait pattern, notably decreased weight shift to R LE, and heavy reliance on 2WW    Palpation: R knee very warm to the touch; notably tender to palpation of R calf    Other:  - Swelling (knee joint line) L= 35 cm, R = 44 cm    KNEE: (* indicates patient's primary complaint)   PROM R PROM L AROM R AROM L MMT R MMT L   Ext   0-2 5-0 NT NT   Flx   30* 138 NT NT     Quadriceps Muscle Activation Right Left   Isometric Quad Activation NT Good   Straight Leg Raising NT No extensor lag     ASSESSMENT/PLAN  Patient is a 61 year old male with R knee complaints.    Patient has the following significant findings with corresponding treatment plan.                Diagnosis 1:  R knee pain s/p R TKA; signs and symptoms consistent with post-operative status and potential infection  Pain -  hot/cold therapy, electric stimulation, manual therapy, splint/taping/bracing/orthotics, self management, education and home program  Decreased ROM/flexibility - manual therapy, therapeutic exercise and home program  Decreased joint mobility - manual therapy, therapeutic exercise and home  program  Decreased strength - therapeutic exercise, therapeutic activities and home program  Inflammation - cold therapy, electric stimulation and self management/home program  Edema - vasopneumatics, electric stimulation, cold therapy, cryocuff and self management/home program  Impaired gait - gait training, assistive devices and home program  Impaired muscle performance - electric stimulation, neuro re-education and home program  Decreased function - therapeutic activities and home program    Therapy Evaluation Codes:   1) History comprised of:   Personal factors that impact the plan of care:      Living environment.    Comorbidity factors that impact the plan of care are:      None.     Medications impacting care: Anti-inflammatory.  2) Examination of Body Systems comprised of:   Body structures and functions that impact the plan of care:      Knee.   Activity limitations that impact the plan of care are:      Bathing, Bending, Cooking, Driving, Dressing, Lifting, Sitting, Squatting/kneeling, Stairs, Standing, Walking, Sleeping and Laying down.  3) Clinical presentation characteristics are:   Evolving/Changing.  4) Decision-Making    Moderate complexity using standardized patient assessment instrument and/or measureable assessment of functional outcome.  Cumulative Therapy Evaluation is: Low complexity.    Previous and current functional limitations:  (See Goal Flow Sheet for this information)    Short term and Long term goals: (See Goal Flow Sheet for this information)     Communication ability:  Patient appears to be able to clearly communicate and understand verbal and written communication and follow directions correctly.  Treatment Explanation - The following has been discussed with the patient:   RX ordered/plan of care  Anticipated outcomes  Possible risks and side effects  This patient would benefit from PT intervention to resume normal activities once infection/DVT ruled out.  Rehab potential is good. PT  does express some concerns regarding pt living at home without adequate help, as his overall function is quite limited. This was mentioned to MD.    Frequency:  2 X week, once daily  Duration:  for 3 weeks; tapering to 1x/wk for 6 wks  Discharge Plan:  Achieve all LTG.  Independent in home treatment program.  Reach maximal therapeutic benefit.    Please refer to the daily flowsheet for treatment today, total treatment time and time spent performing 1:1 timed codes.

## 2021-05-17 ENCOUNTER — OFFICE VISIT (OUTPATIENT)
Dept: ORTHOPEDICS | Facility: CLINIC | Age: 61
End: 2021-05-17
Payer: COMMERCIAL

## 2021-05-17 VITALS
HEART RATE: 96 BPM | HEIGHT: 70 IN | SYSTOLIC BLOOD PRESSURE: 136 MMHG | WEIGHT: 173 LBS | BODY MASS INDEX: 24.77 KG/M2 | DIASTOLIC BLOOD PRESSURE: 80 MMHG

## 2021-05-17 DIAGNOSIS — T81.89XA SWELLING OF SURGICAL SITE, INITIAL ENCOUNTER: ICD-10-CM

## 2021-05-17 DIAGNOSIS — Z96.651 STATUS POST TOTAL RIGHT KNEE REPLACEMENT: Primary | ICD-10-CM

## 2021-05-17 DIAGNOSIS — R60.9 SWELLING OF SURGICAL SITE, INITIAL ENCOUNTER: ICD-10-CM

## 2021-05-17 PROCEDURE — 99024 POSTOP FOLLOW-UP VISIT: CPT | Performed by: ORTHOPAEDIC SURGERY

## 2021-05-17 RX ORDER — CEPHALEXIN 500 MG/1
500 CAPSULE ORAL
COMMUNITY
Start: 2021-05-13 | End: 2021-05-20

## 2021-05-17 RX ORDER — ASPIRIN 325 MG
TABLET ORAL
COMMUNITY
Start: 2021-05-12 | End: 2021-08-02

## 2021-05-17 RX ORDER — OXYCODONE HYDROCHLORIDE 5 MG/1
5-10 TABLET ORAL
COMMUNITY
Start: 2021-05-12 | End: 2021-08-02

## 2021-05-17 RX ORDER — ACETAMINOPHEN 500 MG
500-1000 TABLET ORAL EVERY 6 HOURS PRN
Status: ON HOLD | COMMUNITY
End: 2023-05-31

## 2021-05-17 ASSESSMENT — MIFFLIN-ST. JEOR: SCORE: 1591.05

## 2021-05-17 ASSESSMENT — PAIN SCALES - GENERAL: PAINLEVEL: NO PAIN (0)

## 2021-05-17 NOTE — PROGRESS NOTES
Chief Complaint   Patient presents with     Right Knee - Total Joint Post-op     DOS 5/11/12. Patient is 1 wk s/p. Patient presents to clinic using a walker as an aid. Patient notes his knee is doing pretty good. He denies any pain today. He has be wrapping his leg with an ACE wrap.          SURGERY: Total knee arthroplasty, right knee ([facility])  DATE OF SURGERY: 5/11/2021      HISTORY OF PRESENT ILLNESS: Case Epstein is a 61 year old male seen for postoperative evaluation of right total knee arthroplasty. It has been 6 days since surgery. Returns today for wound and swelling recheck. He was discharged on 5/12/2021, but then presented to the ED on 5/13/2021 with swelling and swelling blisters. These were dressed and started on a 7 day course of keflex four times daily. He hadn't been elevating. He saw his therapist on 5/13/2021 and was told to go to the ED. States doing overall doing well, not much pain. Has been wrapping his leg with ACE wrap. Going to Physical Therapy. Using walker.      Denies fevers chills night sweats. Continues to take aspirin for anti-coagulation for deep vein thrombosis prophylaxis. Use of pain medications has been tylenol. Has gone to Physical Therapy x1 session. Concerns today include: swelling.    Pain 0/10.    Past medical history:   Past Medical History:   Diagnosis Date     High cholesterol      OA (OSTEOARTHRITIS) OF KNEE - right 9/27/2011     Patient Active Problem List    Diagnosis Date Noted     Aftercare following right knee joint replacement surgery 05/13/2021     Priority: Medium     Localized edema 05/13/2021     Priority: Medium     Pain in joint, ankle and foot, left 01/08/2018     Priority: Medium     CARDIOVASCULAR SCREENING; LDL GOAL LESS THAN 100 08/16/2017     Priority: Medium     Advanced directives, counseling/discussion 07/03/2015     Priority: Medium     Advance Care Planning 7/7/2015: ACP Review and Resources Provided:  Reviewed chart for advance  "care plan.  Case Epstein has no plan or code status on file. Discussed available resources and provided with information. Confirmed code status reflects current choices pending further ACP discussions.  Confirmed/documented legally designated decision maker(s).  Added by Noemi Sales             Vitamin D deficiency disease 06/26/2013     Priority: Medium     OA (OSTEOARTHRITIS) OF KNEE - right 09/27/2011     Priority: Medium       Past Surgical History:   Past Surgical History:   Procedure Laterality Date     ARTHRODESIS ANKLE Left 11/20/2017    Procedure: ARTHRODESIS ANKLE;  LEFT ANKLE FUSION (C-ARM);  Surgeon: Shaggy Linn MD;  Location: [facility] PALATE/UVULA SURGERY UNLISTED  age 2 and in 1986    2 procedures for palate/nasal issues     COLONOSCOPY         Medications:   Current Outpatient Medications:      ibuprofen (ADVIL/MOTRIN) 200 MG tablet, Take 200 mg by mouth every 4 hours as needed for mild pain, Disp: , Rfl:      naproxen sodium 220 MG capsule, Take 220 mg by mouth 2 times daily (with meals), Disp: , Rfl:     Allergies: No Known Allergies      REVIEW OF SYSTEMS:  CONSTITUTIONAL:NEGATIVE for fever, chills, night sweats, change in weight  INTEGUMENTARY/SKIN: NEGATIVE for worrisome rashes, moles or lesions  MUSCULOSKELETAL:See HPI above  NEURO: NEGATIVE for weakness, dizziness or paresthesias  CARDIOVASCULAR: negative for chest pain, shortness of breath    PHYSICAL EXAM:  /80   Pulse 96   Ht 1.77 m (5' 9.69\")   Wt 78.5 kg (173 lb)   BMI 25.04 kg/m     GENERAL APPEARANCE: healthy, alert, no distress  SKIN: no suspicious lesions or rashes  NEURO: Normal strength and tone, mentation intact and speech normal  PSYCH:  mentation appears normal and affect normal  RESPIRATORY: No increased work of breathing.    BILATERAL LOWER EXTREMITIES:  Gait: using walker for support, favors the right.    Intact sensation deep peroneal nerve, superficial peroneal nerve, med/lat tibial " nerve, sural nerve, saphenous nerve  Intact EHL, EDL, TA, FHL, GS, quadriceps hamstrings and hip flexors  Toes warm and well perfused, brisk capillary refill. Palpable 2+ dp pulses.  Bilateral calf soft and nttp or squeeze.  Edema: 1+, Pitting    right  KNEE EXAM:    Incision: dried skin glue. There are a couple small blisters at incision.  There are significant blisters of the entire leg from the knee to the ankle filled with clear yellow fluid. A couple have ruptured.  There is diffuse ecchymosis. No significant erythema.  ROM: 5 extension to 60 flexion  Effusion: large  Tender: diffuse      X-RAY:  no new images today.      Impression: Case Epstein is a 61 year old male status post Total knee arthroplasty, right knee, increased postoperative swelling with blisters, 1 weeks out from surgery.      Plan:   * suspect patient hadn't been elevating since surgery, thus developed significant swelling upon discharge at home and swelling blisters.  * he needs to lay back and get his foot above the knee and knee above the heart. Upon discussion, it was clear that he was not elevating. We demonstrated appropriate elevation.  * recommend strict elevation as much as possible, day and night.  * wound cares for blisters, discussed today.  * continue DVT prophylaxis for a total of 4 weeks postoperative  * continue with knee range of motion exercises, focusing on extension  * wean off all pain medications as tolerated    * reassess 2 days for wound check, dressing change, sooner if needed. He can leave dressing alone as long as remains clean and dry. If blisters rupture and dressing saturates, recommend changing dressing or return to clinic for dressing change.  * continue with Physical Therapy, home exercise program.  * all questions addressed and answered prior to discharge from clinic today to patient's satisfaction.  * patient will need longterm prophylactic antibiotics use with dental procedures or other invasive  procedures (2 g amoxicilin or 450mg ([id number]) clindamycin) 1 hour prior to dental procedures.    * KOOS Jr/Promis Knee score: NOT to be done at next visit; NOT completed during todays visit; to be completed at 12 weeks and 12 months postoperative.      Dennis Randle M.D., M.S.  Dept. of Orthopaedic Surgery  [facility]

## 2021-05-17 NOTE — LETTER
5/17/2021         RE: Case Epstein  [address] [facility]  [place] [address]        Dear Colleague,    Thank you for referring your patient, Case Epstein, to the [facility] [place]. Please see a copy of my visit note below.    Chief Complaint   Patient presents with     Right Knee - Total Joint Post-op     DOS 5/11/12. Patient is 1 wk s/p. Patient presents to clinic using a walker as an aid. Patient notes his knee is doing pretty good. He denies any pain today. He has be wrapping his leg with an ACE wrap.          SURGERY: Total knee arthroplasty, right knee ([facility])  DATE OF SURGERY: 5/11/2021      HISTORY OF PRESENT ILLNESS: Case Epstein is a 61 year old male seen for postoperative evaluation of right total knee arthroplasty. It has been 6 days since surgery. Returns today for wound and swelling recheck. He was discharged on 5/12/2021, but then presented to the ED on 5/13/2021 with swelling and swelling blisters. These were dressed and started on a 7 day course of keflex four times daily. He hadn't been elevating. He saw his therapist on 5/13/2021 and was told to go to the ED. States doing overall doing well, not much pain. Has been wrapping his leg with ACE wrap. Going to Physical Therapy. Using walker.      Denies fevers chills night sweats. Continues to take aspirin for anti-coagulation for deep vein thrombosis prophylaxis. Use of pain medications has been tylenol. Has gone to Physical Therapy x1 session. Concerns today include: swelling.    Pain 0/10.    Past medical history:   Past Medical History:   Diagnosis Date     High cholesterol      OA (OSTEOARTHRITIS) OF KNEE - right 9/27/2011     Patient Active Problem List    Diagnosis Date Noted     Aftercare following right knee joint replacement surgery 05/13/2021     Priority: Medium     Localized edema 05/13/2021     Priority: Medium     Pain in joint, ankle and foot, left 01/08/2018     Priority: Medium      "CARDIOVASCULAR SCREENING; LDL GOAL LESS THAN 100 08/16/2017     Priority: Medium     Advanced directives, counseling/discussion 07/03/2015     Priority: Medium     Advance Care Planning 7/7/2015: ACP Review and Resources Provided:  Reviewed chart for advance care plan.  Case Epstein has no plan or code status on file. Discussed available resources and provided with information. Confirmed code status reflects current choices pending further ACP discussions.  Confirmed/documented legally designated decision maker(s).  Added by Noemi Sales             Vitamin D deficiency disease 06/26/2013     Priority: Medium     OA (OSTEOARTHRITIS) OF KNEE - right 09/27/2011     Priority: Medium       Past Surgical History:   Past Surgical History:   Procedure Laterality Date     ARTHRODESIS ANKLE Left 11/20/2017    Procedure: ARTHRODESIS ANKLE;  LEFT ANKLE FUSION (C-ARM);  Surgeon: Shaggy Linn MD;  Location: [facility] PALATE/UVULA SURGERY UNLISTED  age 2 and in 1986    2 procedures for palate/nasal issues     COLONOSCOPY         Medications:   Current Outpatient Medications:      ibuprofen (ADVIL/MOTRIN) 200 MG tablet, Take 200 mg by mouth every 4 hours as needed for mild pain, Disp: , Rfl:      naproxen sodium 220 MG capsule, Take 220 mg by mouth 2 times daily (with meals), Disp: , Rfl:     Allergies: No Known Allergies      REVIEW OF SYSTEMS:  CONSTITUTIONAL:NEGATIVE for fever, chills, night sweats, change in weight  INTEGUMENTARY/SKIN: NEGATIVE for worrisome rashes, moles or lesions  MUSCULOSKELETAL:See HPI above  NEURO: NEGATIVE for weakness, dizziness or paresthesias  CARDIOVASCULAR: negative for chest pain, shortness of breath    PHYSICAL EXAM:  /80   Pulse 96   Ht 1.77 m (5' 9.69\")   Wt 78.5 kg (173 lb)   BMI 25.04 kg/m     GENERAL APPEARANCE: healthy, alert, no distress  SKIN: no suspicious lesions or rashes  NEURO: Normal strength and tone, mentation intact and speech normal  PSYCH:  " mentation appears normal and affect normal  RESPIRATORY: No increased work of breathing.    BILATERAL LOWER EXTREMITIES:  Gait: using walker for support, favors the right.    Intact sensation deep peroneal nerve, superficial peroneal nerve, med/lat tibial nerve, sural nerve, saphenous nerve  Intact EHL, EDL, TA, FHL, GS, quadriceps hamstrings and hip flexors  Toes warm and well perfused, brisk capillary refill. Palpable 2+ dp pulses.  Bilateral calf soft and nttp or squeeze.  Edema: 1+, Pitting    right  KNEE EXAM:    Incision: dried skin glue. There are a couple small blisters at incision.  There are significant blisters of the entire leg from the knee to the ankle filled with clear yellow fluid. A couple have ruptured.  There is diffuse ecchymosis. No significant erythema.  ROM: 5 extension to 60 flexion  Effusion: large  Tender: diffuse      X-RAY:  no new images today.      Impression: Case Epstein is a 61 year old male status post Total knee arthroplasty, right knee, increased postoperative swelling with blisters, 1 weeks out from surgery.      Plan:   * suspect patient hadn't been elevating since surgery, thus developed significant swelling upon discharge at home and swelling blisters.  * he needs to lay back and get his foot above the knee and knee above the heart. Upon discussion, it was clear that he was not elevating. We demonstrated appropriate elevation.  * recommend strict elevation as much as possible, day and night.  * wound cares for blisters, discussed today.  * continue DVT prophylaxis for a total of 4 weeks postoperative  * continue with knee range of motion exercises, focusing on extension  * wean off all pain medications as tolerated    * reassess 2 days for wound check, dressing change, sooner if needed. He can leave dressing alone as long as remains clean and dry. If blisters rupture and dressing saturates, recommend changing dressing or return to clinic for dressing change.  * continue  with Physical Therapy, home exercise program.  * all questions addressed and answered prior to discharge from clinic today to patient's satisfaction.  * patient will need longterm prophylactic antibiotics use with dental procedures or other invasive procedures (2 g amoxicilin or 450mg ([id number]) clindamycin) 1 hour prior to dental procedures.    * KOOS Jr/Promis Knee score: NOT to be done at next visit; NOT completed during todays visit; to be completed at 12 weeks and 12 months postoperative.      Dennis Randle M.D., M.S.  Dept. of Orthopaedic Surgery  [facility]            Again, thank you for allowing me to participate in the care of your patient.        Sincerely,        Dennis Randle MD

## 2021-05-19 ENCOUNTER — OFFICE VISIT (OUTPATIENT)
Dept: ORTHOPEDICS | Facility: CLINIC | Age: 61
End: 2021-05-19
Payer: COMMERCIAL

## 2021-05-19 VITALS
WEIGHT: 173 LBS | HEIGHT: 70 IN | BODY MASS INDEX: 24.77 KG/M2 | DIASTOLIC BLOOD PRESSURE: 81 MMHG | SYSTOLIC BLOOD PRESSURE: 155 MMHG | HEART RATE: 112 BPM

## 2021-05-19 DIAGNOSIS — Z96.651 STATUS POST RIGHT KNEE REPLACEMENT: Primary | ICD-10-CM

## 2021-05-19 DIAGNOSIS — T81.89XD SWELLING OF SURGICAL SITE, SUBSEQUENT ENCOUNTER: ICD-10-CM

## 2021-05-19 DIAGNOSIS — R60.9 SWELLING OF SURGICAL SITE, SUBSEQUENT ENCOUNTER: ICD-10-CM

## 2021-05-19 PROCEDURE — 99024 POSTOP FOLLOW-UP VISIT: CPT | Performed by: ORTHOPAEDIC SURGERY

## 2021-05-19 RX ORDER — CEPHALEXIN 500 MG/1
500 CAPSULE ORAL 2 TIMES DAILY
Qty: 14 CAPSULE | Refills: 0 | Status: SHIPPED | OUTPATIENT
Start: 2021-05-19 | End: 2021-05-26

## 2021-05-19 ASSESSMENT — PAIN SCALES - GENERAL: PAINLEVEL: NO PAIN (0)

## 2021-05-19 ASSESSMENT — MIFFLIN-ST. JEOR: SCORE: 1591.05

## 2021-05-19 NOTE — LETTER
5/19/2021         RE: Case Epstein  [address]  [place] [address]        Dear Colleague,    Thank you for referring your patient, Case Epstein, to the [facility] [place]. Please see a copy of my visit note below.    Chief Complaint   Patient presents with     Right Knee - Total Joint Post-op     DOS 5/11/12. Patient is 9 days s/p. Patient presents to clinic using a walker as an aid. Patient notes his knee is doing pretty good. He denies any pain today. He has be wrapping his leg with an ACE wrap.               SURGERY: Total knee arthroplasty, right knee ([facility])  DATE OF SURGERY: 5/11/2021      HISTORY OF PRESENT ILLNESS: Case Epstein is a 61 year old male seen for postoperative evaluation of right total knee arthroplasty. It has been 9 days since surgery. Returns today for wound and swelling recheck. We saw him 2 days ago for postoperative swelling and blisters and dressed these with betadine adaptec and compression wrapping. He returns today for followup visit and wound check. Pain 0/10. Goes back to Physical Therapy tomorrow. He's been elevating now and thinks its getting better.       He was discharged on 5/12/2021, but then presented to the ED on 5/13/2021 with swelling and swelling blisters. These were dressed and started on a 7 day course of keflex four times daily. He hadn't been elevating. He saw his therapist on 5/13/2021 and was told to go to the ED.      Denies fevers chills night sweats. Continues to take aspirin for anti-coagulation for deep vein thrombosis prophylaxis. Use of pain medications has been tylenol. Has gone to Physical Therapy x1 session. Concerns today include: swelling.    Pain 0/10.    Past medical history:   Past Medical History:   Diagnosis Date     High cholesterol      OA (OSTEOARTHRITIS) OF KNEE - right 9/27/2011     Patient Active Problem List    Diagnosis Date Noted     Aftercare following right knee joint  replacement surgery 05/13/2021     Priority: Medium     Localized edema 05/13/2021     Priority: Medium     Pain in joint, ankle and foot, left 01/08/2018     Priority: Medium     CARDIOVASCULAR SCREENING; LDL GOAL LESS THAN 100 08/16/2017     Priority: Medium     Advanced directives, counseling/discussion 07/03/2015     Priority: Medium     Advance Care Planning 7/7/2015: ACP Review and Resources Provided:  Reviewed chart for advance care plan.  Case Epstein has no plan or code status on file. Discussed available resources and provided with information. Confirmed code status reflects current choices pending further ACP discussions.  Confirmed/documented legally designated decision maker(s).  Added by Noemi Sales             Vitamin D deficiency disease 06/26/2013     Priority: Medium     OA (OSTEOARTHRITIS) OF KNEE - right 09/27/2011     Priority: Medium       Past Surgical History:   Past Surgical History:   Procedure Laterality Date     ARTHRODESIS ANKLE Left 11/20/2017    Procedure: ARTHRODESIS ANKLE;  LEFT ANKLE FUSION (C-ARM);  Surgeon: Shaggy Linn MD;  Location: [facility] PALATE/UVULA SURGERY UNLISTED  age 2 and in 1986    2 procedures for palate/nasal issues     COLONOSCOPY         Medications:   Current Outpatient Medications:      acetaminophen (TYLENOL) 500 MG tablet, Take 500-1,000 mg by mouth, Disp: , Rfl:      aspirin (ASA) 325 MG tablet, , Disp: , Rfl:      cephALEXin (KEFLEX) 500 MG capsule, Take 500 mg by mouth, Disp: , Rfl:      ibuprofen (ADVIL/MOTRIN) 200 MG tablet, Take 200 mg by mouth every 4 hours as needed for mild pain, Disp: , Rfl:      naproxen sodium 220 MG capsule, Take 220 mg by mouth 2 times daily (with meals), Disp: , Rfl:      oxyCODONE (ROXICODONE) 5 MG tablet, Take 5-10 mg by mouth, Disp: , Rfl:     Allergies: No Known Allergies      REVIEW OF SYSTEMS:  CONSTITUTIONAL:NEGATIVE for fever, chills, night sweats, change in weight  INTEGUMENTARY/SKIN: NEGATIVE  "for worrisome rashes, moles or lesions  MUSCULOSKELETAL:See HPI above  NEURO: NEGATIVE for weakness, dizziness or paresthesias  CARDIOVASCULAR: negative for chest pain, shortness of breath    PHYSICAL EXAM:  BP (!) 155/81   Pulse 112   Ht 1.77 m (5' 9.69\")   Wt 78.5 kg (173 lb)   BMI 25.04 kg/m     GENERAL APPEARANCE: healthy, alert, no distress  SKIN: no suspicious lesions or rashes  NEURO: Normal strength and tone, mentation intact and speech normal  PSYCH:  mentation appears normal and affect normal  RESPIRATORY: No increased work of breathing.    BILATERAL LOWER EXTREMITIES:  Gait: using walker for support, favors the right.    Intact sensation deep peroneal nerve, superficial peroneal nerve, med/lat tibial nerve, sural nerve, saphenous nerve  Intact EHL, EDL, TA, FHL, GS, quadriceps hamstrings and hip flexors  Toes warm and well perfused, brisk capillary refill. Palpable 2+ dp pulses.  Bilateral calf soft and nttp or squeeze.  Edema: 1+, Pitting    right  KNEE EXAM:    Incision: dried skin glue. There are a couple small blisters at incision.  There are significant blisters of the entire leg from the knee to the ankle filled with clear yellow fluid. A couple have ruptured.  There is diffuse ecchymosis. No significant erythema.  ROM: 5 extension to 70 flexion  Effusion: moderate   Tender: diffuse      X-RAY:  no new images today.      Impression: Case Epstein is a 61 year old male status post Total knee arthroplasty, right knee, increased postoperative swelling with blisters, 1 weeks out from surgery.      Plan:   * swelling is improved, that is good news. Keep up the elevation.  * he needs to continue lay back and get his foot above the knee and knee above the heart.   * recommend strict elevation as much as possible, day and night.  * wound cares for blisters, discussed again today.  * continue DVT prophylaxis for a total of 4 weeks postoperative  * continue with knee range of motion exercises, focusing " on extension  * wean off all pain medications as tolerated    * reassess on 5/24/2021 for wound check and dressing change, sooner if needed. He can leave dressing alone as long as remains clean and dry. If blisters rupture and dressing saturates, recommend changing dressing or return to clinic for dressing change.  * continue with Physical Therapy, home exercise program.  * all questions addressed and answered prior to discharge from clinic today to patient's satisfaction.  * patient will need longterm prophylactic antibiotics use with dental procedures or other invasive procedures (2 g amoxicilin or 450mg ([id number]) clindamycin) 1 hour prior to dental procedures.    * KOOS Jr/Promis Knee score: NOT to be done at next visit; NOT completed during todays visit; to be completed at 12 weeks and 12 months postoperative.    * xrays, 3 views right knee.    Dennis Randle M.D., M.S.  Dept. of Orthopaedic Surgery  [facility]            Again, thank you for allowing me to participate in the care of your patient.        Sincerely,        Dennis Randle MD

## 2021-05-19 NOTE — PROGRESS NOTES
Chief Complaint   Patient presents with     Right Knee - Total Joint Post-op     DOS 5/11/12. Patient is 9 days s/p. Patient presents to clinic using a walker as an aid. Patient notes his knee is doing pretty good. He denies any pain today. He has be wrapping his leg with an ACE wrap.               SURGERY: Total knee arthroplasty, right knee ([facility])  DATE OF SURGERY: 5/11/2021      HISTORY OF PRESENT ILLNESS: Case Epstein is a 61 year old male seen for postoperative evaluation of right total knee arthroplasty. It has been 9 days since surgery. Returns today for wound and swelling recheck. We saw him 2 days ago for postoperative swelling and blisters and dressed these with betadine adaptec and compression wrapping. He returns today for followup visit and wound check. Pain 0/10. Goes back to Physical Therapy tomorrow. He's been elevating now and thinks its getting better.       He was discharged on 5/12/2021, but then presented to the ED on 5/13/2021 with swelling and swelling blisters. These were dressed and started on a 7 day course of keflex four times daily. He hadn't been elevating. He saw his therapist on 5/13/2021 and was told to go to the ED.      Denies fevers chills night sweats. Continues to take aspirin for anti-coagulation for deep vein thrombosis prophylaxis. Use of pain medications has been tylenol. Has gone to Physical Therapy x1 session. Concerns today include: swelling.    Pain 0/10.    Past medical history:   Past Medical History:   Diagnosis Date     High cholesterol      OA (OSTEOARTHRITIS) OF KNEE - right 9/27/2011     Patient Active Problem List    Diagnosis Date Noted     Aftercare following right knee joint replacement surgery 05/13/2021     Priority: Medium     Localized edema 05/13/2021     Priority: Medium     Pain in joint, ankle and foot, left 01/08/2018     Priority: Medium     CARDIOVASCULAR SCREENING; LDL GOAL LESS THAN 100 08/16/2017     Priority: Medium      "Advanced directives, counseling/discussion 07/03/2015     Priority: Medium     Advance Care Planning 7/7/2015: ACP Review and Resources Provided:  Reviewed chart for advance care plan.  Case Epstein has no plan or code status on file. Discussed available resources and provided with information. Confirmed code status reflects current choices pending further ACP discussions.  Confirmed/documented legally designated decision maker(s).  Added by Noemi Sales             Vitamin D deficiency disease 06/26/2013     Priority: Medium     OA (OSTEOARTHRITIS) OF KNEE - right 09/27/2011     Priority: Medium       Past Surgical History:   Past Surgical History:   Procedure Laterality Date     ARTHRODESIS ANKLE Left 11/20/2017    Procedure: ARTHRODESIS ANKLE;  LEFT ANKLE FUSION (C-ARM);  Surgeon: Shaggy Linn MD;  Location: [facility] PALATE/UVULA SURGERY UNLISTED  age 2 and in 1986    2 procedures for palate/nasal issues     COLONOSCOPY         Medications:   Current Outpatient Medications:      acetaminophen (TYLENOL) 500 MG tablet, Take 500-1,000 mg by mouth, Disp: , Rfl:      aspirin (ASA) 325 MG tablet, , Disp: , Rfl:      cephALEXin (KEFLEX) 500 MG capsule, Take 500 mg by mouth, Disp: , Rfl:      ibuprofen (ADVIL/MOTRIN) 200 MG tablet, Take 200 mg by mouth every 4 hours as needed for mild pain, Disp: , Rfl:      naproxen sodium 220 MG capsule, Take 220 mg by mouth 2 times daily (with meals), Disp: , Rfl:      oxyCODONE (ROXICODONE) 5 MG tablet, Take 5-10 mg by mouth, Disp: , Rfl:     Allergies: No Known Allergies      REVIEW OF SYSTEMS:  CONSTITUTIONAL:NEGATIVE for fever, chills, night sweats, change in weight  INTEGUMENTARY/SKIN: NEGATIVE for worrisome rashes, moles or lesions  MUSCULOSKELETAL:See HPI above  NEURO: NEGATIVE for weakness, dizziness or paresthesias  CARDIOVASCULAR: negative for chest pain, shortness of breath    PHYSICAL EXAM:  BP (!) 155/81   Pulse 112   Ht 1.77 m (5' 9.69\")   Wt " 78.5 kg (173 lb)   BMI 25.04 kg/m     GENERAL APPEARANCE: healthy, alert, no distress  SKIN: no suspicious lesions or rashes  NEURO: Normal strength and tone, mentation intact and speech normal  PSYCH:  mentation appears normal and affect normal  RESPIRATORY: No increased work of breathing.    BILATERAL LOWER EXTREMITIES:  Gait: using walker for support, favors the right.    Intact sensation deep peroneal nerve, superficial peroneal nerve, med/lat tibial nerve, sural nerve, saphenous nerve  Intact EHL, EDL, TA, FHL, GS, quadriceps hamstrings and hip flexors  Toes warm and well perfused, brisk capillary refill. Palpable 2+ dp pulses.  Bilateral calf soft and nttp or squeeze.  Edema: 1+, Pitting    right  KNEE EXAM:    Incision: dried skin glue. There are a couple small blisters at incision.  There are significant blisters of the entire leg from the knee to the ankle filled with clear yellow fluid. A couple have ruptured.  There is diffuse ecchymosis. No significant erythema.  ROM: 5 extension to 70 flexion  Effusion: moderate   Tender: diffuse      X-RAY:  no new images today.      Impression: Case Epstein is a 61 year old male status post Total knee arthroplasty, right knee, increased postoperative swelling with blisters, 1 weeks out from surgery.      Plan:   * swelling is improved, that is good news. Keep up the elevation.  * he needs to continue lay back and get his foot above the knee and knee above the heart.   * recommend strict elevation as much as possible, day and night.  * wound cares for blisters, discussed again today.  * continue DVT prophylaxis for a total of 4 weeks postoperative  * continue with knee range of motion exercises, focusing on extension  * wean off all pain medications as tolerated    * reassess on 5/24/2021 for wound check and dressing change, sooner if needed. He can leave dressing alone as long as remains clean and dry. If blisters rupture and dressing saturates, recommend  changing dressing or return to clinic for dressing change.  * continue with Physical Therapy, home exercise program.  * all questions addressed and answered prior to discharge from clinic today to patient's satisfaction.  * patient will need longterm prophylactic antibiotics use with dental procedures or other invasive procedures (2 g amoxicilin or 450mg ([id number]) clindamycin) 1 hour prior to dental procedures.    * KOOS Jr/Promis Knee score: NOT to be done at next visit; NOT completed during todays visit; to be completed at 12 weeks and 12 months postoperative.    * xrays, 3 views right knee.    Dennis Randle M.D., M.S.  Dept. of Orthopaedic Surgery  [facility]

## 2021-05-20 ENCOUNTER — THERAPY VISIT (OUTPATIENT)
Dept: PHYSICAL THERAPY | Facility: CLINIC | Age: 61
End: 2021-05-20
Payer: COMMERCIAL

## 2021-05-20 DIAGNOSIS — Z96.651 AFTERCARE FOLLOWING RIGHT KNEE JOINT REPLACEMENT SURGERY: ICD-10-CM

## 2021-05-20 DIAGNOSIS — Z47.1 AFTERCARE FOLLOWING RIGHT KNEE JOINT REPLACEMENT SURGERY: ICD-10-CM

## 2021-05-20 DIAGNOSIS — R60.0 LOCALIZED EDEMA: ICD-10-CM

## 2021-05-20 PROCEDURE — 97530 THERAPEUTIC ACTIVITIES: CPT | Mod: GP | Performed by: PHYSICAL THERAPIST

## 2021-05-20 PROCEDURE — 97110 THERAPEUTIC EXERCISES: CPT | Mod: GP | Performed by: PHYSICAL THERAPIST

## 2021-05-20 PROCEDURE — 97016 VASOPNEUMATIC DEVICE THERAPY: CPT | Mod: GP | Performed by: PHYSICAL THERAPIST

## 2021-05-24 ENCOUNTER — OFFICE VISIT (OUTPATIENT)
Dept: ORTHOPEDICS | Facility: CLINIC | Age: 61
End: 2021-05-24
Payer: COMMERCIAL

## 2021-05-24 ENCOUNTER — ANCILLARY PROCEDURE (OUTPATIENT)
Dept: GENERAL RADIOLOGY | Facility: CLINIC | Age: 61
End: 2021-05-24
Attending: PHYSICIAN ASSISTANT
Payer: COMMERCIAL

## 2021-05-24 VITALS
DIASTOLIC BLOOD PRESSURE: 91 MMHG | WEIGHT: 164 LBS | HEIGHT: 70 IN | BODY MASS INDEX: 23.48 KG/M2 | SYSTOLIC BLOOD PRESSURE: 134 MMHG

## 2021-05-24 DIAGNOSIS — Z96.651 S/P TOTAL KNEE ARTHROPLASTY, RIGHT: Primary | ICD-10-CM

## 2021-05-24 DIAGNOSIS — Z96.651 S/P TOTAL KNEE ARTHROPLASTY, RIGHT: ICD-10-CM

## 2021-05-24 PROCEDURE — 99024 POSTOP FOLLOW-UP VISIT: CPT | Performed by: ORTHOPAEDIC SURGERY

## 2021-05-24 PROCEDURE — 73562 X-RAY EXAM OF KNEE 3: CPT | Mod: RT | Performed by: RADIOLOGY

## 2021-05-24 ASSESSMENT — MIFFLIN-ST. JEOR: SCORE: 1550.23

## 2021-05-24 ASSESSMENT — PAIN SCALES - GENERAL: PAINLEVEL: NO PAIN (0)

## 2021-05-24 NOTE — LETTER
5/24/2021         RE: Case Epstein  [address] [facility]  [place] [address]        Dear Colleague,    Thank you for referring your patient, Case Epstein, to the [facility] [place]. Please see a copy of my visit note below.    Chief Complaint   Patient presents with     Right Knee - Surgical Followup     Right total knee arthroplasty. DOS: 5/11/21. 2 weeks post-op. Swelling is down, blisters have decompressed. No pain. Starting to get around a little better on the knee. No prolems         SURGERY: Total knee arthroplasty, right knee ([facility])  DATE OF SURGERY: 5/11/2021      HISTORY OF PRESENT ILLNESS: Case Epstein is a 61 year old male seen for postoperative evaluation of right total knee arthroplasty. It has been 13 days since surgery. Returns today for wound and swelling recheck. We saw him twice last week for postoperative swelling and blisters and dressed these with betadine adaptec and compression wrapping. He returns today for followup visit and wound check. Pain 0/10. Going to Physical Therapy.  He's been elevating now and thinks its getting a lot better.       He was discharged on 5/12/2021, but then presented to the ED on 5/13/2021 with swelling and swelling blisters. These were dressed and started on a 7 day course of keflex four times daily. He hadn't been elevating. He saw his therapist on 5/13/2021 and was told to go to the ED.      Denies fevers chills night sweats. Continues to take aspirin for anti-coagulation for deep vein thrombosis prophylaxis. Use of pain medications has been tylenol. Has gone to Physical Therapy x2 session. Concerns today include: none    Pain 0/10.    Past medical history:   Past Medical History:   Diagnosis Date     High cholesterol      OA (OSTEOARTHRITIS) OF KNEE - right 9/27/2011     Patient Active Problem List    Diagnosis Date Noted     Aftercare following right knee joint replacement surgery 05/13/2021     Priority:  Medium     Localized edema 05/13/2021     Priority: Medium     Pain in joint, ankle and foot, left 01/08/2018     Priority: Medium     CARDIOVASCULAR SCREENING; LDL GOAL LESS THAN 100 08/16/2017     Priority: Medium     Advanced directives, counseling/discussion 07/03/2015     Priority: Medium     Advance Care Planning 7/7/2015: ACP Review and Resources Provided:  Reviewed chart for advance care plan.  Case Epstein has no plan or code status on file. Discussed available resources and provided with information. Confirmed code status reflects current choices pending further ACP discussions.  Confirmed/documented legally designated decision maker(s).  Added by Noemi Sales             Vitamin D deficiency disease 06/26/2013     Priority: Medium     OA (OSTEOARTHRITIS) OF KNEE - right 09/27/2011     Priority: Medium       Past Surgical History:   Past Surgical History:   Procedure Laterality Date     ARTHRODESIS ANKLE Left 11/20/2017    Procedure: ARTHRODESIS ANKLE;  LEFT ANKLE FUSION (C-ARM);  Surgeon: Shaggy Linn MD;  Location: [facility] PALATE/UVULA SURGERY UNLISTED  age 2 and in 1986    2 procedures for palate/nasal issues     COLONOSCOPY         Medications:   Current Outpatient Medications:      acetaminophen (TYLENOL) 500 MG tablet, Take 500-1,000 mg by mouth, Disp: , Rfl:      aspirin (ASA) 325 MG tablet, , Disp: , Rfl:      cephALEXin (KEFLEX) 500 MG capsule, Take 1 capsule (500 mg) by mouth 2 times daily for 7 days, Disp: 14 capsule, Rfl: 0     ibuprofen (ADVIL/MOTRIN) 200 MG tablet, Take 200 mg by mouth every 4 hours as needed for mild pain, Disp: , Rfl:      naproxen sodium 220 MG capsule, Take 220 mg by mouth 2 times daily (with meals), Disp: , Rfl:      oxyCODONE (ROXICODONE) 5 MG tablet, Take 5-10 mg by mouth, Disp: , Rfl:     Allergies: No Known Allergies      REVIEW OF SYSTEMS:  CONSTITUTIONAL:NEGATIVE for fever, chills, night sweats, change in weight  INTEGUMENTARY/SKIN:  "NEGATIVE for worrisome rashes, moles or lesions  MUSCULOSKELETAL:See HPI above  NEURO: NEGATIVE for weakness, dizziness or paresthesias  CARDIOVASCULAR: negative for chest pain, shortness of breath    PHYSICAL EXAM:  BP (!) 134/91   Ht 1.77 m (5' 9.69\")   Wt 74.4 kg (164 lb)   BMI 23.74 kg/m     GENERAL APPEARANCE: healthy, alert, no distress  SKIN: no suspicious lesions or rashes  NEURO: Normal strength and tone, mentation intact and speech normal  PSYCH:  mentation appears normal and affect normal  RESPIRATORY: No increased work of breathing.    BILATERAL LOWER EXTREMITIES:  Gait: using walker for support, favors the right.    Intact sensation deep peroneal nerve, superficial peroneal nerve, med/lat tibial nerve, sural nerve, saphenous nerve  Intact EHL, EDL, TA, FHL, GS, quadriceps hamstrings and hip flexors  Toes warm and well perfused, brisk capillary refill. Palpable 2+ dp pulses.  Bilateral calf soft and nttp or squeeze.  Edema: trace. Notable skin wrinkling.    right  KNEE EXAM:    Incision: dried skin glue. Notable eschars of the right lower extremity. All blisters appear to have popped and dried or drying up. There is no active blister leakage.  There is diffuse , resolving ecchymosis. No significant erythema.  ROM: 5 extension to 90 flexion  Effusion: moderate   Tender: diffuse      X-RAY:  no new images today.      Impression: Case Epstein is a 61 year old male status post Total knee arthroplasty, right knee, improving postoperative swelling with blisters, 2 weeks out from surgery.      Plan:   * swelling is improved, that is good news. Keep up the elevation. Has made significant difference since last week.  * blisters appear to be resolving as well.   * recommend strict elevation as much as possible, day and night.  * compression wrapping.  * wound cares for blisters, discussed again today.  * continue DVT prophylaxis for a total of 4 weeks postoperative  * continue with knee range of motion " exercises, focusing on extension  * wean off all pain medications as tolerated    * reassess 4 weeks, sooner if needed.  * xrays right knee at next visit, 2 views.  * continue with Physical Therapy, home exercise program.  * all questions addressed and answered prior to discharge from clinic today to patient's satisfaction.  * patient will need longterm prophylactic antibiotics use with dental procedures or other invasive procedures (2 g amoxicilin or 450mg ([id number]) clindamycin) 1 hour prior to dental procedures.    * KOOS Jr/Promis Knee score: NOT to be done at next visit; NOT completed during todays visit; to be completed at 12 weeks and 12 months postoperative.      Dennis Randle M.D., M.S.  Dept. of Orthopaedic Surgery  [facility]            Again, thank you for allowing me to participate in the care of your patient.        Sincerely,        Dennis Randle MD

## 2021-05-24 NOTE — PROGRESS NOTES
Chief Complaint   Patient presents with     Right Knee - Surgical Followup     Right total knee arthroplasty. DOS: 5/11/21. 2 weeks post-op. Swelling is down, blisters have decompressed. No pain. Starting to get around a little better on the knee. No prolems         SURGERY: Total knee arthroplasty, right knee ([facility])  DATE OF SURGERY: 5/11/2021      HISTORY OF PRESENT ILLNESS: Case Epstein is a 61 year old male seen for postoperative evaluation of right total knee arthroplasty. It has been 13 days since surgery. Returns today for wound and swelling recheck. We saw him twice last week for postoperative swelling and blisters and dressed these with betadine adaptec and compression wrapping. He returns today for followup visit and wound check. Pain 0/10. Going to Physical Therapy.  He's been elevating now and thinks its getting a lot better.       He was discharged on 5/12/2021, but then presented to the ED on 5/13/2021 with swelling and swelling blisters. These were dressed and started on a 7 day course of keflex four times daily. He hadn't been elevating. He saw his therapist on 5/13/2021 and was told to go to the ED.      Denies fevers chills night sweats. Continues to take aspirin for anti-coagulation for deep vein thrombosis prophylaxis. Use of pain medications has been tylenol. Has gone to Physical Therapy x2 session. Concerns today include: none    Pain 0/10.    Past medical history:   Past Medical History:   Diagnosis Date     High cholesterol      OA (OSTEOARTHRITIS) OF KNEE - right 9/27/2011     Patient Active Problem List    Diagnosis Date Noted     Aftercare following right knee joint replacement surgery 05/13/2021     Priority: Medium     Localized edema 05/13/2021     Priority: Medium     Pain in joint, ankle and foot, left 01/08/2018     Priority: Medium     CARDIOVASCULAR SCREENING; LDL GOAL LESS THAN 100 08/16/2017     Priority: Medium     Advanced directives, counseling/discussion  "07/03/2015     Priority: Medium     Advance Care Planning 7/7/2015: ACP Review and Resources Provided:  Reviewed chart for advance care plan.  Case Epstein has no plan or code status on file. Discussed available resources and provided with information. Confirmed code status reflects current choices pending further ACP discussions.  Confirmed/documented legally designated decision maker(s).  Added by Noemi Sales             Vitamin D deficiency disease 06/26/2013     Priority: Medium     OA (OSTEOARTHRITIS) OF KNEE - right 09/27/2011     Priority: Medium       Past Surgical History:   Past Surgical History:   Procedure Laterality Date     ARTHRODESIS ANKLE Left 11/20/2017    Procedure: ARTHRODESIS ANKLE;  LEFT ANKLE FUSION (C-ARM);  Surgeon: Shaggy Linn MD;  Location: [facility] PALATE/UVULA SURGERY UNLISTED  age 2 and in 1986    2 procedures for palate/nasal issues     COLONOSCOPY         Medications:   Current Outpatient Medications:      acetaminophen (TYLENOL) 500 MG tablet, Take 500-1,000 mg by mouth, Disp: , Rfl:      aspirin (ASA) 325 MG tablet, , Disp: , Rfl:      cephALEXin (KEFLEX) 500 MG capsule, Take 1 capsule (500 mg) by mouth 2 times daily for 7 days, Disp: 14 capsule, Rfl: 0     ibuprofen (ADVIL/MOTRIN) 200 MG tablet, Take 200 mg by mouth every 4 hours as needed for mild pain, Disp: , Rfl:      naproxen sodium 220 MG capsule, Take 220 mg by mouth 2 times daily (with meals), Disp: , Rfl:      oxyCODONE (ROXICODONE) 5 MG tablet, Take 5-10 mg by mouth, Disp: , Rfl:     Allergies: No Known Allergies      REVIEW OF SYSTEMS:  CONSTITUTIONAL:NEGATIVE for fever, chills, night sweats, change in weight  INTEGUMENTARY/SKIN: NEGATIVE for worrisome rashes, moles or lesions  MUSCULOSKELETAL:See HPI above  NEURO: NEGATIVE for weakness, dizziness or paresthesias  CARDIOVASCULAR: negative for chest pain, shortness of breath    PHYSICAL EXAM:  BP (!) 134/91   Ht 1.77 m (5' 9.69\")   Wt 74.4 kg " (164 lb)   BMI 23.74 kg/m     GENERAL APPEARANCE: healthy, alert, no distress  SKIN: no suspicious lesions or rashes  NEURO: Normal strength and tone, mentation intact and speech normal  PSYCH:  mentation appears normal and affect normal  RESPIRATORY: No increased work of breathing.    BILATERAL LOWER EXTREMITIES:  Gait: using walker for support, favors the right.    Intact sensation deep peroneal nerve, superficial peroneal nerve, med/lat tibial nerve, sural nerve, saphenous nerve  Intact EHL, EDL, TA, FHL, GS, quadriceps hamstrings and hip flexors  Toes warm and well perfused, brisk capillary refill. Palpable 2+ dp pulses.  Bilateral calf soft and nttp or squeeze.  Edema: trace. Notable skin wrinkling.    right  KNEE EXAM:    Incision: dried skin glue. Notable eschars of the right lower extremity. All blisters appear to have popped and dried or drying up. There is no active blister leakage.  There is diffuse , resolving ecchymosis. No significant erythema.  ROM: 5 extension to 90 flexion  Effusion: moderate   Tender: diffuse      X-RAY:  no new images today.      Impression: Case Epstein is a 61 year old male status post Total knee arthroplasty, right knee, improving postoperative swelling with blisters, 2 weeks out from surgery.      Plan:   * swelling is improved, that is good news. Keep up the elevation. Has made significant difference since last week.  * blisters appear to be resolving as well.   * recommend strict elevation as much as possible, day and night.  * compression wrapping.  * wound cares for blisters, discussed again today.  * continue DVT prophylaxis for a total of 4 weeks postoperative  * continue with knee range of motion exercises, focusing on extension  * wean off all pain medications as tolerated    * reassess 4 weeks, sooner if needed.  * xrays right knee at next visit, 2 views.  * continue with Physical Therapy, home exercise program.  * all questions addressed and answered prior to  discharge from clinic today to patient's satisfaction.  * patient will need longterm prophylactic antibiotics use with dental procedures or other invasive procedures (2 g amoxicilin or 450mg ([id number]) clindamycin) 1 hour prior to dental procedures.    * KOOS Jr/Promis Knee score: NOT to be done at next visit; NOT completed during todays visit; to be completed at 12 weeks and 12 months postoperative.      Dennis Randle M.D., M.S.  Dept. of Orthopaedic Surgery  [facility]

## 2021-05-25 ENCOUNTER — THERAPY VISIT (OUTPATIENT)
Dept: PHYSICAL THERAPY | Facility: CLINIC | Age: 61
End: 2021-05-25
Payer: COMMERCIAL

## 2021-05-25 DIAGNOSIS — R60.0 LOCALIZED EDEMA: ICD-10-CM

## 2021-05-25 DIAGNOSIS — Z47.1 AFTERCARE FOLLOWING RIGHT KNEE JOINT REPLACEMENT SURGERY: ICD-10-CM

## 2021-05-25 DIAGNOSIS — Z96.651 AFTERCARE FOLLOWING RIGHT KNEE JOINT REPLACEMENT SURGERY: ICD-10-CM

## 2021-05-25 PROCEDURE — 97530 THERAPEUTIC ACTIVITIES: CPT | Mod: GP | Performed by: SPECIALIST/TECHNOLOGIST

## 2021-05-25 PROCEDURE — 97110 THERAPEUTIC EXERCISES: CPT | Mod: GP | Performed by: SPECIALIST/TECHNOLOGIST

## 2021-05-25 PROCEDURE — 97016 VASOPNEUMATIC DEVICE THERAPY: CPT | Mod: GP | Performed by: SPECIALIST/TECHNOLOGIST

## 2021-05-27 ENCOUNTER — THERAPY VISIT (OUTPATIENT)
Dept: PHYSICAL THERAPY | Facility: CLINIC | Age: 61
End: 2021-05-27
Payer: COMMERCIAL

## 2021-05-27 DIAGNOSIS — R60.0 LOCALIZED EDEMA: ICD-10-CM

## 2021-05-27 DIAGNOSIS — Z47.1 AFTERCARE FOLLOWING RIGHT KNEE JOINT REPLACEMENT SURGERY: ICD-10-CM

## 2021-05-27 DIAGNOSIS — Z96.651 AFTERCARE FOLLOWING RIGHT KNEE JOINT REPLACEMENT SURGERY: ICD-10-CM

## 2021-05-27 PROCEDURE — 97110 THERAPEUTIC EXERCISES: CPT | Mod: GP | Performed by: SPECIALIST/TECHNOLOGIST

## 2021-05-27 PROCEDURE — 97016 VASOPNEUMATIC DEVICE THERAPY: CPT | Mod: GP | Performed by: SPECIALIST/TECHNOLOGIST

## 2021-05-27 PROCEDURE — 2894A PR THERAPEUTIC ACTIVITIES, EA 15 MIN: CPT | Mod: GP | Performed by: SPECIALIST/TECHNOLOGIST

## 2021-06-01 ENCOUNTER — THERAPY VISIT (OUTPATIENT)
Dept: PHYSICAL THERAPY | Facility: CLINIC | Age: 61
End: 2021-06-01
Payer: COMMERCIAL

## 2021-06-01 DIAGNOSIS — R60.0 LOCALIZED EDEMA: ICD-10-CM

## 2021-06-01 DIAGNOSIS — Z47.1 AFTERCARE FOLLOWING RIGHT KNEE JOINT REPLACEMENT SURGERY: ICD-10-CM

## 2021-06-01 DIAGNOSIS — Z96.651 AFTERCARE FOLLOWING RIGHT KNEE JOINT REPLACEMENT SURGERY: ICD-10-CM

## 2021-06-01 PROCEDURE — 97110 THERAPEUTIC EXERCISES: CPT | Mod: GP | Performed by: SPECIALIST/TECHNOLOGIST

## 2021-06-01 PROCEDURE — 97016 VASOPNEUMATIC DEVICE THERAPY: CPT | Mod: GP | Performed by: SPECIALIST/TECHNOLOGIST

## 2021-06-01 PROCEDURE — 2894A PR THERAPEUTIC ACTIVITIES, EA 15 MIN: CPT | Mod: GP | Performed by: SPECIALIST/TECHNOLOGIST

## 2021-06-03 ENCOUNTER — THERAPY VISIT (OUTPATIENT)
Dept: PHYSICAL THERAPY | Facility: CLINIC | Age: 61
End: 2021-06-03
Payer: COMMERCIAL

## 2021-06-03 DIAGNOSIS — R60.0 LOCALIZED EDEMA: ICD-10-CM

## 2021-06-03 DIAGNOSIS — Z96.651 AFTERCARE FOLLOWING RIGHT KNEE JOINT REPLACEMENT SURGERY: ICD-10-CM

## 2021-06-03 DIAGNOSIS — Z47.1 AFTERCARE FOLLOWING RIGHT KNEE JOINT REPLACEMENT SURGERY: ICD-10-CM

## 2021-06-03 PROCEDURE — 97016 VASOPNEUMATIC DEVICE THERAPY: CPT | Mod: GP | Performed by: SPECIALIST/TECHNOLOGIST

## 2021-06-03 PROCEDURE — 2894A PR THERAPEUTIC ACTIVITIES, EA 15 MIN: CPT | Mod: GP | Performed by: SPECIALIST/TECHNOLOGIST

## 2021-06-03 PROCEDURE — 97110 THERAPEUTIC EXERCISES: CPT | Mod: GP | Performed by: SPECIALIST/TECHNOLOGIST

## 2021-06-07 ENCOUNTER — THERAPY VISIT (OUTPATIENT)
Dept: PHYSICAL THERAPY | Facility: CLINIC | Age: 61
End: 2021-06-07
Payer: COMMERCIAL

## 2021-06-07 DIAGNOSIS — Z47.1 AFTERCARE FOLLOWING RIGHT KNEE JOINT REPLACEMENT SURGERY: ICD-10-CM

## 2021-06-07 DIAGNOSIS — Z96.651 AFTERCARE FOLLOWING RIGHT KNEE JOINT REPLACEMENT SURGERY: ICD-10-CM

## 2021-06-07 DIAGNOSIS — R60.0 LOCALIZED EDEMA: ICD-10-CM

## 2021-06-07 PROCEDURE — 97110 THERAPEUTIC EXERCISES: CPT | Mod: GP | Performed by: PHYSICAL THERAPIST

## 2021-06-07 NOTE — LETTER
[facility]  [address]  [place] [address]  [phone]    2021    Re: Case Epstein   :   1960  MRN:  [id number]   REFERRING PHYSICIAN:   Dennis FARIAS [facility]    Date of Initial Evaluation:  21  Visits:  Rxs Used: 7  Reason for Referral:     Aftercare following right knee joint replacement surgery  Localized edema      PROGRESS  REPORT    Progress reporting period is from 2021 to 2021.       SUBJECTIVE  Subjective changes noted by patient:  Pt reports that his R knee is feeling quite a bit better overall. He has been able to do some walking without the cane at home. His swelling has been getting quite a bit better. Continues to have the most difficulty with straightening his R knee.      Current pain level is 0/10.     Previous pain level was  5/10.   Changes in function:  Yes (See Goal flowsheet attached for changes in current functional level)  Adverse reaction to treatment or activity: None    OBJECTIVE  Changes noted in objective findings:  Yes, notable improvements in swelling, quad activation, and overall knee ROM.    KNEE: (* indicates patient's primary complaint)   PROM R PROM L AROM R AROM L MMT R MMT L   Ext   0-5      Flx   110        Quadriceps Muscle Activation Right Left   Isometric Quad Activation Good Normal   Straight Leg Raising Extensor lag of 2-3 deg No extensor lag     Gait: Pt ambulating with 2WW and step through gait; decreased terminal knee extension with heel strike on R; decreased stance time on and weight shift to R LE    ASSESSMENT/PLAN  Updated problem list and treatment plan: Diagnosis 1:  R knee pain s/p R TKA on 2021  Pain -  hot/cold therapy, manual therapy, splint/taping/bracing/orthotics, self management, education and home program  Decreased ROM/flexibility - manual therapy, therapeutic exercise and home program  Decreased joint mobility -  manual therapy, therapeutic exercise and home program  Decreased strength - therapeutic exercise, therapeutic activities and home program  Impaired balance - neuro re-education, therapeutic activities and home program  Inflammation - cold therapy and self management/home program  Edema - vasopneumatics, electric stimulation, cold therapy, cryocuff and self management/home program  Impaired gait - gait training, assistive devices and home program  Impaired muscle performance - neuro re-education and home program  Decreased function - therapeutic activities and home program    STG/LTGs have been met or progress has been made towards goals:  Yes (See Goal flow sheet completed today.)  Assessment of Progress: The patient's condition is improving.  Self Management Plans:  Patient has been instructed in a home treatment program.  Patient  has been instructed in self management of symptoms.  I have re-evaluated this patient and find that the nature, scope, duration and intensity of the therapy is appropriate for the medical condition of the patient.  Case continues to require the following intervention to meet STG and LTG's:  PT    Recommendations:  This patient would benefit from continued therapy.     Frequency:  1 X week, once daily  Duration:  for 6-8 weeks      Thank you for your referral.          INQUIRIES  Therapist: Carlos Montes DPT   [facility] [place] [facility]  [address]  [place] [address]  Phone: [phone]  Fax: [phone]

## 2021-06-07 NOTE — PROGRESS NOTES
PROGRESS  REPORT    Progress reporting period is from 5/13/2021 to 6/7/2021.       SUBJECTIVE  Subjective changes noted by patient:  Pt reports that his R knee is feeling quite a bit better overall. He has been able to do some walking without the cane at home. His swelling has been getting quite a bit better. Continues to have the most difficulty with straightening his R knee.      Current pain level is 0/10.     Previous pain level was  5/10.   Changes in function:  Yes (See Goal flowsheet attached for changes in current functional level)  Adverse reaction to treatment or activity: None    OBJECTIVE  Changes noted in objective findings:  Yes, notable improvements in swelling, quad activation, and overall knee ROM.    KNEE: (* indicates patient's primary complaint)   PROM R PROM L AROM R AROM L MMT R MMT L   Ext   0-5      Flx   110        Quadriceps Muscle Activation Right Left   Isometric Quad Activation Good Normal   Straight Leg Raising Extensor lag of 2-3 deg No extensor lag     Gait: Pt ambulating with 2WW and step through gait; decreased terminal knee extension with heel strike on R; decreased stance time on and weight shift to R LE    ASSESSMENT/PLAN  Updated problem list and treatment plan: Diagnosis 1:  R knee pain s/p R TKA on 5/11/2021  Pain -  hot/cold therapy, manual therapy, splint/taping/bracing/orthotics, self management, education and home program  Decreased ROM/flexibility - manual therapy, therapeutic exercise and home program  Decreased joint mobility - manual therapy, therapeutic exercise and home program  Decreased strength - therapeutic exercise, therapeutic activities and home program  Impaired balance - neuro re-education, therapeutic activities and home program  Inflammation - cold therapy and self management/home program  Edema - vasopneumatics, electric stimulation, cold therapy, cryocuff and self management/home program  Impaired gait - gait training, assistive devices and home  program  Impaired muscle performance - neuro re-education and home program  Decreased function - therapeutic activities and home program    STG/LTGs have been met or progress has been made towards goals:  Yes (See Goal flow sheet completed today.)  Assessment of Progress: The patient's condition is improving.  Self Management Plans:  Patient has been instructed in a home treatment program.  Patient  has been instructed in self management of symptoms.  I have re-evaluated this patient and find that the nature, scope, duration and intensity of the therapy is appropriate for the medical condition of the patient.  Case continues to require the following intervention to meet STG and LTG's:  PT    Recommendations:  This patient would benefit from continued therapy.     Frequency:  1 X week, once daily  Duration:  for 6-8 weeks      Please refer to the daily flowsheet for treatment today, total treatment time and time spent performing 1:1 timed codes.

## 2021-06-15 ENCOUNTER — THERAPY VISIT (OUTPATIENT)
Dept: PHYSICAL THERAPY | Facility: CLINIC | Age: 61
End: 2021-06-15
Payer: COMMERCIAL

## 2021-06-15 DIAGNOSIS — Z47.1 AFTERCARE FOLLOWING RIGHT KNEE JOINT REPLACEMENT SURGERY: ICD-10-CM

## 2021-06-15 DIAGNOSIS — R60.0 LOCALIZED EDEMA: ICD-10-CM

## 2021-06-15 DIAGNOSIS — Z96.651 AFTERCARE FOLLOWING RIGHT KNEE JOINT REPLACEMENT SURGERY: ICD-10-CM

## 2021-06-15 PROCEDURE — 97530 THERAPEUTIC ACTIVITIES: CPT | Mod: GP | Performed by: SPECIALIST/TECHNOLOGIST

## 2021-06-15 PROCEDURE — 97016 VASOPNEUMATIC DEVICE THERAPY: CPT | Mod: GP | Performed by: SPECIALIST/TECHNOLOGIST

## 2021-06-15 PROCEDURE — 97110 THERAPEUTIC EXERCISES: CPT | Mod: GP | Performed by: SPECIALIST/TECHNOLOGIST

## 2021-06-21 ENCOUNTER — OFFICE VISIT (OUTPATIENT)
Dept: ORTHOPEDICS | Facility: CLINIC | Age: 61
End: 2021-06-21
Payer: COMMERCIAL

## 2021-06-21 ENCOUNTER — ANCILLARY PROCEDURE (OUTPATIENT)
Dept: GENERAL RADIOLOGY | Facility: CLINIC | Age: 61
End: 2021-06-21
Attending: PHYSICIAN ASSISTANT
Payer: COMMERCIAL

## 2021-06-21 VITALS
WEIGHT: 164 LBS | BODY MASS INDEX: 23.48 KG/M2 | SYSTOLIC BLOOD PRESSURE: 142 MMHG | DIASTOLIC BLOOD PRESSURE: 80 MMHG | HEIGHT: 70 IN

## 2021-06-21 DIAGNOSIS — Z96.651 S/P TOTAL KNEE ARTHROPLASTY, RIGHT: ICD-10-CM

## 2021-06-21 DIAGNOSIS — Z96.651 S/P TOTAL KNEE ARTHROPLASTY, RIGHT: Primary | ICD-10-CM

## 2021-06-21 PROCEDURE — 99024 POSTOP FOLLOW-UP VISIT: CPT | Performed by: ORTHOPAEDIC SURGERY

## 2021-06-21 PROCEDURE — 73560 X-RAY EXAM OF KNEE 1 OR 2: CPT | Mod: RT | Performed by: RADIOLOGY

## 2021-06-21 ASSESSMENT — MIFFLIN-ST. JEOR: SCORE: 1550.23

## 2021-06-21 ASSESSMENT — PAIN SCALES - GENERAL: PAINLEVEL: NO PAIN (0)

## 2021-06-21 NOTE — PROGRESS NOTES
Chief Complaint   Patient presents with     Right Knee - Surgical Followup     Right total knee arthroplasty. DOS: 5/11/21. 6 weeks post-op. Doing well. Waking with a cane, going to Physical Therapy. Scar and blisters are scabbed over and healing well.          SURGERY: Total knee arthroplasty, right knee ([facility])  DATE OF SURGERY: 5/11/2021      HISTORY OF PRESENT ILLNESS: Case Epstein is a 61 year old male seen for postoperative evaluation of right total knee arthroplasty. It has been 6 weeks since surgery. Returns today doing well. No pain. Swelling improving. Wounds and blisters have healed up well, still some scabs but no wounds. Walking with a cane. Going to Physical Therapy.      Pain 0/10.    Past medical history:   Past Medical History:   Diagnosis Date     High cholesterol      OA (OSTEOARTHRITIS) OF KNEE - right 9/27/2011     Patient Active Problem List    Diagnosis Date Noted     Aftercare following right knee joint replacement surgery 05/13/2021     Priority: Medium     Localized edema 05/13/2021     Priority: Medium     Pain in joint, ankle and foot, left 01/08/2018     Priority: Medium     CARDIOVASCULAR SCREENING; LDL GOAL LESS THAN 100 08/16/2017     Priority: Medium     Advanced directives, counseling/discussion 07/03/2015     Priority: Medium     Advance Care Planning 7/7/2015: ACP Review and Resources Provided:  Reviewed chart for advance care plan.  Case Epstein has no plan or code status on file. Discussed available resources and provided with information. Confirmed code status reflects current choices pending further ACP discussions.  Confirmed/documented legally designated decision maker(s).  Added by Noemi Sales             Vitamin D deficiency disease 06/26/2013     Priority: Medium     OA (OSTEOARTHRITIS) OF KNEE - right 09/27/2011     Priority: Medium       Past Surgical History:   Past Surgical History:   Procedure Laterality Date     ARTHRODESIS ANKLE Left  "11/20/2017    Procedure: ARTHRODESIS ANKLE;  LEFT ANKLE FUSION (C-ARM);  Surgeon: Shaggy Linn MD;  Location: [facility] PALATE/UVULA SURGERY UNLISTED  age 2 and in 1986    2 procedures for palate/nasal issues     COLONOSCOPY         Medications:   Current Outpatient Medications:      acetaminophen (TYLENOL) 500 MG tablet, Take 500-1,000 mg by mouth, Disp: , Rfl:      aspirin (ASA) 325 MG tablet, , Disp: , Rfl:      ibuprofen (ADVIL/MOTRIN) 200 MG tablet, Take 200 mg by mouth every 4 hours as needed for mild pain, Disp: , Rfl:      naproxen sodium 220 MG capsule, Take 220 mg by mouth 2 times daily (with meals), Disp: , Rfl:      oxyCODONE (ROXICODONE) 5 MG tablet, Take 5-10 mg by mouth, Disp: , Rfl:     Allergies: No Known Allergies      REVIEW OF SYSTEMS:  CONSTITUTIONAL:NEGATIVE for fever, chills, night sweats, change in weight  INTEGUMENTARY/SKIN: NEGATIVE for worrisome rashes, moles or lesions  MUSCULOSKELETAL:See HPI above  NEURO: NEGATIVE for weakness, dizziness or paresthesias  CARDIOVASCULAR: negative for chest pain, shortness of breath    PHYSICAL EXAM:  BP (!) 142/80   Ht 1.77 m (5' 9.69\")   Wt 74.4 kg (164 lb)   BMI 23.74 kg/m     GENERAL APPEARANCE: healthy, alert, no distress  SKIN: no suspicious lesions or rashes  NEURO: Normal strength and tone, mentation intact and speech normal  PSYCH:  mentation appears normal and affect normal  RESPIRATORY: No increased work of breathing.    BILATERAL LOWER EXTREMITIES:  Gait: using cane left hand for support, favors the right.      Right lower extremity:  Intact sensation deep peroneal nerve, superficial peroneal nerve, med/lat tibial nerve, sural nerve, saphenous nerve  Intact EHL, EDL, TA, FHL, GS, quadriceps hamstrings and hip flexors  Toes warm and well perfused, brisk capillary refill. Palpable 2+ dp pulses.  calf soft and nttp or squeeze.  Edema: none   Notable quadriceps atrophy    right  KNEE EXAM:    Incision: dried skin glue. Several " scabs / scars from healed blisters.  There is no ecchymosis and no erythema.  ROM: 2 extension to 120 flexion  Effusion: trace  Tender: none      X-RAY:  2 views right knee 6/21/2021 reviewed. Total knee arthroplasty components in place without evidence of loosening or failure.      Impression: Case Epstein is a 61 year old male status post Total knee arthroplasty, right knee, doing well, 6 weeks out from surgery.      Plan:   * swelling is improved, that is good news. Keep up the elevation.   * blisters appear to be healed. Scabs will fall off on own, dont advise picking them off.  * compression wrapping.  * continue with knee range of motion exercises, focusing on extension  * wean off all pain medications as tolerated    * reassess 6 weeks, sooner if needed.  * xrays right knee at next visit, 2 views.  * continue with Physical Therapy, home exercise program.  * all questions addressed and answered prior to discharge from clinic today to patient's satisfaction.  * patient will need longterm prophylactic antibiotics use with dental procedures or other invasive procedures (2 g amoxicilin or 450mg ([id number]) clindamycin) 1 hour prior to dental procedures.    * KOOS Jr/Promis Knee score: IS to be done at next visit (12 weeks); NOT completed during todays visit; to be completed at 12 weeks and 12 months postoperative.      Dennis Randle M.D., M.S.  Dept. of Orthopaedic Surgery  [facility]

## 2021-06-21 NOTE — LETTER
6/21/2021         RE: Case Epstein  [address] [facility]  [place] [address]        Dear Colleague,    Thank you for referring your patient, Case Epstein, to the [facility] [place]. Please see a copy of my visit note below.    Chief Complaint   Patient presents with     Right Knee - Surgical Followup     Right total knee arthroplasty. DOS: 5/11/21. 6 weeks post-op. Doing well. Waking with a cane, going to Physical Therapy. Scar and blisters are scabbed over and healing well.          SURGERY: Total knee arthroplasty, right knee ([facility])  DATE OF SURGERY: 5/11/2021      HISTORY OF PRESENT ILLNESS: Case Epstein is a 61 year old male seen for postoperative evaluation of right total knee arthroplasty. It has been 6 weeks since surgery. Returns today doing well. No pain. Swelling improving. Wounds and blisters have healed up well, still some scabs but no wounds. Walking with a cane. Going to Physical Therapy.      Pain 0/10.    Past medical history:   Past Medical History:   Diagnosis Date     High cholesterol      OA (OSTEOARTHRITIS) OF KNEE - right 9/27/2011     Patient Active Problem List    Diagnosis Date Noted     Aftercare following right knee joint replacement surgery 05/13/2021     Priority: Medium     Localized edema 05/13/2021     Priority: Medium     Pain in joint, ankle and foot, left 01/08/2018     Priority: Medium     CARDIOVASCULAR SCREENING; LDL GOAL LESS THAN 100 08/16/2017     Priority: Medium     Advanced directives, counseling/discussion 07/03/2015     Priority: Medium     Advance Care Planning 7/7/2015: ACP Review and Resources Provided:  Reviewed chart for advance care plan.  Case Epstein has no plan or code status on file. Discussed available resources and provided with information. Confirmed code status reflects current choices pending further ACP discussions.  Confirmed/documented legally designated decision maker(s).  Added by Noemi PILLAI  "Sales             Vitamin D deficiency disease 06/26/2013     Priority: Medium     OA (OSTEOARTHRITIS) OF KNEE - right 09/27/2011     Priority: Medium       Past Surgical History:   Past Surgical History:   Procedure Laterality Date     ARTHRODESIS ANKLE Left 11/20/2017    Procedure: ARTHRODESIS ANKLE;  LEFT ANKLE FUSION (C[facility]ARM);  Surgeon: Shaggy Linn MD;  Location: [facility] PALATE/UVULA SURGERY UNLISTED  age 2 and in 1986    2 procedures for palate/nasal issues     COLONOSCOPY         Medications:   Current Outpatient Medications:      acetaminophen (TYLENOL) 500 MG tablet, Take 500-1,000 mg by mouth, Disp: , Rfl:      aspirin (ASA) 325 MG tablet, , Disp: , Rfl:      ibuprofen (ADVIL/MOTRIN) 200 MG tablet, Take 200 mg by mouth every 4 hours as needed for mild pain, Disp: , Rfl:      naproxen sodium 220 MG capsule, Take 220 mg by mouth 2 times daily (with meals), Disp: , Rfl:      oxyCODONE (ROXICODONE) 5 MG tablet, Take 5-10 mg by mouth, Disp: , Rfl:     Allergies: No Known Allergies      REVIEW OF SYSTEMS:  CONSTITUTIONAL:NEGATIVE for fever, chills, night sweats, change in weight  INTEGUMENTARY/SKIN: NEGATIVE for worrisome rashes, moles or lesions  MUSCULOSKELETAL:See HPI above  NEURO: NEGATIVE for weakness, dizziness or paresthesias  CARDIOVASCULAR: negative for chest pain, shortness of breath    PHYSICAL EXAM:  BP (!) 142/80   Ht 1.77 m (5' 9.69\")   Wt 74.4 kg (164 lb)   BMI 23.74 kg/m     GENERAL APPEARANCE: healthy, alert, no distress  SKIN: no suspicious lesions or rashes  NEURO: Normal strength and tone, mentation intact and speech normal  PSYCH:  mentation appears normal and affect normal  RESPIRATORY: No increased work of breathing.    BILATERAL LOWER EXTREMITIES:  Gait: using cane left hand for support, favors the right.      Right lower extremity:  Intact sensation deep peroneal nerve, superficial peroneal nerve, med/lat tibial nerve, sural nerve, saphenous nerve  Intact EHL, EDL, " TA, FHL, GS, quadriceps hamstrings and hip flexors  Toes warm and well perfused, brisk capillary refill. Palpable 2+ dp pulses.  calf soft and nttp or squeeze.  Edema: none   Notable quadriceps atrophy    right  KNEE EXAM:    Incision: dried skin glue. Several scabs / scars from healed blisters.  There is no ecchymosis and no erythema.  ROM: 2 extension to 120 flexion  Effusion: trace  Tender: none      X-RAY:  2 views right knee 6/21/2021 reviewed. Total knee arthroplasty components in place without evidence of loosening or failure.      Impression: Case Epstein is a 61 year old male status post Total knee arthroplasty, right knee, doing well, 6 weeks out from surgery.      Plan:   * swelling is improved, that is good news. Keep up the elevation.   * blisters appear to be healed. Scabs will fall off on own, dont advise picking them off.  * compression wrapping.  * continue with knee range of motion exercises, focusing on extension  * wean off all pain medications as tolerated    * reassess 6 weeks, sooner if needed.  * xrays right knee at next visit, 2 views.  * continue with Physical Therapy, home exercise program.  * all questions addressed and answered prior to discharge from clinic today to patient's satisfaction.  * patient will need longterm prophylactic antibiotics use with dental procedures or other invasive procedures (2 g amoxicilin or 450mg ([id number]) clindamycin) 1 hour prior to dental procedures.    * KOOS Jr/Promis Knee score: IS to be done at next visit (12 weeks); NOT completed during todays visit; to be completed at 12 weeks and 12 months postoperative.      Dennis Randle M.D., M.S.  Dept. of Orthopaedic Surgery  [facility]            Again, thank you for allowing me to participate in the care of your patient.        Sincerely,        Dennis Randle MD

## 2021-06-24 ENCOUNTER — THERAPY VISIT (OUTPATIENT)
Dept: PHYSICAL THERAPY | Facility: CLINIC | Age: 61
End: 2021-06-24
Payer: COMMERCIAL

## 2021-06-24 DIAGNOSIS — Z47.1 AFTERCARE FOLLOWING RIGHT KNEE JOINT REPLACEMENT SURGERY: ICD-10-CM

## 2021-06-24 DIAGNOSIS — Z96.651 AFTERCARE FOLLOWING RIGHT KNEE JOINT REPLACEMENT SURGERY: ICD-10-CM

## 2021-06-24 DIAGNOSIS — R60.0 LOCALIZED EDEMA: ICD-10-CM

## 2021-06-24 PROCEDURE — 97110 THERAPEUTIC EXERCISES: CPT | Mod: GP | Performed by: PHYSICAL THERAPIST

## 2021-06-24 PROCEDURE — 97530 THERAPEUTIC ACTIVITIES: CPT | Mod: GP | Performed by: PHYSICAL THERAPIST

## 2021-07-01 ENCOUNTER — THERAPY VISIT (OUTPATIENT)
Dept: PHYSICAL THERAPY | Facility: CLINIC | Age: 61
End: 2021-07-01
Payer: COMMERCIAL

## 2021-07-01 DIAGNOSIS — Z96.651 AFTERCARE FOLLOWING RIGHT KNEE JOINT REPLACEMENT SURGERY: ICD-10-CM

## 2021-07-01 DIAGNOSIS — Z47.1 AFTERCARE FOLLOWING RIGHT KNEE JOINT REPLACEMENT SURGERY: ICD-10-CM

## 2021-07-01 DIAGNOSIS — R60.0 LOCALIZED EDEMA: ICD-10-CM

## 2021-07-01 PROCEDURE — 97530 THERAPEUTIC ACTIVITIES: CPT | Mod: GP | Performed by: PHYSICAL THERAPIST

## 2021-07-01 PROCEDURE — 97110 THERAPEUTIC EXERCISES: CPT | Mod: GP | Performed by: PHYSICAL THERAPIST

## 2021-07-01 PROCEDURE — 97112 NEUROMUSCULAR REEDUCATION: CPT | Mod: GP | Performed by: PHYSICAL THERAPIST

## 2021-07-05 ENCOUNTER — THERAPY VISIT (OUTPATIENT)
Dept: PHYSICAL THERAPY | Facility: CLINIC | Age: 61
End: 2021-07-05
Payer: COMMERCIAL

## 2021-07-05 DIAGNOSIS — Z47.1 AFTERCARE FOLLOWING RIGHT KNEE JOINT REPLACEMENT SURGERY: ICD-10-CM

## 2021-07-05 DIAGNOSIS — R60.0 LOCALIZED EDEMA: ICD-10-CM

## 2021-07-05 DIAGNOSIS — Z96.651 AFTERCARE FOLLOWING RIGHT KNEE JOINT REPLACEMENT SURGERY: ICD-10-CM

## 2021-07-05 PROCEDURE — 97110 THERAPEUTIC EXERCISES: CPT | Mod: GP | Performed by: PHYSICAL THERAPIST

## 2021-07-05 PROCEDURE — 97112 NEUROMUSCULAR REEDUCATION: CPT | Mod: GP | Performed by: PHYSICAL THERAPIST

## 2021-07-05 NOTE — PROGRESS NOTES
PROGRESS  REPORT    Progress reporting period is from 6/7/2021 to 7/5/2021.       SUBJECTIVE  Subjective changes noted by patient:  Pt reports that his R knee has been doing well overall. He continues to have difficulty with balance at home. He has tried to do some walking without the cane, which continues to be difficult.       Current pain level is 0/10.   Changes in function:  Yes (See Goal flowsheet attached for changes in current functional level)  Adverse reaction to treatment or activity: None    OBJECTIVE  Changes noted in objective findings:  Yes, improved R knee ROM    KNEE: (* indicates patient's primary complaint)   PROM R PROM L AROM R AROM L MMT R MMT L   Ext   0-2 3-0 5- 5-   Flx   117 125 4+ 4+     Quadriceps Muscle Activation Right Left   Isometric Quad Activation Good Normal   Straight Leg Raising No extensor lag No extensor lag       SL Balance:   R: ~2-3 sec   L: ~2-3 sec    ASSESSMENT/PLAN  Updated problem list and treatment plan: Diagnosis 1:  R knee pain s/p TKA  Pain -  hot/cold therapy, US, electric stimulation, manual therapy, splint/taping/bracing/orthotics, self management, education and home program  Decreased ROM/flexibility - manual therapy, therapeutic exercise and home program  Decreased joint mobility - manual therapy, therapeutic exercise and home program  Decreased strength - therapeutic exercise, therapeutic activities and home program  Impaired balance - neuro re-education, therapeutic activities and home program  Impaired gait - gait training and home program  Decreased function - therapeutic activities and home program    STG/LTGs have been met or progress has been made towards goals:  Yes (See Goal flow sheet completed today.)  Assessment of Progress: The patient's condition is improving.  Self Management Plans:  Patient has been instructed in a home treatment program.  Patient  has been instructed in self management of symptoms.  I have re-evaluated this patient and find  that the nature, scope, duration and intensity of the therapy is appropriate for the medical condition of the patient.  Case continues to require the following intervention to meet STG and LTG's:  PT    Recommendations:  This patient would benefit from continued therapy.     Frequency:  1 X week, once daily  Duration:  for 4 weeks        Please refer to the daily flowsheet for treatment today, total treatment time and time spent performing 1:1 timed codes.

## 2021-07-14 ENCOUNTER — IMMUNIZATION (OUTPATIENT)
Dept: NURSING | Facility: CLINIC | Age: 61
End: 2021-07-14
Payer: COMMERCIAL

## 2021-07-14 PROCEDURE — 0001A PR COVID VAC PFIZER DIL RECON 30 MCG/0.3 ML IM: CPT

## 2021-07-14 PROCEDURE — 91300 PR COVID VAC PFIZER DIL RECON 30 MCG/0.3 ML IM: CPT

## 2021-07-19 ENCOUNTER — THERAPY VISIT (OUTPATIENT)
Dept: PHYSICAL THERAPY | Facility: CLINIC | Age: 61
End: 2021-07-19
Payer: COMMERCIAL

## 2021-07-19 DIAGNOSIS — R60.0 LOCALIZED EDEMA: ICD-10-CM

## 2021-07-19 DIAGNOSIS — Z96.651 AFTERCARE FOLLOWING RIGHT KNEE JOINT REPLACEMENT SURGERY: ICD-10-CM

## 2021-07-19 DIAGNOSIS — Z47.1 AFTERCARE FOLLOWING RIGHT KNEE JOINT REPLACEMENT SURGERY: ICD-10-CM

## 2021-07-19 PROCEDURE — 97110 THERAPEUTIC EXERCISES: CPT | Mod: GP | Performed by: PHYSICAL THERAPIST

## 2021-07-19 PROCEDURE — 97530 THERAPEUTIC ACTIVITIES: CPT | Mod: GP | Performed by: PHYSICAL THERAPIST

## 2021-07-19 PROCEDURE — 97112 NEUROMUSCULAR REEDUCATION: CPT | Mod: GP | Performed by: PHYSICAL THERAPIST

## 2021-07-26 ENCOUNTER — THERAPY VISIT (OUTPATIENT)
Dept: PHYSICAL THERAPY | Facility: CLINIC | Age: 61
End: 2021-07-26
Payer: COMMERCIAL

## 2021-07-26 DIAGNOSIS — R60.0 LOCALIZED EDEMA: ICD-10-CM

## 2021-07-26 DIAGNOSIS — Z96.651 AFTERCARE FOLLOWING RIGHT KNEE JOINT REPLACEMENT SURGERY: ICD-10-CM

## 2021-07-26 DIAGNOSIS — Z47.1 AFTERCARE FOLLOWING RIGHT KNEE JOINT REPLACEMENT SURGERY: ICD-10-CM

## 2021-07-26 PROCEDURE — 97110 THERAPEUTIC EXERCISES: CPT | Mod: GP | Performed by: PHYSICAL THERAPIST

## 2021-07-26 PROCEDURE — 97112 NEUROMUSCULAR REEDUCATION: CPT | Mod: GP | Performed by: PHYSICAL THERAPIST

## 2021-08-01 ASSESSMENT — KOOS JR
GOING UP OR DOWN STAIRS: MILD
KOOS JR SCORING: 76.33
BENDING TO THE FLOOR TO PICK UP OBJECT: MILD
HOW SEVERE IS YOUR KNEE STIFFNESS AFTER FIRST WAKING IN MORNING: MILD
STANDING UPRIGHT: MILD

## 2021-08-02 ENCOUNTER — ANCILLARY PROCEDURE (OUTPATIENT)
Dept: GENERAL RADIOLOGY | Facility: CLINIC | Age: 61
End: 2021-08-02
Attending: ORTHOPAEDIC SURGERY
Payer: COMMERCIAL

## 2021-08-02 ENCOUNTER — OFFICE VISIT (OUTPATIENT)
Dept: ORTHOPEDICS | Facility: CLINIC | Age: 61
End: 2021-08-02
Payer: COMMERCIAL

## 2021-08-02 VITALS
DIASTOLIC BLOOD PRESSURE: 81 MMHG | BODY MASS INDEX: 24.67 KG/M2 | HEART RATE: 98 BPM | HEIGHT: 70 IN | WEIGHT: 172.3 LBS | SYSTOLIC BLOOD PRESSURE: 124 MMHG

## 2021-08-02 DIAGNOSIS — Z96.651 S/P TOTAL KNEE ARTHROPLASTY, RIGHT: Primary | ICD-10-CM

## 2021-08-02 DIAGNOSIS — Z96.651 S/P TOTAL KNEE ARTHROPLASTY, RIGHT: ICD-10-CM

## 2021-08-02 PROCEDURE — 99024 POSTOP FOLLOW-UP VISIT: CPT | Performed by: ORTHOPAEDIC SURGERY

## 2021-08-02 PROCEDURE — 73560 X-RAY EXAM OF KNEE 1 OR 2: CPT | Mod: RT | Performed by: RADIOLOGY

## 2021-08-02 RX ORDER — OXYCODONE AND ACETAMINOPHEN 5; 325 MG/1; MG/1
1 TABLET ORAL EVERY 6 HOURS PRN
COMMUNITY
End: 2022-05-11

## 2021-08-02 ASSESSMENT — PAIN SCALES - GENERAL: PAINLEVEL: NO PAIN (0)

## 2021-08-02 ASSESSMENT — MIFFLIN-ST. JEOR: SCORE: 1587.88

## 2021-08-02 NOTE — PROGRESS NOTES
Chief Complaint   Patient presents with     Right Knee - Total Joint Post-op     DOS 5/11/21, 12 wk s/p. Patient notes his knee is doing good. It is getting better. He has one more rehab session. At home he does not use a cane but when he goes out he will. He has been trying to go without it.          SURGERY: Total knee arthroplasty, right knee ([facility])  DATE OF SURGERY: 5/11/2021      HISTORY OF PRESENT ILLNESS: Case Epstein is a 61 year old male seen for postoperative evaluation of right total knee arthroplasty. It has been 12 weeks since surgery. Returns today doing well. No pain. Swelling improving. Walking with a cane when out, not when at home. Going to Physical Therapy. He is pleased.    His right hip is also feeling better, not much pain now.    He's doing more now than he's done the past 10 years.      Pain 0/10.    Past medical history:   Past Medical History:   Diagnosis Date     High cholesterol      OA (OSTEOARTHRITIS) OF KNEE - right 9/27/2011     Patient Active Problem List    Diagnosis Date Noted     Aftercare following right knee joint replacement surgery 05/13/2021     Priority: Medium     Localized edema 05/13/2021     Priority: Medium     Pain in joint, ankle and foot, left 01/08/2018     Priority: Medium     CARDIOVASCULAR SCREENING; LDL GOAL LESS THAN 100 08/16/2017     Priority: Medium     Advanced directives, counseling/discussion 07/03/2015     Priority: Medium     Advance Care Planning 7/7/2015: ACP Review and Resources Provided:  Reviewed chart for advance care plan.  Case Epstein has no plan or code status on file. Discussed available resources and provided with information. Confirmed code status reflects current choices pending further ACP discussions.  Confirmed/documented legally designated decision maker(s).  Added by Noemi Sales             Vitamin D deficiency disease 06/26/2013     Priority: Medium     OA (OSTEOARTHRITIS) OF KNEE - right 09/27/2011     " Priority: Medium       Past Surgical History:   Past Surgical History:   Procedure Laterality Date     ARTHRODESIS ANKLE Left 11/20/2017    Procedure: ARTHRODESIS ANKLE;  LEFT ANKLE FUSION (C-ARM);  Surgeon: Shaggy Linn MD;  Location: [facility] PALATE/UVULA SURGERY UNLISTED  age 2 and in 1986    2 procedures for palate/nasal issues     COLONOSCOPY         Medications:   Current Outpatient Medications:      acetaminophen (TYLENOL) 500 MG tablet, Take 500-1,000 mg by mouth, Disp: , Rfl:      aspirin (ASA) 325 MG tablet, , Disp: , Rfl:      ibuprofen (ADVIL/MOTRIN) 200 MG tablet, Take 200 mg by mouth every 4 hours as needed for mild pain, Disp: , Rfl:      naproxen sodium 220 MG capsule, Take 220 mg by mouth 2 times daily (with meals), Disp: , Rfl:      oxyCODONE (ROXICODONE) 5 MG tablet, Take 5-10 mg by mouth, Disp: , Rfl:     Allergies: No Known Allergies      REVIEW OF SYSTEMS:  CONSTITUTIONAL:NEGATIVE for fever, chills, night sweats, change in weight  INTEGUMENTARY/SKIN: NEGATIVE for worrisome rashes, moles or lesions  MUSCULOSKELETAL:See HPI above  NEURO: NEGATIVE for weakness, dizziness or paresthesias  CARDIOVASCULAR: negative for chest pain, shortness of breath    PHYSICAL EXAM:  /81   Pulse 98   Ht 1.77 m (5' 9.69\")   Wt 78.2 kg (172 lb 4.8 oz)   BMI 24.94 kg/m     GENERAL APPEARANCE: healthy, alert, no distress  SKIN: no suspicious lesions or rashes  NEURO: Normal strength and tone, mentation intact and speech normal  PSYCH:  mentation appears normal and affect normal  RESPIRATORY: No increased work of breathing.    BILATERAL LOWER EXTREMITIES:  Gait: using cane left hand for support, slight favors the right.      Right lower extremity:  Intact sensation deep peroneal nerve, superficial peroneal nerve, med/lat tibial nerve, sural nerve, saphenous nerve  Intact EHL, EDL, TA, FHL, GS, quadriceps hamstrings and hip flexors  Toes warm and well perfused, brisk capillary refill. Palpable " 2+ dp pulses.  calf soft and nttp or squeeze.  Edema: none   Notable quadriceps atrophy    right  KNEE EXAM:    Incision: healed well.  There is no ecchymosis and no erythema.  ROM: 2 extension to 120+ flexion  Effusion: trace to small.  Tender: none      X-RAY:  2 views right knee 8/2/2021  reviewed. Total knee arthroplasty components in place without evidence of loosening or failure.      Impression: Case Epstein is a 61 year old male status post Total knee arthroplasty, right knee, doing well, 12 weeks out from surgery.      Plan:   * swelling is improved, that is good news. Keep up the elevation.   * continue with knee range of motion exercises, focusing on extension  * need to work on strength, balance.    * reassess 9 months, sooner if needed.  * xrays right knee at next visit, 2 views.  * continue with Physical Therapy, home exercise program.  * all questions addressed and answered prior to discharge from clinic today to patient's satisfaction.  * patient will need longterm prophylactic antibiotics use with dental procedures or other invasive procedures (2 g amoxicilin or 450mg ([id number]) clindamycin) 1 hour prior to dental procedures.    * KOOS Jr/Promis Knee score: IS to be done at next visit (12 months); WAS completed during todays visit (12 weeks); to be completed at 12 weeks and 12 months postoperative.      Dennis Randle M.D., M.S.  Dept. of Orthopaedic Surgery  [facility]

## 2021-08-02 NOTE — LETTER
8/2/2021         RE: Case Epstein  [address]  [place] [address]        Dear Colleague,    Thank you for referring your patient, Case Epstein, to the [facility]. Please see a copy of my visit note below.    Chief Complaint   Patient presents with     Right Knee - Total Joint Post-op     DOS 5/11/21, 12 wk s/p. Patient notes his knee is doing good. It is getting better. He has one more rehab session. At home he does not use a cane but when he goes out he will. He has been trying to go without it.          SURGERY: Total knee arthroplasty, right knee ([facility])  DATE OF SURGERY: 5/11/2021      HISTORY OF PRESENT ILLNESS: Case Epstein is a 61 year old male seen for postoperative evaluation of right total knee arthroplasty. It has been 12 weeks since surgery. Returns today doing well. No pain. Swelling improving. Walking with a cane when out, not when at home. Going to Physical Therapy. He is pleased.    His right hip is also feeling better, not much pain now.    He's doing more now than he's done the past 10 years.      Pain 0/10.    Past medical history:   Past Medical History:   Diagnosis Date     High cholesterol      OA (OSTEOARTHRITIS) OF KNEE - right 9/27/2011     Patient Active Problem List    Diagnosis Date Noted     Aftercare following right knee joint replacement surgery 05/13/2021     Priority: Medium     Localized edema 05/13/2021     Priority: Medium     Pain in joint, ankle and foot, left 01/08/2018     Priority: Medium     CARDIOVASCULAR SCREENING; LDL GOAL LESS THAN 100 08/16/2017     Priority: Medium     Advanced directives, counseling/discussion 07/03/2015     Priority: Medium     Advance Care Planning 7/7/2015: ACP Review and Resources Provided:  Reviewed chart for advance care plan.  Case Epstein has no plan or code status on file. Discussed available resources and provided with information. Confirmed code status reflects  "current choices pending further ACP discussions.  Confirmed/documented legally designated decision maker(s).  Added by Noemi Sales             Vitamin D deficiency disease 06/26/2013     Priority: Medium     OA (OSTEOARTHRITIS) OF KNEE - right 09/27/2011     Priority: Medium       Past Surgical History:   Past Surgical History:   Procedure Laterality Date     ARTHRODESIS ANKLE Left 11/20/2017    Procedure: ARTHRODESIS ANKLE;  LEFT ANKLE FUSION (C-ARM);  Surgeon: Shaggy Linn MD;  Location: [facility] PALATE/UVULA SURGERY UNLISTED  age 2 and in 1986    2 procedures for palate/nasal issues     COLONOSCOPY         Medications:   Current Outpatient Medications:      acetaminophen (TYLENOL) 500 MG tablet, Take 500-1,000 mg by mouth, Disp: , Rfl:      aspirin (ASA) 325 MG tablet, , Disp: , Rfl:      ibuprofen (ADVIL/MOTRIN) 200 MG tablet, Take 200 mg by mouth every 4 hours as needed for mild pain, Disp: , Rfl:      naproxen sodium 220 MG capsule, Take 220 mg by mouth 2 times daily (with meals), Disp: , Rfl:      oxyCODONE (ROXICODONE) 5 MG tablet, Take 5-10 mg by mouth, Disp: , Rfl:     Allergies: No Known Allergies      REVIEW OF SYSTEMS:  CONSTITUTIONAL:NEGATIVE for fever, chills, night sweats, change in weight  INTEGUMENTARY/SKIN: NEGATIVE for worrisome rashes, moles or lesions  MUSCULOSKELETAL:See HPI above  NEURO: NEGATIVE for weakness, dizziness or paresthesias  CARDIOVASCULAR: negative for chest pain, shortness of breath    PHYSICAL EXAM:  /81   Pulse 98   Ht 1.77 m (5' 9.69\")   Wt 78.2 kg (172 lb 4.8 oz)   BMI 24.94 kg/m     GENERAL APPEARANCE: healthy, alert, no distress  SKIN: no suspicious lesions or rashes  NEURO: Normal strength and tone, mentation intact and speech normal  PSYCH:  mentation appears normal and affect normal  RESPIRATORY: No increased work of breathing.    BILATERAL LOWER EXTREMITIES:  Gait: using cane left hand for support, slight favors the right.      Right " lower extremity:  Intact sensation deep peroneal nerve, superficial peroneal nerve, med/lat tibial nerve, sural nerve, saphenous nerve  Intact EHL, EDL, TA, FHL, GS, quadriceps hamstrings and hip flexors  Toes warm and well perfused, brisk capillary refill. Palpable 2+ dp pulses.  calf soft and nttp or squeeze.  Edema: none   Notable quadriceps atrophy    right  KNEE EXAM:    Incision: healed well.  There is no ecchymosis and no erythema.  ROM: 2 extension to 120+ flexion  Effusion: trace to small.  Tender: none      X-RAY:  2 views right knee 8/2/2021  reviewed. Total knee arthroplasty components in place without evidence of loosening or failure.      Impression: Case Epstein is a 61 year old male status post Total knee arthroplasty, right knee, doing well, 12 weeks out from surgery.      Plan:   * swelling is improved, that is good news. Keep up the elevation.   * continue with knee range of motion exercises, focusing on extension  * need to work on strength, balance.    * reassess 9 months, sooner if needed.  * xrays right knee at next visit, 2 views.  * continue with Physical Therapy, home exercise program.  * all questions addressed and answered prior to discharge from clinic today to patient's satisfaction.  * patient will need longterm prophylactic antibiotics use with dental procedures or other invasive procedures (2 g amoxicilin or 450mg ([id number]) clindamycin) 1 hour prior to dental procedures.    * KOOS Jr/Promis Knee score: IS to be done at next visit (12 months); WAS completed during todays visit (12 weeks); to be completed at 12 weeks and 12 months postoperative.      Dennis Randle M.D., M.S.  Dept. of Orthopaedic Surgery  [facility]            Again, thank you for allowing me to participate in the care of your patient.        Sincerely,        Dennis Randle MD

## 2021-08-04 ENCOUNTER — IMMUNIZATION (OUTPATIENT)
Dept: NURSING | Facility: CLINIC | Age: 61
End: 2021-08-04
Attending: FAMILY MEDICINE
Payer: COMMERCIAL

## 2021-08-04 PROCEDURE — 91300 PR COVID VAC PFIZER DIL RECON 30 MCG/0.3 ML IM: CPT

## 2021-08-04 PROCEDURE — 0002A PR COVID VAC PFIZER DIL RECON 30 MCG/0.3 ML IM: CPT

## 2021-08-09 ENCOUNTER — THERAPY VISIT (OUTPATIENT)
Dept: PHYSICAL THERAPY | Facility: CLINIC | Age: 61
End: 2021-08-09
Payer: COMMERCIAL

## 2021-08-09 DIAGNOSIS — Z96.651 AFTERCARE FOLLOWING RIGHT KNEE JOINT REPLACEMENT SURGERY: ICD-10-CM

## 2021-08-09 DIAGNOSIS — Z47.1 AFTERCARE FOLLOWING RIGHT KNEE JOINT REPLACEMENT SURGERY: ICD-10-CM

## 2021-08-09 DIAGNOSIS — R60.0 LOCALIZED EDEMA: ICD-10-CM

## 2021-08-09 PROCEDURE — 97110 THERAPEUTIC EXERCISES: CPT | Mod: GP | Performed by: PHYSICAL THERAPIST

## 2021-08-09 PROCEDURE — 97112 NEUROMUSCULAR REEDUCATION: CPT | Mod: GP | Performed by: PHYSICAL THERAPIST

## 2021-08-09 ASSESSMENT — ACTIVITIES OF DAILY LIVING (ADL)
KNEEL ON THE FRONT OF YOUR KNEE: ACTIVITY IS FAIRLY DIFFICULT
AS_A_RESULT_OF_YOUR_KNEE_INJURY,_HOW_WOULD_YOU_RATE_YOUR_CURRENT_LEVEL_OF_DAILY_ACTIVITY?: NEARLY NORMAL
RISE FROM A CHAIR: ACTIVITY IS NOT DIFFICULT
LIMPING: I HAVE THE SYMPTOM BUT IT DOES NOT AFFECT MY ACTIVITY
STIFFNESS: I HAVE THE SYMPTOM BUT IT DOES NOT AFFECT MY ACTIVITY
GIVING WAY, BUCKLING OR SHIFTING OF KNEE: I DO NOT HAVE THE SYMPTOM
SIT WITH YOUR KNEE BENT: ACTIVITY IS NOT DIFFICULT
GO UP STAIRS: ACTIVITY IS MINIMALLY DIFFICULT
WALK: ACTIVITY IS NOT DIFFICULT
HOW_WOULD_YOU_RATE_THE_CURRENT_FUNCTION_OF_YOUR_KNEE_DURING_YOUR_USUAL_DAILY_ACTIVITIES_ON_A_SCALE_FROM_0_TO_100_WITH_100_BEING_YOUR_LEVEL_OF_KNEE_FUNCTION_PRIOR_TO_YOUR_INJURY_AND_0_BEING_THE_INABILITY_TO_PERFORM_ANY_OF_YOUR_USUAL_DAILY_ACTIVITIES?: 95
SQUAT: ACTIVITY IS MINIMALLY DIFFICULT
SWELLING: I DO NOT HAVE THE SYMPTOM
RAW_SCORE: 62
KNEE_ACTIVITY_OF_DAILY_LIVING_SUM: 62
KNEE_ACTIVITY_OF_DAILY_LIVING_SCORE: 88.57
GO DOWN STAIRS: ACTIVITY IS MINIMALLY DIFFICULT
HOW_WOULD_YOU_RATE_THE_OVERALL_FUNCTION_OF_YOUR_KNEE_DURING_YOUR_USUAL_DAILY_ACTIVITIES?: NEARLY NORMAL
PAIN: I DO NOT HAVE THE SYMPTOM
WEAKNESS: I DO NOT HAVE THE SYMPTOM
STAND: ACTIVITY IS NOT DIFFICULT

## 2021-09-26 ENCOUNTER — HEALTH MAINTENANCE LETTER (OUTPATIENT)
Age: 61
End: 2021-09-26

## 2022-02-09 ENCOUNTER — IMMUNIZATION (OUTPATIENT)
Dept: NURSING | Facility: CLINIC | Age: 62
End: 2022-02-09
Payer: COMMERCIAL

## 2022-02-09 PROCEDURE — 0054A COVID-19,PF,PFIZER (12+ YRS): CPT

## 2022-02-09 PROCEDURE — 91305 COVID-19,PF,PFIZER (12+ YRS): CPT

## 2022-05-07 ENCOUNTER — HEALTH MAINTENANCE LETTER (OUTPATIENT)
Age: 62
End: 2022-05-07

## 2022-05-09 NOTE — PROGRESS NOTES
Chief Complaint   Patient presents with     Right Knee - Total Joint Post-op     DOS 5/11/21, 1 yr s/p. Patient notes his knee is doing good. He has a little stiffness in his knee in the morning but once he gets going he is ok. Denies any pain in the knee. Has some hip issues and uses a cane.          SURGERY: Total knee arthroplasty, right knee ([facility])  DATE OF SURGERY: 5/11/2021      HISTORY OF PRESENT ILLNESS: Case Epstein is a 62 year old male seen for postoperative evaluation of right total knee arthroplasty. It has been 12 months since surgery. Returns today doing well. No pain. Some stiffness in the morning but once up and moving its fine.     Continues with right hip pain, issues, but better than it was. He has advanced right hip osteoarthritis. He's putting of surgery on that until next year.    He's doing more now than he's done the past 10 years.      Pain 0/10.    Past medical history:   Past Medical History:   Diagnosis Date     High cholesterol      OA (OSTEOARTHRITIS) OF KNEE - right 9/27/2011     Patient Active Problem List    Diagnosis Date Noted     Pain in joint, ankle and foot, left 01/08/2018     Priority: Medium     CARDIOVASCULAR SCREENING; LDL GOAL LESS THAN 100 08/16/2017     Priority: Medium     Advanced directives, counseling/discussion 07/03/2015     Priority: Medium     Advance Care Planning 7/7/2015: ACP Review and Resources Provided:  Reviewed chart for advance care plan.  Case Epstein has no plan or code status on file. Discussed available resources and provided with information. Confirmed code status reflects current choices pending further ACP discussions.  Confirmed/documented legally designated decision maker(s).  Added by Noemi Sales             Vitamin D deficiency disease 06/26/2013     Priority: Medium     OA (OSTEOARTHRITIS) OF KNEE - right 09/27/2011     Priority: Medium       Past Surgical History:   Past Surgical History:   Procedure  "Laterality Date     ARTHRODESIS ANKLE Left 11/20/2017    Procedure: ARTHRODESIS ANKLE;  LEFT ANKLE FUSION (C-ARM);  Surgeon: Shaggy Linn MD;  Location: [facility] PALATE/UVULA SURGERY UNLISTED  age 2 and in 1986    2 procedures for palate/nasal issues       Medications:   Current Outpatient Medications:      acetaminophen (TYLENOL) 500 MG tablet, Take 500-1,000 mg by mouth, Disp: , Rfl:      ibuprofen (ADVIL/MOTRIN) 200 MG tablet, Take 200 mg by mouth every 4 hours as needed for mild pain, Disp: , Rfl:      naproxen sodium 220 MG capsule, Take 220 mg by mouth 2 times daily (with meals), Disp: , Rfl:      oxyCODONE-acetaminophen (PERCOCET) 5-325 MG tablet, Take 1 tablet by mouth every 6 hours as needed for severe pain, Disp: , Rfl:     Allergies: No Known Allergies      REVIEW OF SYSTEMS:  CONSTITUTIONAL:NEGATIVE for fever, chills, night sweats, change in weight  INTEGUMENTARY/SKIN: NEGATIVE for worrisome rashes, moles or lesions  MUSCULOSKELETAL:See HPI above  NEURO: NEGATIVE for weakness, dizziness or paresthesias  CARDIOVASCULAR: negative for chest pain, shortness of breath    PHYSICAL EXAM:  /82   Pulse 101   Ht 1.77 m (5' 9.69\")   Wt 82.6 kg (182 lb)   BMI 26.35 kg/m     GENERAL APPEARANCE: healthy, alert, no distress  SKIN: no suspicious lesions or rashes  NEURO: Normal strength and tone, mentation intact and speech normal  PSYCH:  mentation appears normal and affect normal  RESPIRATORY: No increased work of breathing.    BILATERAL LOWER EXTREMITIES:  Gait: using cane left hand for support,  favors the right (due to the right hip he states).      Right lower extremity:  Intact sensation deep peroneal nerve, superficial peroneal nerve, med/lat tibial nerve, sural nerve, saphenous nerve  Intact EHL, EDL, TA, FHL, GS, quadriceps hamstrings and hip flexors  Toes warm and well perfused, brisk capillary refill. Palpable 2+ dp pulses.  calf soft and nttp or squeeze.  Edema: " none   Notable quadriceps atrophy    right  KNEE EXAM:    Incision: healed well.  There is no ecchymosis and no erythema.  ROM: 4 extension to 120+ flexion  Effusion: none  Tender: none  Stable to varus/valgus stress      X-RAY:  2 views right knee 5/11/2022  reviewed. Total knee arthroplasty components in place without evidence of loosening or failure.      Impression: Case Epstein is a 62 year old male status post Total knee arthroplasty, right knee, doing well, 12 months out from surgery.      Plan:   * continue with knee range of motion exercises, focusing on extension  * need to continue to work on strength, balance.    * reassess 4 years, sooner if needed.  * xrays right knee at next visit, 2 views.  * all questions addressed and answered prior to discharge from clinic today to patient's satisfaction.  * patient will need longterm prophylactic antibiotics use with dental procedures or other invasive procedures (2 g amoxicilin or 450mg ([id number]) clindamycin) 1 hour prior to dental procedures.    * KOOS Jr/Promis Knee score: WAS completed during todays visit (12 months); to be completed at 12 weeks and 12 months postoperative.    * if/when he wants to proceed with right total hip arthroplasty, I told him to come in and further discuss at that time.      Dennis Randle M.D., M.S.  Dept. of Orthopaedic Surgery  [facility]

## 2022-05-11 ENCOUNTER — OFFICE VISIT (OUTPATIENT)
Dept: ORTHOPEDICS | Facility: CLINIC | Age: 62
End: 2022-05-11
Payer: COMMERCIAL

## 2022-05-11 VITALS
HEART RATE: 101 BPM | DIASTOLIC BLOOD PRESSURE: 82 MMHG | WEIGHT: 182 LBS | BODY MASS INDEX: 26.05 KG/M2 | HEIGHT: 70 IN | SYSTOLIC BLOOD PRESSURE: 130 MMHG

## 2022-05-11 DIAGNOSIS — Z96.651 S/P TOTAL KNEE ARTHROPLASTY, RIGHT: Primary | ICD-10-CM

## 2022-05-11 PROCEDURE — 99213 OFFICE O/P EST LOW 20 MIN: CPT | Performed by: ORTHOPAEDIC SURGERY

## 2022-05-11 ASSESSMENT — KOOS JR
HOW SEVERE IS YOUR KNEE STIFFNESS AFTER FIRST WAKING IN MORNING: MILD
STRAIGHTENING KNEE FULLY: MILD
GOING UP OR DOWN STAIRS: MILD
KOOS JR SCORING: 76.33
RISING FROM SITTING: MILD

## 2022-05-11 ASSESSMENT — PAIN SCALES - GENERAL: PAINLEVEL: NO PAIN (0)

## 2022-05-11 NOTE — LETTER
5/11/2022         RE: Case Epstein  [address]  [place] [address]        Dear Colleague,    Thank you for referring your patient, Case Epstein, to the [facility] [place]. Please see a copy of my visit note below.    Chief Complaint   Patient presents with     Right Knee - Total Joint Post-op     DOS 5/11/21, 1 yr s/p. Patient notes his knee is doing good. He has a little stiffness in his knee in the morning but once he gets going he is ok. Denies any pain in the knee. Has some hip issues and uses a cane.          SURGERY: Total knee arthroplasty, right knee ([facility])  DATE OF SURGERY: 5/11/2021      HISTORY OF PRESENT ILLNESS: Case Epstein is a 62 year old male seen for postoperative evaluation of right total knee arthroplasty. It has been 12 months since surgery. Returns today doing well. No pain. Some stiffness in the morning but once up and moving its fine.     Continues with right hip pain, issues, but better than it was. He has advanced right hip osteoarthritis. He's putting of surgery on that until next year.    He's doing more now than he's done the past 10 years.      Pain 0/10.    Past medical history:   Past Medical History:   Diagnosis Date     High cholesterol      OA (OSTEOARTHRITIS) OF KNEE - right 9/27/2011     Patient Active Problem List    Diagnosis Date Noted     Pain in joint, ankle and foot, left 01/08/2018     Priority: Medium     CARDIOVASCULAR SCREENING; LDL GOAL LESS THAN 100 08/16/2017     Priority: Medium     Advanced directives, counseling/discussion 07/03/2015     Priority: Medium     Advance Care Planning 7/7/2015: ACP Review and Resources Provided:  Reviewed chart for advance care plan.  Case Epstein has no plan or code status on file. Discussed available resources and provided with information. Confirmed code status reflects current choices pending further ACP discussions.  Confirmed/documented legally designated  "decision maker(s).  Added by Noemi Sales             Vitamin D deficiency disease 06/26/2013     Priority: Medium     OA (OSTEOARTHRITIS) OF KNEE - right 09/27/2011     Priority: Medium       Past Surgical History:   Past Surgical History:   Procedure Laterality Date     ARTHRODESIS ANKLE Left 11/20/2017    Procedure: ARTHRODESIS ANKLE;  LEFT ANKLE FUSION (C-ARM);  Surgeon: Shaggy Linn MD;  Location: [facility] PALATE/UVULA SURGERY UNLISTED  age 2 and in 1986    2 procedures for palate/nasal issues       Medications:   Current Outpatient Medications:      acetaminophen (TYLENOL) 500 MG tablet, Take 500-1,000 mg by mouth, Disp: , Rfl:      ibuprofen (ADVIL/MOTRIN) 200 MG tablet, Take 200 mg by mouth every 4 hours as needed for mild pain, Disp: , Rfl:      naproxen sodium 220 MG capsule, Take 220 mg by mouth 2 times daily (with meals), Disp: , Rfl:      oxyCODONE-acetaminophen (PERCOCET) 5-325 MG tablet, Take 1 tablet by mouth every 6 hours as needed for severe pain, Disp: , Rfl:     Allergies: No Known Allergies      REVIEW OF SYSTEMS:  CONSTITUTIONAL:NEGATIVE for fever, chills, night sweats, change in weight  INTEGUMENTARY/SKIN: NEGATIVE for worrisome rashes, moles or lesions  MUSCULOSKELETAL:See HPI above  NEURO: NEGATIVE for weakness, dizziness or paresthesias  CARDIOVASCULAR: negative for chest pain, shortness of breath    PHYSICAL EXAM:  /82   Pulse 101   Ht 1.77 m (5' 9.69\")   Wt 82.6 kg (182 lb)   BMI 26.35 kg/m     GENERAL APPEARANCE: healthy, alert, no distress  SKIN: no suspicious lesions or rashes  NEURO: Normal strength and tone, mentation intact and speech normal  PSYCH:  mentation appears normal and affect normal  RESPIRATORY: No increased work of breathing.    BILATERAL LOWER EXTREMITIES:  Gait: using cane left hand for support,  favors the right (due to the right hip he states).      Right lower extremity:  Intact sensation deep peroneal nerve, " superficial peroneal nerve, med/lat tibial nerve, sural nerve, saphenous nerve  Intact EHL, EDL, TA, FHL, GS, quadriceps hamstrings and hip flexors  Toes warm and well perfused, brisk capillary refill. Palpable 2+ dp pulses.  calf soft and nttp or squeeze.  Edema: none   Notable quadriceps atrophy    right  KNEE EXAM:    Incision: healed well.  There is no ecchymosis and no erythema.  ROM: 4 extension to 120+ flexion  Effusion: none  Tender: none  Stable to varus/valgus stress      X-RAY:  2 views right knee 5/11/2022  reviewed. Total knee arthroplasty components in place without evidence of loosening or failure.      Impression: Case Epstein is a 62 year old male status post Total knee arthroplasty, right knee, doing well, 12 months out from surgery.      Plan:   * continue with knee range of motion exercises, focusing on extension  * need to continue to work on strength, balance.    * reassess 4 years, sooner if needed.  * xrays right knee at next visit, 2 views.  * all questions addressed and answered prior to discharge from clinic today to patient's satisfaction.  * patient will need longterm prophylactic antibiotics use with dental procedures or other invasive procedures (2 g amoxicilin or 450mg ([id number]) clindamycin) 1 hour prior to dental procedures.    * KOOS Jr/Promis Knee score: WAS completed during todays visit (12 months); to be completed at 12 weeks and 12 months postoperative.    * if/when he wants to proceed with right total hip arthroplasty, I told him to come in and further discuss at that time.      Dennis Randle M.D., M.S.  Dept. of Orthopaedic Surgery  [facility]            Again, thank you for allowing me to participate in the care of your patient.        Sincerely,        Dennis Randle MD

## 2022-08-03 ENCOUNTER — IMMUNIZATION (OUTPATIENT)
Dept: NURSING | Facility: CLINIC | Age: 62
End: 2022-08-03
Payer: COMMERCIAL

## 2022-08-03 DIAGNOSIS — Z23 HIGH PRIORITY FOR 2019-NCOV VACCINE: Primary | ICD-10-CM

## 2022-08-03 PROCEDURE — 91305 COVID-19,PF,PFIZER (12+ YRS): CPT

## 2022-08-03 PROCEDURE — 0054A COVID-19,PF,PFIZER (12+ YRS): CPT

## 2022-08-03 PROCEDURE — 99207 PR NO CHARGE LOS: CPT

## 2022-09-29 NOTE — PROGRESS NOTES
[facility]  [address] [place] [address]  [place] [address]  Phone: [phone]  Primary Provider: Rere Campuzano  Pre-op Performing Provider: RERE CAMPUZANO       PREOPERATIVE EVALUATION:  Today's date: 5/5/2021    Case Epstein is a 61 year old male who presents for a preoperative evaluation.    Surgical Information:  Surgery/Procedure: Right total knee replacement  Surgery Location: [facility]  Surgeon: Jer  Surgery Date: 05/11/2021  Time of Surgery: 11:00  Where patient plans to recover: At home with family  Fax number for surgical facility: [phone]    Type of Anesthesia Anticipated: General         Subjective     HPI related to upcoming procedure: 61 year old male with right knee pain / arthritis.  To have right tka.    Preop Questions 5/3/2021   1. Have you ever had a heart attack or stroke? No   2. Have you ever had surgery on your heart or blood vessels, such as a stent placement, a coronary artery bypass, or surgery on an artery in your head, neck, heart, or legs? No   3. Do you have chest pain with activity? No   4. Do you have a history of  heart failure? No   5. Do you currently have a cold, bronchitis or symptoms of other infection? No   6. Do you have a cough, shortness of breath, or wheezing? No   7. Do you or anyone in your family have previous history of blood clots? No   8. Do you or does anyone in your family have a serious bleeding problem such as prolonged bleeding following surgeries or cuts? No   9. Have you ever had problems with anemia or been told to take iron pills? No   10. Have you had any abnormal blood loss such as black, tarry or bloody stools? No   11. Have you ever had a blood transfusion? No   12. Are you willing to have a blood transfusion if it is medically needed before, during, or after your surgery? Yes   13. Have you or any of your relatives ever had problems with anesthesia? No   14. Do you have sleep apnea, excessive snoring or daytime  Has The Growth Been Previously Biopsied?: has not been previously biopsied drowsiness? No   15. Do you have any artifical heart valves or other implanted medical devices like a pacemaker, defibrillator, or continuous glucose monitor? No   16. Do you have artificial joints? No   17. Are you allergic to latex? No       Health Care Directive:  Patient does not have a Health Care Directive or Living Will:     Preoperative Review of :  Patient not on  Any controlled  substances  56}         Review of Systems  Constitutional, neuro, ENT, endocrine, pulmonary, cardiac, gastrointestinal, genitourinary, musculoskeletal, integument and psychiatric systems are negative, except as otherwise noted.    No recent illnesses    No chest pain  / breathing problems    No history of heart or lung problems    No bleeding  Or clotting problems    No history of anesthesia  problems    Patient Active Problem List    Diagnosis Date Noted     Pain in joint, ankle and foot, left 01/08/2018     Priority: Medium     CARDIOVASCULAR SCREENING; LDL GOAL LESS THAN 100 08/16/2017     Priority: Medium     Advanced directives, counseling/discussion 07/03/2015     Priority: Medium     Advance Care Planning 7/7/2015: ACP Review and Resources Provided:  Reviewed chart for advance care plan.  Case Epstein has no plan or code status on file. Discussed available resources and provided with information. Confirmed code status reflects current choices pending further ACP discussions.  Confirmed/documented legally designated decision maker(s).  Added by Noemi Sales             Vitamin D deficiency disease 06/26/2013     Priority: Medium     OA (OSTEOARTHRITIS) OF KNEE - right 09/27/2011     Priority: Medium      Past Medical History:   Diagnosis Date     High cholesterol      OA (OSTEOARTHRITIS) OF KNEE - right 9/27/2011     Past Surgical History:   Procedure Laterality Date     ARTHRODESIS ANKLE Left 11/20/2017    Procedure: ARTHRODESIS ANKLE;  LEFT ANKLE FUSION (C-ARM);  Surgeon: Shaggy Linn MD;  [facility] PALATE/UVULA SURGERY UNLISTED  age 2 and in 1986    2 procedures for palate/nasal issues     COLONOSCOPY       Current Outpatient Medications   Medication Sig Dispense Refill     ibuprofen (ADVIL/MOTRIN) 200 MG tablet Take 200 mg by mouth every 4 hours as needed for mild pain       naproxen sodium 220 MG capsule Take 220 mg by mouth 2 times daily (with meals)       aspirin 81 MG tablet Take 1 tablet by mouth daily.     note patient not taking the daily aspirin    No Known Allergies     Social History     Tobacco Use     Smoking status: Never Smoker     Smokeless tobacco: Never Used   Substance Use Topics     Alcohol use: No     Comment: occasional        History   Drug Use No         Objective     BP (!) 147/94 (BP Location: Right arm, Patient Position: Chair, Cuff Size: Adult Regular)   Pulse 83   Temp 98.5  F (36.9  C) (Oral)   Wt 78.6 kg (173 lb 6 oz)   SpO2 99%   BMI 25.10 kg/m      Physical Exam    GENERAL APPEARANCE: healthy, alert and no distress     EYES: EOMI,  PERRL     HENT: nose and mouth without ulcers or lesions and right tympanic membrane not well seen due to cerumen; left tympanic membrane normal     NECK: no adenopathy, no asymmetry, masses, or scars and thyroid normal to palpation     RESP: lungs clear to auscultation - no rales, rhonchi or wheezes     CV: regular rates and rhythm, normal S1 S2, no S3 or S4 and no murmur, click or rub     ABDOMEN:  soft, nontender, no HSM or masses and bowel sounds normal     MS: extremities normal- no gross deformities noted, no evidence of inflammation in joints, FROM in all extremities.     SKIN: no suspicious lesions or rashes     NEURO: Normal strength and tone, sensory exam grossly normal, mentation intact and speech normal     PSYCH: mentation appears normal. and affect normal/bright     LYMPHATICS: No cervical adenopathy    Recent Labs   Lab Test 04/02/21  1639   HGB 16.2         POTASSIUM 4.3   CR 0.93   A1C 5.2         Diagnostics:     See labs from earlier this year  Also we did check bmp and cbc today    No  ekg needed with no cardiac history or symptoms or risk factors      Revised Cardiac Risk Index (RCRI):  The patient has the following serious cardiovascular risks for perioperative complications:   - No serious cardiac risks = 0 points     RCRI Interpretation: 0 points: Class I (very low risk - 0.4% complication rate)        ASSESSMENT / PLAN:  (Z01.818) Preop general physical exam  (primary encounter diagnosis)  Comment: check labs today.  Overall patient in stable health.  Should be good for surgery.     Plan: Basic metabolic panel, CBC with platelets         differential             (M17.11) Primary osteoarthritis of right knee  Comment: as above  Plan: as above    Patient  Will stop all nsaids from now until after procedure.  No longer on daily aspirin.    He can take tylenol as needed for pain.      I reviewed the patient's medications, allergies, medical history, family history, and social history.    Tushar Granados MD           Signed Electronically by: Tushar Granados MD  Copy of this evaluation report is provided to requesting physician.

## 2022-10-26 ENCOUNTER — IMMUNIZATION (OUTPATIENT)
Dept: FAMILY MEDICINE | Facility: CLINIC | Age: 62
End: 2022-10-26
Payer: COMMERCIAL

## 2022-10-26 DIAGNOSIS — Z23 HIGH PRIORITY FOR 2019-NCOV VACCINE: Primary | ICD-10-CM

## 2022-10-26 PROCEDURE — 91312 COVID-19,PF,PFIZER BOOSTER BIVALENT: CPT

## 2022-10-26 PROCEDURE — 0124A COVID-19,PF,PFIZER BOOSTER BIVALENT: CPT

## 2022-10-26 PROCEDURE — 99207 PR NO CHARGE LOS: CPT

## 2023-04-17 ASSESSMENT — ENCOUNTER SYMPTOMS
DIZZINESS: 0
CONSTIPATION: 0
DYSURIA: 0
ABDOMINAL PAIN: 0
SHORTNESS OF BREATH: 0
ARTHRALGIAS: 1
COUGH: 0
WEAKNESS: 0
DIARRHEA: 0
CHILLS: 0
HEADACHES: 0
PARESTHESIAS: 0
HEMATURIA: 0
JOINT SWELLING: 0
MYALGIAS: 0
EYE PAIN: 0
HEMATOCHEZIA: 0
FEVER: 0
FREQUENCY: 0
PALPITATIONS: 0
NAUSEA: 0
HEARTBURN: 0
SORE THROAT: 0
NERVOUS/ANXIOUS: 0

## 2023-04-17 ASSESSMENT — PATIENT HEALTH QUESTIONNAIRE - PHQ9
10. IF YOU CHECKED OFF ANY PROBLEMS, HOW DIFFICULT HAVE THESE PROBLEMS MADE IT FOR YOU TO DO YOUR WORK, TAKE CARE OF THINGS AT HOME, OR GET ALONG WITH OTHER PEOPLE: NOT DIFFICULT AT ALL
SUM OF ALL RESPONSES TO PHQ QUESTIONS 1-9: 10
SUM OF ALL RESPONSES TO PHQ QUESTIONS 1-9: 10

## 2023-04-23 ENCOUNTER — HEALTH MAINTENANCE LETTER (OUTPATIENT)
Age: 63
End: 2023-04-23

## 2023-04-24 ENCOUNTER — OFFICE VISIT (OUTPATIENT)
Dept: FAMILY MEDICINE | Facility: CLINIC | Age: 63
End: 2023-04-24
Payer: COMMERCIAL

## 2023-04-24 VITALS
HEART RATE: 80 BPM | HEIGHT: 69 IN | DIASTOLIC BLOOD PRESSURE: 95 MMHG | OXYGEN SATURATION: 99 % | WEIGHT: 177.5 LBS | TEMPERATURE: 98 F | RESPIRATION RATE: 16 BRPM | SYSTOLIC BLOOD PRESSURE: 150 MMHG | BODY MASS INDEX: 26.29 KG/M2

## 2023-04-24 DIAGNOSIS — Z12.11 SCREEN FOR COLON CANCER: ICD-10-CM

## 2023-04-24 DIAGNOSIS — E78.5 HYPERLIPIDEMIA LDL GOAL <100: ICD-10-CM

## 2023-04-24 DIAGNOSIS — M15.0 PRIMARY OSTEOARTHRITIS INVOLVING MULTIPLE JOINTS: ICD-10-CM

## 2023-04-24 DIAGNOSIS — I10 HYPERTENSION GOAL BP (BLOOD PRESSURE) < 140/90: ICD-10-CM

## 2023-04-24 DIAGNOSIS — Z13.1 SCREENING FOR DIABETES MELLITUS: ICD-10-CM

## 2023-04-24 DIAGNOSIS — Z12.5 SCREENING FOR PROSTATE CANCER: ICD-10-CM

## 2023-04-24 DIAGNOSIS — Z00.00 ROUTINE GENERAL MEDICAL EXAMINATION AT A HEALTH CARE FACILITY: Primary | ICD-10-CM

## 2023-04-24 DIAGNOSIS — R53.83 FATIGUE, UNSPECIFIED TYPE: ICD-10-CM

## 2023-04-24 LAB
ALBUMIN SERPL-MCNC: 4 G/DL (ref 3.4–5)
ALP SERPL-CCNC: 72 U/L (ref 40–150)
ALT SERPL W P-5'-P-CCNC: 19 U/L (ref 0–70)
ANION GAP SERPL CALCULATED.3IONS-SCNC: 4 MMOL/L (ref 3–14)
AST SERPL W P-5'-P-CCNC: 13 U/L (ref 0–45)
BASOPHILS # BLD AUTO: 0 10E3/UL (ref 0–0.2)
BASOPHILS NFR BLD AUTO: 0 %
BILIRUB SERPL-MCNC: 0.6 MG/DL (ref 0.2–1.3)
BUN SERPL-MCNC: 15 MG/DL (ref 7–30)
CALCIUM SERPL-MCNC: 9.2 MG/DL (ref 8.5–10.1)
CHLORIDE BLD-SCNC: 107 MMOL/L (ref 94–109)
CHOLEST SERPL-MCNC: 210 MG/DL
CO2 SERPL-SCNC: 28 MMOL/L (ref 20–32)
CREAT SERPL-MCNC: 0.82 MG/DL (ref 0.66–1.25)
EOSINOPHIL # BLD AUTO: 0.1 10E3/UL (ref 0–0.7)
EOSINOPHIL NFR BLD AUTO: 1 %
ERYTHROCYTE [DISTWIDTH] IN BLOOD BY AUTOMATED COUNT: 13.9 % (ref 10–15)
FASTING STATUS PATIENT QL REPORTED: YES
GFR SERPL CREATININE-BSD FRML MDRD: >90 ML/MIN/1.73M2
GLUCOSE BLD-MCNC: 102 MG/DL (ref 70–99)
HBA1C MFR BLD: 5.3 % (ref 0–5.6)
HCT VFR BLD AUTO: 47.5 % (ref 40–53)
HDLC SERPL-MCNC: 56 MG/DL
HGB BLD-MCNC: 15.6 G/DL (ref 13.3–17.7)
LDLC SERPL CALC-MCNC: 133 MG/DL
LYMPHOCYTES # BLD AUTO: 1.6 10E3/UL (ref 0.8–5.3)
LYMPHOCYTES NFR BLD AUTO: 23 %
MCH RBC QN AUTO: 30.1 PG (ref 26.5–33)
MCHC RBC AUTO-ENTMCNC: 32.8 G/DL (ref 31.5–36.5)
MCV RBC AUTO: 92 FL (ref 78–100)
MONOCYTES # BLD AUTO: 0.7 10E3/UL (ref 0–1.3)
MONOCYTES NFR BLD AUTO: 10 %
NEUTROPHILS # BLD AUTO: 4.5 10E3/UL (ref 1.6–8.3)
NEUTROPHILS NFR BLD AUTO: 66 %
NONHDLC SERPL-MCNC: 154 MG/DL
PLATELET # BLD AUTO: 168 10E3/UL (ref 150–450)
POTASSIUM BLD-SCNC: 4.2 MMOL/L (ref 3.4–5.3)
PROT SERPL-MCNC: 7.3 G/DL (ref 6.8–8.8)
PSA SERPL-MCNC: 0.51 UG/L (ref 0–4)
RBC # BLD AUTO: 5.19 10E6/UL (ref 4.4–5.9)
SODIUM SERPL-SCNC: 139 MMOL/L (ref 133–144)
TRIGL SERPL-MCNC: 104 MG/DL
TSH SERPL DL<=0.005 MIU/L-ACNC: 3.11 MU/L (ref 0.4–4)
WBC # BLD AUTO: 6.9 10E3/UL (ref 4–11)

## 2023-04-24 PROCEDURE — 99396 PREV VISIT EST AGE 40-64: CPT | Performed by: FAMILY MEDICINE

## 2023-04-24 PROCEDURE — 80050 GENERAL HEALTH PANEL: CPT | Performed by: FAMILY MEDICINE

## 2023-04-24 PROCEDURE — 83036 HEMOGLOBIN GLYCOSYLATED A1C: CPT | Performed by: FAMILY MEDICINE

## 2023-04-24 PROCEDURE — G0103 PSA SCREENING: HCPCS | Performed by: FAMILY MEDICINE

## 2023-04-24 PROCEDURE — 80061 LIPID PANEL: CPT | Performed by: FAMILY MEDICINE

## 2023-04-24 PROCEDURE — 36415 COLL VENOUS BLD VENIPUNCTURE: CPT | Performed by: FAMILY MEDICINE

## 2023-04-24 PROCEDURE — 99213 OFFICE O/P EST LOW 20 MIN: CPT | Mod: 25 | Performed by: FAMILY MEDICINE

## 2023-04-24 RX ORDER — AMLODIPINE BESYLATE 5 MG/1
5 TABLET ORAL DAILY
Qty: 30 TABLET | Refills: 2 | Status: SHIPPED | OUTPATIENT
Start: 2023-04-24 | End: 2023-05-22

## 2023-04-24 ASSESSMENT — ENCOUNTER SYMPTOMS
JOINT SWELLING: 0
COUGH: 0
NAUSEA: 0
HEADACHES: 0
HEMATOCHEZIA: 0
CHILLS: 0
DIZZINESS: 0
EYE PAIN: 0
HEMATURIA: 0
ARTHRALGIAS: 1
PARESTHESIAS: 0
FREQUENCY: 0
WEAKNESS: 0
DIARRHEA: 0
FEVER: 0
HEARTBURN: 0
DYSURIA: 0
SHORTNESS OF BREATH: 0
MYALGIAS: 0
ABDOMINAL PAIN: 0
SORE THROAT: 0
NERVOUS/ANXIOUS: 0
PALPITATIONS: 0
CONSTIPATION: 0

## 2023-04-24 ASSESSMENT — PAIN SCALES - GENERAL: PAINLEVEL: NO PAIN (0)

## 2023-04-24 NOTE — PATIENT INSTRUCTIONS
Start amlodipine for blood pressure     Keep working on healthy diet/exercise     See us for a preop    We will send you lab results    Return the FIT stool test              Preventive Health Recommendations  Male Ages 50 - 64    Yearly exam:             See your health care provider every year in order to  o   Review health changes.   o   Discuss preventive care.    o   Review your medicines if your doctor has prescribed any.   Have a cholesterol test every 5 years, or more frequently if you are at risk for high cholesterol/heart disease.   Have a diabetes test (fasting glucose) every three years. If you are at risk for diabetes, you should have this test more often.   Have a colonoscopy at age 50, or have a yearly FIT test (stool test). These exams will check for colon cancer.    Talk with your health care provider about whether or not a prostate cancer screening test (PSA) is right for you.  You should be tested each year for STDs (sexually transmitted diseases), if you re at risk.     Shots: Get a flu shot each year. Get a tetanus shot every 10 years.     Nutrition:  Eat at least 5 servings of fruits and vegetables daily.   Eat whole-grain bread, whole-wheat pasta and brown rice instead of white grains and rice.   Get adequate Calcium and Vitamin D.     Lifestyle  Exercise for at least 150 minutes a week (30 minutes a day, 5 days a week). This will help you control your weight and prevent disease.   Limit alcohol to one drink per day.   No smoking.   Wear sunscreen to prevent skin cancer.   See your dentist every six months for an exam and cleaning.   See your eye doctor every 1 to 2 years.

## 2023-04-24 NOTE — PROGRESS NOTES
SUBJECTIVE:   CC: Case is an 62 year old who presents for preventative health visit.        View : No data to display.               Patient has been advised of split billing requirements and indicates understanding: Yes  Healthy Habits:     Getting at least 3 servings of Calcium per day:  Yes    Bi-annual eye exam:  Yes    Dental care twice a year:  NO    Sleep apnea or symptoms of sleep apnea:  None    Diet:  Regular (no restrictions)    Frequency of exercise:  2-3 days/week    Duration of exercise:  15-30 minutes    Taking medications regularly:  Yes    PHQ-2 Total Score: 4    Additional concerns today:  Yes               Today's PHQ-2 Score:       4/17/2023     9:24 AM   PHQ-2 ( 1999 [employer])   Q1: Little interest or pleasure in doing things 2   Q2: Feeling down, depressed or hopeless 2   PHQ-2 Score 4   Q1: Little interest or pleasure in doing things More than half the days   Q2: Feeling down, depressed or hopeless More than half the days   PHQ-2 Score 4           Social History     Tobacco Use     Smoking status: Never     Smokeless tobacco: Never   Vaping Use     Vaping status: Never Used   Substance Use Topics     Alcohol use: No     Comment: occasional              4/17/2023     9:23 AM   Alcohol Use   Prescreen: >3 drinks/day or >7 drinks/week? No          View : No data to display.                Last PSA:   PSA   Date Value Ref Range Status   04/02/2021 0.44 0 - 4 ug/L Final     Comment:     Assay Method:  Chemiluminescence using Siemens Vista analyzer       Reviewed orders with patient. Reviewed health maintenance and updated orders accordingly - Yes       Reviewed and updated as needed this visit by clinical staff   Tobacco  Allergies  Meds              Reviewed and updated as needed this visit by Provider                     Review of Systems   Constitutional: Negative for chills and fever.   HENT: Negative for congestion, ear pain, hearing loss and sore throat.    Eyes: Negative for pain and  "visual disturbance.   Respiratory: Negative for cough and shortness of breath.    Cardiovascular: Negative for chest pain, palpitations and peripheral edema.   Gastrointestinal: Negative for abdominal pain, constipation, diarrhea, heartburn, hematochezia and nausea.   Genitourinary: Negative for dysuria, frequency, genital sores, hematuria, impotence, penile discharge and urgency.   Musculoskeletal: Positive for arthralgias. Negative for joint swelling and myalgias.   Skin: Negative for rash.   Neurological: Negative for dizziness, weakness, headaches and paresthesias.   Psychiatric/Behavioral: Negative for mood changes. The patient is not nervous/anxious.       overall doing okay    May get right hip replacement     Had right tka two years     Retired 2020    Online chess    Trying to stay active     Pedal machine some    Stretch bands    Hip limits walking    Walk with cane     Wakes up several times at night     Alleve, ibuprofen, tylenol; helps some     No std concern        OBJECTIVE:   BP (!) 167/90 (BP Location: Right arm, Patient Position: Chair, Cuff Size: Adult Regular)   Pulse 80   Temp 98  F (36.7  C) (Temporal)   Resp 16   Ht 1.753 m (5' 9\")   Wt 80.5 kg (177 lb 8 oz)   SpO2 99%   BMI 26.21 kg/m      Physical Exam  GENERAL: healthy, alert and no distress  EYES: Eyes grossly normal to inspection, PERRL and conjunctivae and sclerae normal  HENT: ear canals and TM's normal, nose and mouth without ulcers or lesions  NECK: no adenopathy, no asymmetry, masses, or scars and thyroid normal to palpation  RESP: lungs clear to auscultation - no rales, rhonchi or wheezes  CV: regular rate and rhythm, normal S1 S2, no S3 or S4, no murmur, click or rub, no peripheral edema and peripheral pulses strong  ABDOMEN: soft, nontender, no hepatosplenomegaly, no masses and bowel sounds normal  MS: patient walks with cane, takes a while to get to exam table    SKIN: no suspicious lesions or rashes  NEURO: Normal " strength and tone, mentation intact and speech normal  PSYCH: mentation appears normal, affect normal/bright    Diagnostic Test Results:  Labs reviewed in Epic    ASSESSMENT/PLAN:   Case was seen today for physical.    Diagnoses and all orders for this visit:    Routine general medical examination at a health care facility    Screen for colon cancer  -     Fecal colorectal cancer screen FIT - Future (S+30); Future  -     Fecal colorectal cancer screen FIT - Future (S+30)    Hypertension goal BP (blood pressure) < 140/90  -     amLODIPine (NORVASC) 5 MG tablet; Take 1 tablet (5 mg) by mouth daily    Screening for diabetes mellitus  -     Hemoglobin A1c; Future  -     Hemoglobin A1c    Screening for prostate cancer  -     Prostate Specific Antigen Screen; Future  -     Prostate Specific Antigen Screen    Fatigue, unspecified type  -     CBC with Platelets & Differential; Future  -     TSH with free T4 reflex; Future  -     CBC with Platelets & Differential  -     TSH with free T4 reflex    Hyperlipidemia LDL goal <100  -     Comprehensive metabolic panel; Future  -     Lipid panel reflex to direct LDL Fasting; Future  -     Comprehensive metabolic panel  -     Lipid panel reflex to direct LDL Fasting    OA multiple sites    Discussed several things with patient  Likely he will see us for preop prior to the tka  We want to get blood pressure under control before then  Start daily blood pressure med  Check labs  Completed handicap parking form for patient   He uses cane, has difficult time ambulating  Check labs  Fit test       Patient has been advised of split billing requirements and indicates understanding: Yes      COUNSELING:   Reviewed preventive health counseling, as reflected in patient instructions       Regular exercise       Healthy diet/nutrition       Vision screening       Colorectal cancer screening       Prostate cancer screening       BMI:   Estimated body mass index is 26.21 kg/m  as calculated from  "the following:    Height as of this encounter: 1.753 m (5' 9\").    Weight as of this encounter: 80.5 kg (177 lb 8 oz).   Weight management plan: Discussed healthy diet and exercise guidelines      He reports that he has never smoked. He has never used smokeless tobacco.         Tushar Granados MD  [facility] [place]  Answers for HPI/ROS submitted by the patient on 4/17/2023  If you checked off any problems, how difficult have these problems made it for you to do your work, take care of things at home, or get along with other people?: Not difficult at all  PHQ9 TOTAL SCORE: 10      "

## 2023-04-25 NOTE — RESULT ENCOUNTER NOTE
Cholesterol is moderately high.  Keep working on healthy diet/exercise as you are able.    Other labs are okay.    Normal hemoglobin a1c means no diabetes.    Tushar Granados MD

## 2023-04-26 ASSESSMENT — HOOS JR
LYING IN BED (TURNING OVER, MAINTAINING HIP POSITION): EXTREME
SITTING: MODERATE
RISING FROM SITTING: MODERATE
BENDING TO THE FLOOR TO PICK UP OBJECT: SEVERE
HOOS JR TOTAL INTERVAL SCORE: 43.34
WALKING ON UNEVEN SURFACE: MODERATE
GOING UP OR DOWN STAIRS: MODERATE

## 2023-05-02 NOTE — PROGRESS NOTES
Chief Complaint:   Chief Complaint   Patient presents with     Right Hip - Pain     Right hip osteoarthritis. Onset: 2020. He decided to have a right total knee arthroplasty before the hip, but has recovered and is now ready to discuss total hip arthroplasty.       HISTORY OF PRESENT ILLNESS    Case Epstein is a 63 year old male seen for evaluation of ongoing right hip pain with no known injury. Has known endstage right hip osteoarthritis.  Pain has been present since 8/2020. He notes one day he bent down to put on his sock on the right foot and couldn't bend all the way down. Locates pain to the groin area. Pain is worse with walking, moving. Ok at rest sitting, not bad at night, once in a while. Difficulties getting in/out of car, donning shoes/socks. Thinks a little shorter on the right compared to the left, but not for sure, seems to have improved since his right knee replacement. Takes Aleve or ibuprofen for pain. Left hip is hurting as well, not as bad.    Denies prior right hip problems until 8/2020. Denies low back pain, numbness and tingling.    He is status post right total knee arthroplasty 5/2021, doing well.    History left ankle fusion. Also some problems/pain with left shoulder.    Pain severity: 6/10  Pain quality: aching      Other PMH:  has a past medical history of High cholesterol and OA (OSTEOARTHRITIS) OF KNEE - right (9/27/2011).    He has no past medical history of Bleeding disorder (H), Cancer (H), Congestive heart failure, unspecified, COPD (chronic obstructive pulmonary disease) (H), Depressive disorder, Diabetes (H), Heart disease, History of blood transfusion, Hypertension, Inflammatory arthritis, Kidney disease, Stroke (H), Surgical complications, Thyroid disease, Type II or unspecified type diabetes mellitus without mention of complication, not stated as uncontrolled, Uncomplicated asthma, or Unspecified asthma(493.90).  Patient Active Problem List   Diagnosis     OA  "(OSTEOARTHRITIS) OF KNEE - right     Vitamin D deficiency disease     Advanced directives, counseling/discussion     CARDIOVASCULAR SCREENING; LDL GOAL LESS THAN 100     Pain in joint, ankle and foot, left       Surgical Hx:  has a past surgical history that includes PALATE/UVULA SURGERY UNLISTED (age 2 and in 1986); colonoscopy; Arthrodesis ankle (Left, 11/20/2017); and orthopedic surgery (Right).    Medications:   Current Outpatient Medications:      acetaminophen (TYLENOL) 500 MG tablet, Take 500-1,000 mg by mouth, Disp: , Rfl:      amLODIPine (NORVASC) 5 MG tablet, Take 1 tablet (5 mg) by mouth daily, Disp: 30 tablet, Rfl: 2     ibuprofen (ADVIL/MOTRIN) 200 MG tablet, Take 200 mg by mouth every 4 hours as needed for mild pain, Disp: , Rfl:      naproxen sodium 220 MG capsule, Take 220 mg by mouth 2 times daily (with meals), Disp: , Rfl:     Allergies: No Known Allergies    Social Hx: retired.  reports that he has never smoked. He has never used smokeless tobacco. He reports that he does not drink alcohol and does not use drugs.    Family Hx: family history includes Arthritis in his maternal grandmother and mother; Cerebrovascular Disease in his maternal grandfather and maternal grandmother; Diabetes in his maternal grandmother; Eye Disorder in his brother and mother; Hearing Loss in his mother; Hypertension in his mother; Other - See Comments in his brother and father.    REVIEW OF SYSTEMS:  10 point ROS neg other than the symptoms noted above in the HPI and PAST MEDICAL HISTORY. Notables,  CONSTITUTIONAL:NEGATIVE for fever, chills, change in weight  INTEGUMENTARY/SKIN: NEGATIVE for worrisome rashes, moles or lesions  MUSCULOSKELETAL:See HPI above  NEURO: NEGATIVE for weakness, dizziness or paresthesias    PHYSICAL EXAM:  Ht 1.753 m (5' 9\")   Wt 79.4 kg (175 lb)   BMI 25.84 kg/m     GENERAL APPEARANCE: healthy, alert, no distress  SKIN: no suspicious lesions or rashes  NEURO: Normal strength and tone, " "mentation intact and speech normal  PSYCH:  mentation appears normal and affect normal  RESPIRATORY: No increased work of breathing.  LYMPH: no palpable inguinal lymph nodes.    BILATERAL LOWER EXTREMITIES:  Gait: antalgic favoring right , using a cane  Bilateral Quad atrophy, strength weak. Noted left calf atrophy.  Intact sensation deep peroneal nerve, superficial peroneal nerve, med/lat tibial nerve, sural nerve, saphenous nerve  Intact EHL, EDL, TA, FHL, GS, quadriceps hamstrings and hip flexors  Bilateral calf soft and nttp or squeeze.  Edema: trace  Leg lengths: right appears shorter than left at medial malleolus.    LEFT HIP EXAM:      Palpation: Tender:   none  Strength:  4/5  Special tests:  Irritability (flexion/adduction/internal rotation)  positive.  Hip range of motion: Internal rotation: 5, External rotation: 40, Flexion 90 with obligatory external rotation.      RIGHT HIP EXAM:    Palpation: Tender:   Right groin  Strength:  4/5  Special tests:  Irritability (flexion/adduction/internal rotation) positive   Hip range of motion: Internal rotation: lacks 5-10 degrees from neutral, External rotation: 20, Flexion 75 with obligatory external rotation, pain with extremes of rotation      X-RAY: AP pelvis and AP/Lateral views right hip from 5/3/2023 were reviewed in clinic today. No obvious fractures or dislocations.  Progression to severe right hip degenerative changes with loss of superior joint space with adjacent subchondral sclerosis of the acetabulum, subchondral acetabular cysts. There is been lateralization of the femoral head relative to the acetabulum, with flattening of the femoral head with femoral head sclerosis. Large marginal osteophytes. severe left hip degenerative changes with loss of superolateral joint space to bone on bone. Bilateral pincer deformity. Bilateral coxa valga.        Impression: 64yo male with chronic right hip pain, advanced \"end stage\" right hip primary " "osteoarthritis      Plan:     * discussed pain in groin/hip likely from advanced hip arthritis. This is wearing of the cartilage within the hip joint either due to normal \"wear and tear\" or following an injury. Any low back / buttock / radiating pain likely coming from the low back.  Left hip is not far behind the right. Both hips have really progress since 2021.  *  treatment options for hip arthritis and pain include: do nothing, NSAIDS, activity modification, Physical Therapy, injections, total hip arthroplasty. Risks and benefits of each discussed.   * did discuss patient is a candidate for total hip arthroplasty, and offered total hip arthroplasty to patient. Total hip arthroplasty will only attempt to provide pain relief from hip related arthritis only and not any pain from the low back. Any low back pain likely to continue.  *  Discussed risks of surgery include, but not limited to: bleeding, infection, pain, scar, damage to adjacent structures (e.g. Nerves, blood vessels, bone, cartilage), temporary or permanent nerve damage, recurrence of symptoms, implant dislocation, implant failure, implant infection, unequal limb lengths, stiffness, need for further surgery, blood clots, pulmonary embolism, risks of anesthesia, and death.    * understanding the risks of surgery, as a quality of life decision, patient would like to proceed with surgery: RIGHT total hip arthroplasty.  * will arrange RIGHT total hip arthroplasty at a mutual convenience in the near future    * discussed patient and their  will plan hospitalization overnight with discharge home the next morning, daily physical therapy starting either the day of or day after surgery.  * will plan postoperative deep vein thrombosis prophylaxis x4 weeks.  Additionally, graduated compression stockings x4 weeks, and SCDs while in the hospital.  * postoperative pain control will be oral medications upon discharge from hospital  * postoperative Physical " Therapy will be discussed, if needed, at first postoperative visit at 2 weeks  * patient will need longterm prophylactic antibiotics use with dental procedures or other invasive procedures (2 g amoxicilin or 450mg ([id number]) clindamycin) 1 hour prior to dental procedures.  * patient will need preoperative H+P prior to surgery from PCP.  * will see patient 2 weeks postoperative, sooner as needed. Will obtain lowset AP pelvis and lateral right hip xray at that time.      * All questions were addressed and answered prior to discharge from clinic today. The patient acknowledges an understanding of and agreement with the plan set forth during today's visit. Patient was advised to call our office or MyChart us if any further questions arise upon leaving our office today.        Dennis Randle M.D., M.S.  Dept. of Orthopaedic Surgery  [facility]

## 2023-05-03 ENCOUNTER — ANCILLARY PROCEDURE (OUTPATIENT)
Dept: GENERAL RADIOLOGY | Facility: CLINIC | Age: 63
End: 2023-05-03
Attending: PHYSICIAN ASSISTANT
Payer: COMMERCIAL

## 2023-05-03 ENCOUNTER — TELEPHONE (OUTPATIENT)
Dept: SURGERY | Facility: CLINIC | Age: 63
End: 2023-05-03

## 2023-05-03 ENCOUNTER — OFFICE VISIT (OUTPATIENT)
Dept: ORTHOPEDICS | Facility: CLINIC | Age: 63
End: 2023-05-03
Payer: COMMERCIAL

## 2023-05-03 VITALS — WEIGHT: 175 LBS | HEIGHT: 69 IN | BODY MASS INDEX: 25.92 KG/M2

## 2023-05-03 DIAGNOSIS — M25.551 ACUTE RIGHT HIP PAIN: ICD-10-CM

## 2023-05-03 DIAGNOSIS — M25.551 ACUTE RIGHT HIP PAIN: Primary | ICD-10-CM

## 2023-05-03 DIAGNOSIS — M16.11 PRIMARY OSTEOARTHRITIS OF RIGHT HIP: ICD-10-CM

## 2023-05-03 PROCEDURE — 73502 X-RAY EXAM HIP UNI 2-3 VIEWS: CPT | Mod: TC | Performed by: RADIOLOGY

## 2023-05-03 PROCEDURE — 99214 OFFICE O/P EST MOD 30 MIN: CPT | Performed by: ORTHOPAEDIC SURGERY

## 2023-05-03 RX ORDER — TRANEXAMIC ACID 650 MG/1
1950 TABLET ORAL ONCE
Status: CANCELLED | OUTPATIENT
Start: 2023-05-03 | End: 2023-05-03

## 2023-05-03 ASSESSMENT — PAIN SCALES - GENERAL: PAINLEVEL: SEVERE PAIN (6)

## 2023-05-03 NOTE — TELEPHONE ENCOUNTER
Type of surgery: ARTHROPLASTY, HIP, TOTAL, DIRECT ANTERIOR APPROACH (Right)     Location of surgery: [place] OR  Date and time of surgery: 5/30  Surgeon: Jer   Pre-Op Appt Date: 5/22  Post-Op Appt Date: 6/19  Packet sent out: Yes  Pre-cert/Authorization completed:    Date:

## 2023-05-04 NOTE — TELEPHONE ENCOUNTER
Left message for patient to call us back to discuss surgery dates    Rylee Cunha M.A.  Specialty surgery Scheduler          Hemostasis: Drysol

## 2023-05-10 ENCOUNTER — TELEPHONE (OUTPATIENT)
Dept: FAMILY MEDICINE | Facility: CLINIC | Age: 63
End: 2023-05-10
Payer: COMMERCIAL

## 2023-05-10 PROCEDURE — 82274 ASSAY TEST FOR BLOOD FECAL: CPT | Performed by: FAMILY MEDICINE

## 2023-05-10 NOTE — TELEPHONE ENCOUNTER
Patient Quality Outreach    Patient is due for the following:   Colon Cancer Screening    Next Steps:   patient should complete a fit test    Type of outreach:    Sent [employer] message.    Next Steps:  Reach out within 90 days via Phone.    Max number of attempts reached: No. Will try again in 90 days if patient still on fail list.    Questions for provider review:    None           Zoe Plata, [facility]  Chart routed to Care Team.

## 2023-05-12 LAB — HEMOCCULT STL QL IA: NEGATIVE

## 2023-05-15 ENCOUNTER — MYC MEDICAL ADVICE (OUTPATIENT)
Dept: FAMILY MEDICINE | Facility: CLINIC | Age: 63
End: 2023-05-15
Payer: COMMERCIAL

## 2023-05-16 ENCOUNTER — MYC MEDICAL ADVICE (OUTPATIENT)
Dept: ORTHOPEDICS | Facility: CLINIC | Age: 63
End: 2023-05-16
Payer: COMMERCIAL

## 2023-05-16 DIAGNOSIS — M16.11 PRIMARY OSTEOARTHRITIS OF RIGHT HIP: Primary | ICD-10-CM

## 2023-05-19 NOTE — TELEPHONE ENCOUNTER
Order bridget.    Dennis Randle M.D., M.S.  Dept. of Orthopaedic Surgery  [facility]

## 2023-05-19 NOTE — TELEPHONE ENCOUNTER
Patient Quality Outreach    Patient is due for the following:   Colon Cancer Screening    Next Steps:   patient should complete a fit test    Type of outreach:    patient has reviewed my chart message    Next Steps:  Reach out within 90 days via Phone.    Max number of attempts reached: Yes. Will try again in 90 days if patient still on fail list.    Questions for provider review:    None           Zoe Plata, [facility]  Chart routed to chart closed.

## 2023-05-22 ENCOUNTER — OFFICE VISIT (OUTPATIENT)
Dept: FAMILY MEDICINE | Facility: CLINIC | Age: 63
End: 2023-05-22
Payer: COMMERCIAL

## 2023-05-22 VITALS
WEIGHT: 175 LBS | RESPIRATION RATE: 16 BRPM | OXYGEN SATURATION: 98 % | BODY MASS INDEX: 25.92 KG/M2 | DIASTOLIC BLOOD PRESSURE: 72 MMHG | TEMPERATURE: 97.9 F | HEART RATE: 92 BPM | SYSTOLIC BLOOD PRESSURE: 139 MMHG | HEIGHT: 69 IN

## 2023-05-22 DIAGNOSIS — M17.11 PRIMARY OSTEOARTHRITIS OF RIGHT KNEE: ICD-10-CM

## 2023-05-22 DIAGNOSIS — Z02.89 ENCOUNTER FOR COMPLETION OF FORM WITH PATIENT: ICD-10-CM

## 2023-05-22 DIAGNOSIS — Z01.818 PREOP GENERAL PHYSICAL EXAM: Primary | ICD-10-CM

## 2023-05-22 DIAGNOSIS — I10 HYPERTENSION GOAL BP (BLOOD PRESSURE) < 140/90: ICD-10-CM

## 2023-05-22 PROCEDURE — 99214 OFFICE O/P EST MOD 30 MIN: CPT | Performed by: FAMILY MEDICINE

## 2023-05-22 RX ORDER — AMLODIPINE BESYLATE 5 MG/1
5 TABLET ORAL DAILY
Qty: 90 TABLET | Refills: 3 | Status: SHIPPED | OUTPATIENT
Start: 2023-05-22 | End: 2024-05-09

## 2023-05-22 ASSESSMENT — PAIN SCALES - GENERAL: PAINLEVEL: MODERATE PAIN (5)

## 2023-05-22 NOTE — H&P (VIEW-ONLY)
[address]  [place] [address]  Phone: [phone]  Primary Provider: Rere Campuzano  Pre-op Performing Provider: RERE CAMPUZANO       PREOPERATIVE EVALUATION:  Today's date: 5/22/2023    Case Epstein is a 63 year old male who presents for a preoperative evaluation.      5/22/2023     9:36 AM   Additional Questions   Roomed by Zoe Plata     Surgical Information:  Surgery/Procedure: Total right hip arthroplasty  Surgery Location: [place]  Surgeon: Jer  Surgery Date: 05/30/2023  Time of Surgery: 11:30am  Where patient plans to recover: At home with family  Fax number for surgical facility: Note does not need to be faxed, will be available electronically in Epic.         Subjective       HPI related to upcoming procedure: 63 year old male to have right GUSTAVO due to end stage osteoarthritis        5/15/2023     3:18 PM   Preop Questions   1. Have you ever had a heart attack or stroke? No   2. Have you ever had surgery on your heart or blood vessels, such as a stent placement, a coronary artery bypass, or surgery on an artery in your head, neck, heart, or legs? No   3. Do you have chest pain with activity? No   4. Do you have a history of  heart failure? No   5. Do you currently have a cold, bronchitis or symptoms of other infection? No   6. Do you have a cough, shortness of breath, or wheezing? No   7. Do you or anyone in your family have previous history of blood clots? No   8. Do you or does anyone in your family have a serious bleeding problem such as prolonged bleeding following surgeries or cuts? No   9. Have you ever had problems with anemia or been told to take iron pills? No   10. Have you had any abnormal blood loss such as black, tarry or bloody stools? No   11. Have you ever had a blood transfusion? No   12. Are you willing to have a blood transfusion if it is medically needed before, during, or after your surgery? Yes   13. Have you or any of  your relatives ever had problems with anesthesia? No   14. Do you have sleep apnea, excessive snoring or daytime drowsiness? No   15. Do you have any artifical heart valves or other implanted medical devices like a pacemaker, defibrillator, or continuous glucose monitor? No   16. Do you have artificial joints? YES -right knee   17. Are you allergic to latex? No       Health Care Directive:  Patient does not have a Health Care Directive or Living Will    Preoperative Review of :  Not on any controlled substances            Review of Systems  Constitutional, neuro, ENT, endocrine, pulmonary, cardiac, gastrointestinal, genitourinary, musculoskeletal, integument and psychiatric systems are negative, except as otherwise noted.    No recent illnesses    Limited mobility due to hips.  This is the right hip this time but may get left hip done later this year.     No cardiac history     No chest pain/ breathing problems    No bleeding or clotting disorders    No history of anesthesia problems        Patient Active Problem List    Diagnosis Date Noted     Pain in joint, ankle and foot, left 01/08/2018     Priority: Medium     CARDIOVASCULAR SCREENING; LDL GOAL LESS THAN 100 08/16/2017     Priority: Medium     Advanced directives, counseling/discussion 07/03/2015     Priority: Medium     Advance Care Planning 7/7/2015: ACP Review and Resources Provided:  Reviewed chart for advance care plan.  Case Epstein has no plan or code status on file. Discussed available resources and provided with information. Confirmed code status reflects current choices pending further ACP discussions.  Confirmed/documented legally designated decision maker(s).  Added by Noemi Sales             Vitamin D deficiency disease 06/26/2013     Priority: Medium     OA (OSTEOARTHRITIS) OF KNEE - right 09/27/2011     Priority: Medium      Past Medical History:   Diagnosis Date     High cholesterol      OA (OSTEOARTHRITIS) OF KNEE - right  "9/27/2011     Past Surgical History:   Procedure Laterality Date     ARTHRODESIS ANKLE Left 11/20/2017    Procedure: ARTHRODESIS ANKLE;  LEFT ANKLE FUSION (C-ARM);  Surgeon: Shaggy Linn MD;  Location: [facility]     COLONOSCOPY       ORTHOPEDIC SURGERY Right     right TKA May 2021     ZZC AFF PALATE/UVULA SURGERY UNLISTED  age 2 and in 1986    2 procedures for palate/nasal issues     Current Outpatient Medications   Medication Sig Dispense Refill     acetaminophen (TYLENOL) 500 MG tablet Take 500-1,000 mg by mouth       amLODIPine (NORVASC) 5 MG tablet Take 1 tablet (5 mg) by mouth daily 30 tablet 2     ibuprofen (ADVIL/MOTRIN) 200 MG tablet Take 200 mg by mouth every 4 hours as needed for mild pain       naproxen sodium 220 MG capsule Take 220 mg by mouth 2 times daily (with meals)         No Known Allergies     Social History     Tobacco Use     Smoking status: Never     Smokeless tobacco: Never   Vaping Use     Vaping status: Never Used   Substance Use Topics     Alcohol use: No     Comment: occasional        History   Drug Use No         Objective     /72 (BP Location: Left arm, Patient Position: Chair, Cuff Size: Adult Regular)   Pulse 92   Temp 97.9  F (36.6  C)   Resp 16   Ht 1.753 m (5' 9\")   Wt 79.4 kg (175 lb)   SpO2 98%   BMI 25.84 kg/m      Physical Exam    GENERAL APPEARANCE: healthy, alert and no distress     EYES: EOMI,  PERRL     HENT: nose and mouth without ulcers or lesions and right tympanic membrane not well seen due to large amount of cerumen.  Left tympanic membrane fine.     NECK: no adenopathy, no asymmetry, masses, or scars and thyroid normal to palpation     RESP: lungs clear to auscultation - no rales, rhonchi or wheezes     CV: regular rates and rhythm, normal S1 S2, no S3 or S4 and no murmur, click or rub     ABDOMEN:  soft, nontender, no HSM or masses and bowel sounds normal     MS: patient uses can.  Very limited range of motion of hips. Moves slowly.      SKIN: no " suspicious lesions or rashes     NEURO: Normal strength and tone, sensory exam grossly normal, mentation intact and speech normal     PSYCH: mentation appears normal. and affect normal/bright     LYMPHATICS: No cervical adenopathy    Recent Labs   Lab Test 04/24/23  0852   HGB 15.6         POTASSIUM 4.2   CR 0.82   A1C 5.3        Diagnostics:      See labs from 4-    No ekg needed given no cardiac  History or symptoms.        ASSESSMENT / PLAN:  (Z01.818) Preop general physical exam  (primary encounter diagnosis)  Comment: patient overall in very stable health.  Okay for hip procedure  Plan: as above     (M17.11) Primary osteoarthritis of right knee  Comment: to have GUSTAVO   Plan: as above      Encounter form completion: patient needed letter saying he is fine to drive.  He has parking sticker due to using cane.  See letter in chart.     Htn: well controlled, refill amlodipine.    I reviewed the patient's medications, allergies, medical history, family history, and social history.    Tushar Granados MD            Revised Cardiac Risk Index (RCRI):  The patient has the following serious cardiovascular risks for perioperative complications:   - No serious cardiac risks = 0 points     RCRI Interpretation: 0 points: Class I (very low risk - 0.4% complication rate)           Signed Electronically by: Tushar Granados MD  Copy of this evaluation report is provided to requesting physician.

## 2023-05-22 NOTE — LETTER
May 22, 2023      To [place] [employer]      This  letter regards a long  term patient of mine, Case Epstein  60    Reference Letter ID [id number] and Letter ID [id number]      Case is a long term patient of mine who has  handicap parking sticker due to his use of a cane. Has used this for a couple years and will need for the forseeable future.    However, he is otherwise  in good physical , mental, and psychological  health.  He is certainly safe to drive.  No problems  recently at all.    He is very adequate  to exercise  reasonable and proper motor vehicle  control.                 Sincerely,                     Tushar Granados MD

## 2023-05-22 NOTE — PATIENT INSTRUCTIONS
No aspirin, ibuprofen, or naproxen the week prior    Tylenol ( acetaminophen  ) okay that week    Do take amlodipine early am of procedure with sip of water

## 2023-05-22 NOTE — PROGRESS NOTES
[address]  [place] [address]  Phone: [phone]  Primary Provider: Reer Campuzano  Pre-op Performing Provider: RERE CAMPUZANO       PREOPERATIVE EVALUATION:  Today's date: 5/22/2023    Case Epstein is a 63 year old male who presents for a preoperative evaluation.      5/22/2023     9:36 AM   Additional Questions   Roomed by Zoe Plata     Surgical Information:  Surgery/Procedure: Total right hip arthroplasty  Surgery Location: [place]  Surgeon: Jer  Surgery Date: 05/30/2023  Time of Surgery: 11:30am  Where patient plans to recover: At home with family  Fax number for surgical facility: Note does not need to be faxed, will be available electronically in Epic.         Subjective       HPI related to upcoming procedure: 63 year old male to have right GUSTAVO due to end stage osteoarthritis        5/15/2023     3:18 PM   Preop Questions   1. Have you ever had a heart attack or stroke? No   2. Have you ever had surgery on your heart or blood vessels, such as a stent placement, a coronary artery bypass, or surgery on an artery in your head, neck, heart, or legs? No   3. Do you have chest pain with activity? No   4. Do you have a history of  heart failure? No   5. Do you currently have a cold, bronchitis or symptoms of other infection? No   6. Do you have a cough, shortness of breath, or wheezing? No   7. Do you or anyone in your family have previous history of blood clots? No   8. Do you or does anyone in your family have a serious bleeding problem such as prolonged bleeding following surgeries or cuts? No   9. Have you ever had problems with anemia or been told to take iron pills? No   10. Have you had any abnormal blood loss such as black, tarry or bloody stools? No   11. Have you ever had a blood transfusion? No   12. Are you willing to have a blood transfusion if it is medically needed before, during, or after your surgery? Yes   13. Have you or any of  your relatives ever had problems with anesthesia? No   14. Do you have sleep apnea, excessive snoring or daytime drowsiness? No   15. Do you have any artifical heart valves or other implanted medical devices like a pacemaker, defibrillator, or continuous glucose monitor? No   16. Do you have artificial joints? YES -right knee   17. Are you allergic to latex? No       Health Care Directive:  Patient does not have a Health Care Directive or Living Will    Preoperative Review of :  Not on any controlled substances            Review of Systems  Constitutional, neuro, ENT, endocrine, pulmonary, cardiac, gastrointestinal, genitourinary, musculoskeletal, integument and psychiatric systems are negative, except as otherwise noted.    No recent illnesses    Limited mobility due to hips.  This is the right hip this time but may get left hip done later this year.     No cardiac history     No chest pain/ breathing problems    No bleeding or clotting disorders    No history of anesthesia problems        Patient Active Problem List    Diagnosis Date Noted     Pain in joint, ankle and foot, left 01/08/2018     Priority: Medium     CARDIOVASCULAR SCREENING; LDL GOAL LESS THAN 100 08/16/2017     Priority: Medium     Advanced directives, counseling/discussion 07/03/2015     Priority: Medium     Advance Care Planning 7/7/2015: ACP Review and Resources Provided:  Reviewed chart for advance care plan.  Case Epstein has no plan or code status on file. Discussed available resources and provided with information. Confirmed code status reflects current choices pending further ACP discussions.  Confirmed/documented legally designated decision maker(s).  Added by Noemi Sales             Vitamin D deficiency disease 06/26/2013     Priority: Medium     OA (OSTEOARTHRITIS) OF KNEE - right 09/27/2011     Priority: Medium      Past Medical History:   Diagnosis Date     High cholesterol      OA (OSTEOARTHRITIS) OF KNEE - right  "9/27/2011     Past Surgical History:   Procedure Laterality Date     ARTHRODESIS ANKLE Left 11/20/2017    Procedure: ARTHRODESIS ANKLE;  LEFT ANKLE FUSION (C-ARM);  Surgeon: Shaggy Linn MD;  Location: [facility]     COLONOSCOPY       ORTHOPEDIC SURGERY Right     right TKA May 2021     ZZC AFF PALATE/UVULA SURGERY UNLISTED  age 2 and in 1986    2 procedures for palate/nasal issues     Current Outpatient Medications   Medication Sig Dispense Refill     acetaminophen (TYLENOL) 500 MG tablet Take 500-1,000 mg by mouth       amLODIPine (NORVASC) 5 MG tablet Take 1 tablet (5 mg) by mouth daily 30 tablet 2     ibuprofen (ADVIL/MOTRIN) 200 MG tablet Take 200 mg by mouth every 4 hours as needed for mild pain       naproxen sodium 220 MG capsule Take 220 mg by mouth 2 times daily (with meals)         No Known Allergies     Social History     Tobacco Use     Smoking status: Never     Smokeless tobacco: Never   Vaping Use     Vaping status: Never Used   Substance Use Topics     Alcohol use: No     Comment: occasional        History   Drug Use No         Objective     /72 (BP Location: Left arm, Patient Position: Chair, Cuff Size: Adult Regular)   Pulse 92   Temp 97.9  F (36.6  C)   Resp 16   Ht 1.753 m (5' 9\")   Wt 79.4 kg (175 lb)   SpO2 98%   BMI 25.84 kg/m      Physical Exam    GENERAL APPEARANCE: healthy, alert and no distress     EYES: EOMI,  PERRL     HENT: nose and mouth without ulcers or lesions and right tympanic membrane not well seen due to large amount of cerumen.  Left tympanic membrane fine.     NECK: no adenopathy, no asymmetry, masses, or scars and thyroid normal to palpation     RESP: lungs clear to auscultation - no rales, rhonchi or wheezes     CV: regular rates and rhythm, normal S1 S2, no S3 or S4 and no murmur, click or rub     ABDOMEN:  soft, nontender, no HSM or masses and bowel sounds normal     MS: patient uses can.  Very limited range of motion of hips. Moves slowly.      SKIN: no " suspicious lesions or rashes     NEURO: Normal strength and tone, sensory exam grossly normal, mentation intact and speech normal     PSYCH: mentation appears normal. and affect normal/bright     LYMPHATICS: No cervical adenopathy    Recent Labs   Lab Test 04/24/23  0852   HGB 15.6         POTASSIUM 4.2   CR 0.82   A1C 5.3        Diagnostics:      See labs from 4-    No ekg needed given no cardiac  History or symptoms.        ASSESSMENT / PLAN:  (Z01.818) Preop general physical exam  (primary encounter diagnosis)  Comment: patient overall in very stable health.  Okay for hip procedure  Plan: as above     (M17.11) Primary osteoarthritis of right knee  Comment: to have GUSTAVO   Plan: as above      Encounter form completion: patient needed letter saying he is fine to drive.  He has parking sticker due to using cane.  See letter in chart.     Htn: well controlled, refill amlodipine.    I reviewed the patient's medications, allergies, medical history, family history, and social history.    Tushar Granados MD            Revised Cardiac Risk Index (RCRI):  The patient has the following serious cardiovascular risks for perioperative complications:   - No serious cardiac risks = 0 points     RCRI Interpretation: 0 points: Class I (very low risk - 0.4% complication rate)           Signed Electronically by: Tushar Granados MD  Copy of this evaluation report is provided to requesting physician.

## 2023-05-23 NOTE — PROVIDER NOTIFICATION
05/23/23 1137   Discharge Planning   Patient/Family Anticipates Transition to home;home with family   Concerns to be Addressed all concerns addressed in this encounter   Living Arrangements   People in Home alone   Type of Residence Private Residence   Is your private residence a single family home or apartment? Single family home   Number of Stairs, Within Home, Primary seven   Stair Railings, Within Home, Primary railings safe and in good condition   Once home, are you able to live on one level? Yes   Which level? Main Level   Bathroom Shower/Tub Tub/Shower unit   Equipment Currently Used at Home cane, straight   Support System   Support Systems Children   Do you have someone available to stay with you one or two nights once you are home? Yes   Medical Clearance   It is recommended that you call and check with any specialty providers before surgery to see if you need surgical clearance.  Do you see any specialty providers outside of your primary care provider? Yes   Blood   Known Bleeding Disorder or Coagulopathy No   Does the patient have any [place]ism/cultural preferences related to blood products? No   Education   Has the patient scheduled or completed pre-op total joint education, either in class or online, in the last 12 months? Yes   Patient attended total joint pre-op class/received pre-op teaching  in person

## 2023-05-26 ENCOUNTER — ANESTHESIA EVENT (OUTPATIENT)
Dept: SURGERY | Facility: CLINIC | Age: 63
End: 2023-05-26
Payer: COMMERCIAL

## 2023-05-26 NOTE — ANESTHESIA PREPROCEDURE EVALUATION
Anesthesia Pre-Procedure Evaluation    Patient: Case Epstein   MRN: [id number] : 1960        Procedure : Procedure(s):  ARTHROPLASTY HIP TOTAL DIRECT ANTERIOR APPROACH          Past Medical History:   Diagnosis Date     High cholesterol      OA (OSTEOARTHRITIS) OF KNEE - right 2011      Past Surgical History:   Procedure Laterality Date     ARTHRODESIS ANKLE Left 2017    Procedure: ARTHRODESIS ANKLE;  LEFT ANKLE FUSION (C-ARM);  Surgeon: Shaggy Linn MD;  Location: [facility]     COLONOSCOPY       ORTHOPEDIC SURGERY Right     right TKA May 2021     ZZC AFF PALATE/UVULA SURGERY UNLISTED  age 2 and in     2 procedures for palate/nasal issues      No Known Allergies   Social History     Tobacco Use     Smoking status: Never     Smokeless tobacco: Never   Vaping Use     Vaping status: Never Used   Substance Use Topics     Alcohol use: No     Comment: occasional      Wt Readings from Last 1 Encounters:   23 79.4 kg (175 lb)        Anesthesia Evaluation   Pt has had prior anesthetic. Type: General, Regional and MAC.        ROS/MED HX  ENT/Pulmonary:  - neg pulmonary ROS     Neurologic:  - neg neurologic ROS     Cardiovascular:     (+) Dyslipidemia -----Previous cardiac testing   Echo: Date: Results:    Stress Test: Date: Results:    ECG Reviewed: Date: 2017 Results:  Sinus Bradycardia   -RSR(V1) -nondiagnostic.     PROBABLY NORMAL  Cath: Date: Results:      METS/Exercise Tolerance:     Hematologic:  - neg hematologic  ROS     Musculoskeletal:   (+) arthritis,     GI/Hepatic:  - neg GI/hepatic ROS     Renal/Genitourinary:  - neg Renal ROS     Endo:  - neg endo ROS     Psychiatric/Substance Use:  - neg psychiatric ROS     Infectious Disease:  - neg infectious disease ROS     Malignancy:  - neg malignancy ROS     Other:  - neg other ROS          Physical Exam    Airway        Mallampati: II   TM distance: > 3 FB   Neck ROM: full   Mouth opening: > 3 cm    Respiratory Devices and  Support         Dental           Cardiovascular   cardiovascular exam normal          Pulmonary   pulmonary exam normal                OUTSIDE LABS:  CBC:   Lab Results   Component Value Date    WBC 6.9 04/24/2023    WBC 5.7 05/05/2021    HGB 15.6 04/24/2023    HGB 15.4 05/05/2021    HCT 47.5 04/24/2023    HCT 46.3 05/05/2021     04/24/2023     05/05/2021     BMP:   Lab Results   Component Value Date     04/24/2023     05/05/2021    POTASSIUM 4.2 04/24/2023    POTASSIUM 4.2 05/05/2021    CHLORIDE 107 04/24/2023    CHLORIDE 106 05/05/2021    CO2 28 04/24/2023    CO2 29 05/05/2021    BUN 15 04/24/2023    BUN 17 05/05/2021    CR 0.82 04/24/2023    CR 0.86 05/05/2021     (H) 04/24/2023    GLC 93 05/05/2021     COAGS: No results found for: PTT, INR, FIBR  POC: No results found for: BGM, HCG, HCGS  HEPATIC:   Lab Results   Component Value Date    ALBUMIN 4.0 04/24/2023    PROTTOTAL 7.3 04/24/2023    ALT 19 04/24/2023    AST 13 04/24/2023    ALKPHOS 72 04/24/2023    BILITOTAL 0.6 04/24/2023     OTHER:   Lab Results   Component Value Date    A1C 5.3 04/24/2023    LALO 9.2 04/24/2023    TSH 3.11 04/24/2023       Anesthesia Plan    ASA Status:  2      Anesthesia Type: Spinal.   Induction: Propofol.   Maintenance: TIVA.        Consents    Anesthesia Plan(s) and associated risks, benefits, and realistic alternatives discussed. Questions answered and patient/representative(s) expressed understanding.     - Discussed: Risks, Benefits and Alternatives for BOTH SEDATION and the PROCEDURE were discussed     - Discussed with:  Patient         Postoperative Care    Pain management: IV analgesics, Oral pain medications, Multi-modal analgesia, Peripheral nerve block (Single Shot).   PONV prophylaxis: Ondansetron (or other 5HT-3), Dexamethasone or Solumedrol     Comments:                NATTY Tucker CRNA

## 2023-05-29 ASSESSMENT — ACTIVITIES OF DAILY LIVING (ADL)
WALKING_15_MINUTES_OR_GREATER: EXTREME DIFFICULTY
ROLLING OVER IN BED: MODERATE DIFFICULTY
JUMPING: UNABLE TO DO
WALKING_INITIALLY: MODERATE DIFFICULTY
TWISTING/PIVOTING ON INVOLVED LEG: UNABLE TO DO
WALKING_DOWN_STEEP_HILLS: EXTREME DIFFICULTY
GOING_UP_1_FLIGHT_OF_STAIRS: MODERATE DIFFICULTY
HOW_WOULD_YOU_RATE_YOUR_CURRENT_LEVEL_OF_FUNCTION_DURING_YOUR_USUAL_ACTIVITIES_OF_DAILY_LIVING_FROM_0_TO_100_WITH_100_BEING_YOUR_LEVEL_OF_FUNCTION_PRIOR_TO_YOUR_HIP_PROBLEM_AND_0_BEING_THE_INABILITY_TO_PERFORM_ANY_OF_YOUR_USUAL_DAILY_ACTIVITIES?: 50
SPORTS_TOTAL_ITEM_SCORE: 0
HOS_ADL_HIGHEST_POTENTIAL_SCORE: 64
GOING DOWN 1 FLIGHT OF STAIRS: MODERATE DIFFICULTY
SPORTS_COUNT: 9
WALKING_15_MINUTES_OR_GREATER: EXTREME DIFFICULTY
PUTTING_ON_SOCKS_AND_SHOES: EXTREME DIFFICULTY
ADL_TOTAL_ITEM_SCORE: 0
RUNNING_ONE_MILE: UNABLE TO DO
WALKING_INITIALLY: MODERATE DIFFICULTY
STEPPING_UP_AND_DOWN_CURBS: UNABLE TO DO
HEAVY_WORK: UNABLE TO DO
GETTING_INTO_AND_OUT_OF_AN_AVERAGE_CAR: EXTREME DIFFICULTY
HOW_WOULD_YOU_RATE_YOUR_CURRENT_LEVEL_OF_FUNCTION_DURING_YOUR_USUAL_ACTIVITIES_OF_DAILY_LIVING_FROM_0_TO_100_WITH_100_BEING_YOUR_LEVEL_OF_FUNCTION_PRIOR_TO_YOUR_HIP_PROBLEM_AND_0_BEING_THE_INABILITY_TO_PERFORM_ANY_OF_YOUR_USUAL_DAILY_ACTIVITIES?: 50
SPORTS_SCORE(%): 0
ABILITY_TO_PERFORM_ACTIVITY_WITH_YOUR_NORMAL_TECHNIQUE: UNABLE TO DO
WALKING_APPROXIMATELY_10_MINUTES: EXTREME DIFFICULTY
SITTING_FOR_15_MINUTES: UNABLE TO DO
DEEP_SQUATTING: UNABLE TO DO
HOS_ADL_ITEM_SCORE_TOTAL: 16
GOING UP 1 FLIGHT OF STAIRS: MODERATE DIFFICULTY
RECREATIONAL ACTIVITIES: EXTREME DIFFICULTY
LANDING: UNABLE TO DO
CUTTING/LATERAL_MOVEMENTS: UNABLE TO DO
SPORTS_HIGHEST_POTENTIAL_SCORE: 36
RECREATIONAL_ACTIVITIES: EXTREME DIFFICULTY
STANDING_FOR_15_MINUTES: UNABLE TO DO
STARTING_AND_STOPPING_QUICKLY: EXTREME DIFFICULTY
ROLLING_OVER_IN_BED: MODERATE DIFFICULTY
LOW_IMPACT_ACTIVITIES_LIKE_FAST_WALKING: UNABLE TO DO
PLEASE_INDICATE_YOR_PRIMARY_REASON_FOR_REFERRAL_TO_THERAPY:: HIP
HOW_WOULD_YOU_RATE_YOUR_CURRENT_LEVEL_OF_FUNCTION?: SEVERELY ABNORMAL
GOING_DOWN_1_FLIGHT_OF_STAIRS: MODERATE DIFFICULTY
STEPPING UP AND DOWN CURBS: UNABLE TO DO
ADL_SCORE(%): 0
WALKING_DOWN_STEEP_HILLS: EXTREME DIFFICULTY
WALKING_UP_STEEP_HILLS: EXTREME DIFFICULTY
TWISTING/PIVOTING_ON_INVOLVED_LEG: UNABLE TO DO
ADL_COUNT: 17
LIGHT_TO_MODERATE_WORK: MODERATE DIFFICULTY
WALKING_FOR_APPROXIMATELY_10_MINUTES: EXTREME DIFFICULTY
SITTING FOR 15 MINUTES: UNABLE TO DO
HOS_ADL_SCORE(%): 25
STANDING FOR 15 MINUTES: UNABLE TO DO
HEAVY_WORK: UNABLE TO DO
WALKING_UP_STEEP_HILLS: EXTREME DIFFICULTY
LIGHT_TO_MODERATE_WORK: MODERATE DIFFICULTY
HOW_WOULD_YOU_RATE_YOUR_CURRENT_LEVEL_OF_FUNCTION_DURING_YOUR_SPORTS_RELATED_ACTIVITIES_FROM_0_TO_100_WITH_100_BEING_YOUR_LEVEL_OF_FUNCTION_PRIOR_TO_YOUR_HIP_PROBLEM_AND_0_BEING_THE_INABILITY_TO_PERFORM_ANY_OF_YOUR_USUAL_DAILY_ACTIVITIES?: 50
SWINGING_OBJECTS_LIKE_A_GOLF_CLUB: UNABLE TO DO
DEEP SQUATTING: UNABLE TO DO
GETTING INTO AND OUT OF AN AVERAGE CAR: EXTREME DIFFICULTY
ADL_HIGHEST_POTENTIAL_SCORE: 68
PUTTING ON SOCKS AND SHOES: EXTREME DIFFICULTY

## 2023-05-30 ENCOUNTER — HOSPITAL ENCOUNTER (OUTPATIENT)
Facility: CLINIC | Age: 63
Discharge: HOME | End: 2023-05-31
Attending: ORTHOPAEDIC SURGERY | Admitting: ORTHOPAEDIC SURGERY
Payer: COMMERCIAL

## 2023-05-30 ENCOUNTER — APPOINTMENT (OUTPATIENT)
Dept: GENERAL RADIOLOGY | Facility: CLINIC | Age: 63
End: 2023-05-30
Attending: PHYSICIAN ASSISTANT
Payer: COMMERCIAL

## 2023-05-30 ENCOUNTER — APPOINTMENT (OUTPATIENT)
Dept: GENERAL RADIOLOGY | Facility: CLINIC | Age: 63
End: 2023-05-30
Attending: ORTHOPAEDIC SURGERY
Payer: COMMERCIAL

## 2023-05-30 ENCOUNTER — ANESTHESIA (OUTPATIENT)
Dept: SURGERY | Facility: CLINIC | Age: 63
End: 2023-05-30
Payer: COMMERCIAL

## 2023-05-30 DIAGNOSIS — Z96.641 S/P TOTAL RIGHT HIP ARTHROPLASTY: Primary | ICD-10-CM

## 2023-05-30 LAB
LACTATE SERPL-SCNC: 2.2 MMOL/L (ref 0.7–2)
LACTATE SERPL-SCNC: 5.2 MMOL/L (ref 0.7–2)

## 2023-05-30 PROCEDURE — C1776 JOINT DEVICE (IMPLANTABLE): HCPCS | Performed by: ORTHOPAEDIC SURGERY

## 2023-05-30 PROCEDURE — 370N000017 HC ANESTHESIA TECHNICAL FEE, PER MIN: Performed by: ORTHOPAEDIC SURGERY

## 2023-05-30 PROCEDURE — C1713 ANCHOR/SCREW BN/BN,TIS/BN: HCPCS | Performed by: ORTHOPAEDIC SURGERY

## 2023-05-30 PROCEDURE — 250N000009 HC RX 250: Performed by: NURSE ANESTHETIST, CERTIFIED REGISTERED

## 2023-05-30 PROCEDURE — 258N000003 HC RX IP 258 OP 636

## 2023-05-30 PROCEDURE — 27130 TOTAL HIP ARTHROPLASTY: CPT | Mod: AS | Performed by: PHYSICIAN ASSISTANT

## 2023-05-30 PROCEDURE — 258N000003 HC RX IP 258 OP 636: Performed by: NURSE ANESTHETIST, CERTIFIED REGISTERED

## 2023-05-30 PROCEDURE — 250N000011 HC RX IP 250 OP 636: Performed by: NURSE ANESTHETIST, CERTIFIED REGISTERED

## 2023-05-30 PROCEDURE — 27130 TOTAL HIP ARTHROPLASTY: CPT | Mod: RT | Performed by: ORTHOPAEDIC SURGERY

## 2023-05-30 PROCEDURE — 258N000003 HC RX IP 258 OP 636: Performed by: PHYSICIAN ASSISTANT

## 2023-05-30 PROCEDURE — 250N000011 HC RX IP 250 OP 636: Performed by: PHYSICIAN ASSISTANT

## 2023-05-30 PROCEDURE — 250N000013 HC RX MED GY IP 250 OP 250 PS 637: Performed by: PHYSICIAN ASSISTANT

## 2023-05-30 PROCEDURE — 999N000141 HC STATISTIC PRE-PROCEDURE NURSING ASSESSMENT: Performed by: ORTHOPAEDIC SURGERY

## 2023-05-30 PROCEDURE — 83605 ASSAY OF LACTIC ACID: CPT | Performed by: ORTHOPAEDIC SURGERY

## 2023-05-30 PROCEDURE — 710N000009 HC RECOVERY PHASE 1, LEVEL 1, PER MIN: Performed by: ORTHOPAEDIC SURGERY

## 2023-05-30 PROCEDURE — 250N000011 HC RX IP 250 OP 636: Performed by: ORTHOPAEDIC SURGERY

## 2023-05-30 PROCEDURE — 36415 COLL VENOUS BLD VENIPUNCTURE: CPT | Performed by: ORTHOPAEDIC SURGERY

## 2023-05-30 PROCEDURE — 271N000001 HC OR GENERAL SUPPLY NON-STERILE: Performed by: ORTHOPAEDIC SURGERY

## 2023-05-30 PROCEDURE — 999N000065 XR PELVIS PORT 1/2 VIEWS

## 2023-05-30 PROCEDURE — 250N000013 HC RX MED GY IP 250 OP 250 PS 637: Performed by: NURSE ANESTHETIST, CERTIFIED REGISTERED

## 2023-05-30 PROCEDURE — 999N000180 XR SURGERY CARM FLUORO LESS THAN 5 MIN: Mod: TC

## 2023-05-30 PROCEDURE — 250N000009 HC RX 250: Performed by: ORTHOPAEDIC SURGERY

## 2023-05-30 PROCEDURE — 272N000001 HC OR GENERAL SUPPLY STERILE: Performed by: ORTHOPAEDIC SURGERY

## 2023-05-30 PROCEDURE — 360N000084 HC SURGERY LEVEL 4 W/ FLUORO, PER MIN: Performed by: ORTHOPAEDIC SURGERY

## 2023-05-30 DEVICE — IMP HEAD FEMORAL DEPUY CERAMIC 36MM +8MM 1365-36-330: Type: IMPLANTABLE DEVICE | Site: HIP | Status: FUNCTIONAL

## 2023-05-30 DEVICE — IMP APEX HOLE ELIMINATOR HIP DEPUY DURALOC 1246-03-000: Type: IMPLANTABLE DEVICE | Site: HIP | Status: FUNCTIONAL

## 2023-05-30 DEVICE — IMPLANTABLE DEVICE: Type: IMPLANTABLE DEVICE | Site: HIP | Status: FUNCTIONAL

## 2023-05-30 DEVICE — BONE CEMENT SIMPLEX FULL DOSE 6191-1-001: Type: IMPLANTABLE DEVICE | Site: HIP | Status: FUNCTIONAL

## 2023-05-30 DEVICE — IMP LINER HIP DEPUY PINNACLE ALTRX 36X56MM +4 1221-36-456: Type: IMPLANTABLE DEVICE | Site: HIP | Status: FUNCTIONAL

## 2023-05-30 RX ORDER — SODIUM CHLORIDE, SODIUM LACTATE, POTASSIUM CHLORIDE, CALCIUM CHLORIDE 600; 310; 30; 20 MG/100ML; MG/100ML; MG/100ML; MG/100ML
INJECTION, SOLUTION INTRAVENOUS CONTINUOUS
Status: DISCONTINUED | OUTPATIENT
Start: 2023-05-30 | End: 2023-05-31 | Stop reason: HOSPADM

## 2023-05-30 RX ORDER — SODIUM CHLORIDE, SODIUM LACTATE, POTASSIUM CHLORIDE, CALCIUM CHLORIDE 600; 310; 30; 20 MG/100ML; MG/100ML; MG/100ML; MG/100ML
INJECTION, SOLUTION INTRAVENOUS CONTINUOUS
Status: DISCONTINUED | OUTPATIENT
Start: 2023-05-30 | End: 2023-05-30 | Stop reason: HOSPADM

## 2023-05-30 RX ORDER — ACETAMINOPHEN 325 MG/1
975 TABLET ORAL ONCE
Status: COMPLETED | OUTPATIENT
Start: 2023-05-30 | End: 2023-05-30

## 2023-05-30 RX ORDER — ROPIVACAINE HYDROCHLORIDE 5 MG/ML
INJECTION, SOLUTION EPIDURAL; INFILTRATION; PERINEURAL
Status: DISCONTINUED | OUTPATIENT
Start: 2023-05-30 | End: 2023-05-30

## 2023-05-30 RX ORDER — HYDROXYZINE HYDROCHLORIDE 25 MG/1
25 TABLET, FILM COATED ORAL EVERY 6 HOURS PRN
Status: DISCONTINUED | OUTPATIENT
Start: 2023-05-30 | End: 2023-05-31 | Stop reason: HOSPADM

## 2023-05-30 RX ORDER — POLYETHYLENE GLYCOL 3350 17 G/17G
17 POWDER, FOR SOLUTION ORAL DAILY
Status: DISCONTINUED | OUTPATIENT
Start: 2023-05-31 | End: 2023-05-31 | Stop reason: HOSPADM

## 2023-05-30 RX ORDER — MAGNESIUM SULFATE HEPTAHYDRATE 40 MG/ML
INJECTION, SOLUTION INTRAVENOUS PRN
Status: DISCONTINUED | OUTPATIENT
Start: 2023-05-30 | End: 2023-05-30

## 2023-05-30 RX ORDER — ACETAMINOPHEN 325 MG/1
975 TABLET ORAL EVERY 8 HOURS
Status: DISCONTINUED | OUTPATIENT
Start: 2023-05-30 | End: 2023-05-31 | Stop reason: HOSPADM

## 2023-05-30 RX ORDER — GABAPENTIN 300 MG/1
300 CAPSULE ORAL
Status: COMPLETED | OUTPATIENT
Start: 2023-05-30 | End: 2023-05-30

## 2023-05-30 RX ORDER — PROPOFOL 10 MG/ML
INJECTION, EMULSION INTRAVENOUS CONTINUOUS PRN
Status: DISCONTINUED | OUTPATIENT
Start: 2023-05-30 | End: 2023-05-30

## 2023-05-30 RX ORDER — NALOXONE HYDROCHLORIDE 0.4 MG/ML
0.4 INJECTION, SOLUTION INTRAMUSCULAR; INTRAVENOUS; SUBCUTANEOUS
Status: DISCONTINUED | OUTPATIENT
Start: 2023-05-30 | End: 2023-05-31 | Stop reason: HOSPADM

## 2023-05-30 RX ORDER — ONDANSETRON 4 MG/1
4 TABLET, ORALLY DISINTEGRATING ORAL EVERY 6 HOURS PRN
Status: DISCONTINUED | OUTPATIENT
Start: 2023-05-30 | End: 2023-05-31 | Stop reason: HOSPADM

## 2023-05-30 RX ORDER — AMOXICILLIN 250 MG
1 CAPSULE ORAL 2 TIMES DAILY
Status: DISCONTINUED | OUTPATIENT
Start: 2023-05-30 | End: 2023-05-31 | Stop reason: HOSPADM

## 2023-05-30 RX ORDER — HYDROMORPHONE HCL IN WATER/PF 6 MG/30 ML
0.2 PATIENT CONTROLLED ANALGESIA SYRINGE INTRAVENOUS
Status: DISCONTINUED | OUTPATIENT
Start: 2023-05-30 | End: 2023-05-31 | Stop reason: HOSPADM

## 2023-05-30 RX ORDER — CEFAZOLIN SODIUM 1 G/3ML
1 INJECTION, POWDER, FOR SOLUTION INTRAMUSCULAR; INTRAVENOUS EVERY 8 HOURS
Status: COMPLETED | OUTPATIENT
Start: 2023-05-30 | End: 2023-05-31

## 2023-05-30 RX ORDER — NALOXONE HYDROCHLORIDE 0.4 MG/ML
0.2 INJECTION, SOLUTION INTRAMUSCULAR; INTRAVENOUS; SUBCUTANEOUS
Status: DISCONTINUED | OUTPATIENT
Start: 2023-05-30 | End: 2023-05-31 | Stop reason: HOSPADM

## 2023-05-30 RX ORDER — OXYCODONE HYDROCHLORIDE 5 MG/1
10 TABLET ORAL EVERY 4 HOURS PRN
Status: DISCONTINUED | OUTPATIENT
Start: 2023-05-30 | End: 2023-05-31 | Stop reason: HOSPADM

## 2023-05-30 RX ORDER — AMLODIPINE BESYLATE 5 MG/1
5 TABLET ORAL DAILY
Status: DISCONTINUED | OUTPATIENT
Start: 2023-05-31 | End: 2023-05-31 | Stop reason: HOSPADM

## 2023-05-30 RX ORDER — VANCOMYCIN HYDROCHLORIDE 1 G/20ML
INJECTION, POWDER, LYOPHILIZED, FOR SOLUTION INTRAVENOUS PRN
Status: DISCONTINUED | OUTPATIENT
Start: 2023-05-30 | End: 2023-05-30 | Stop reason: HOSPADM

## 2023-05-30 RX ORDER — LIDOCAINE 40 MG/G
CREAM TOPICAL
Status: DISCONTINUED | OUTPATIENT
Start: 2023-05-30 | End: 2023-05-31 | Stop reason: HOSPADM

## 2023-05-30 RX ORDER — HYDROMORPHONE HCL IN WATER/PF 6 MG/30 ML
0.4 PATIENT CONTROLLED ANALGESIA SYRINGE INTRAVENOUS
Status: DISCONTINUED | OUTPATIENT
Start: 2023-05-30 | End: 2023-05-31 | Stop reason: HOSPADM

## 2023-05-30 RX ORDER — LIDOCAINE HCL/EPINEPHRINE/PF 2%-1:200K
VIAL (ML) INJECTION PRN
Status: DISCONTINUED | OUTPATIENT
Start: 2023-05-30 | End: 2023-05-30

## 2023-05-30 RX ORDER — ACETAMINOPHEN 325 MG/1
650 TABLET ORAL EVERY 4 HOURS PRN
Status: DISCONTINUED | OUTPATIENT
Start: 2023-06-02 | End: 2023-05-31 | Stop reason: HOSPADM

## 2023-05-30 RX ORDER — ONDANSETRON 2 MG/ML
4 INJECTION INTRAMUSCULAR; INTRAVENOUS EVERY 6 HOURS PRN
Status: DISCONTINUED | OUTPATIENT
Start: 2023-05-30 | End: 2023-05-31 | Stop reason: HOSPADM

## 2023-05-30 RX ORDER — OXYCODONE HYDROCHLORIDE 5 MG/1
5 TABLET ORAL EVERY 4 HOURS PRN
Status: DISCONTINUED | OUTPATIENT
Start: 2023-05-30 | End: 2023-05-31 | Stop reason: HOSPADM

## 2023-05-30 RX ORDER — ALBUTEROL SULFATE 0.83 MG/ML
2.5 SOLUTION RESPIRATORY (INHALATION) EVERY 4 HOURS PRN
Status: DISCONTINUED | OUTPATIENT
Start: 2023-05-30 | End: 2023-05-30 | Stop reason: HOSPADM

## 2023-05-30 RX ORDER — DEXAMETHASONE SODIUM PHOSPHATE 4 MG/ML
INJECTION, SOLUTION INTRA-ARTICULAR; INTRALESIONAL; INTRAMUSCULAR; INTRAVENOUS; SOFT TISSUE PRN
Status: DISCONTINUED | OUTPATIENT
Start: 2023-05-30 | End: 2023-05-30

## 2023-05-30 RX ORDER — CEFAZOLIN SODIUM/WATER 2 G/20 ML
2 SYRINGE (ML) INTRAVENOUS
Status: COMPLETED | OUTPATIENT
Start: 2023-05-30 | End: 2023-05-30

## 2023-05-30 RX ORDER — EPHEDRINE SULFATE 50 MG/ML
INJECTION, SOLUTION INTRAMUSCULAR; INTRAVENOUS; SUBCUTANEOUS PRN
Status: DISCONTINUED | OUTPATIENT
Start: 2023-05-30 | End: 2023-05-30

## 2023-05-30 RX ORDER — HYDROMORPHONE HCL IN WATER/PF 6 MG/30 ML
0.4 PATIENT CONTROLLED ANALGESIA SYRINGE INTRAVENOUS EVERY 5 MIN PRN
Status: DISCONTINUED | OUTPATIENT
Start: 2023-05-30 | End: 2023-05-30 | Stop reason: HOSPADM

## 2023-05-30 RX ORDER — FENTANYL CITRATE 50 UG/ML
50 INJECTION, SOLUTION INTRAMUSCULAR; INTRAVENOUS EVERY 5 MIN PRN
Status: DISCONTINUED | OUTPATIENT
Start: 2023-05-30 | End: 2023-05-30 | Stop reason: HOSPADM

## 2023-05-30 RX ORDER — ONDANSETRON 2 MG/ML
4 INJECTION INTRAMUSCULAR; INTRAVENOUS EVERY 30 MIN PRN
Status: DISCONTINUED | OUTPATIENT
Start: 2023-05-30 | End: 2023-05-30 | Stop reason: HOSPADM

## 2023-05-30 RX ORDER — BISACODYL 10 MG
10 SUPPOSITORY, RECTAL RECTAL DAILY PRN
Status: DISCONTINUED | OUTPATIENT
Start: 2023-05-30 | End: 2023-05-31 | Stop reason: HOSPADM

## 2023-05-30 RX ORDER — TRANEXAMIC ACID 650 MG/1
1950 TABLET ORAL ONCE
Status: COMPLETED | OUTPATIENT
Start: 2023-05-30 | End: 2023-05-30

## 2023-05-30 RX ORDER — ROPIVACAINE HYDROCHLORIDE 5 MG/ML
INJECTION, SOLUTION EPIDURAL; INFILTRATION; PERINEURAL PRN
Status: DISCONTINUED | OUTPATIENT
Start: 2023-05-30 | End: 2023-05-30

## 2023-05-30 RX ORDER — ASPIRIN 325 MG
325 TABLET, DELAYED RELEASE (ENTERIC COATED) ORAL DAILY
Status: DISCONTINUED | OUTPATIENT
Start: 2023-05-31 | End: 2023-05-31 | Stop reason: HOSPADM

## 2023-05-30 RX ORDER — PROCHLORPERAZINE MALEATE 5 MG
10 TABLET ORAL EVERY 6 HOURS PRN
Status: DISCONTINUED | OUTPATIENT
Start: 2023-05-30 | End: 2023-05-31 | Stop reason: HOSPADM

## 2023-05-30 RX ORDER — LIDOCAINE 40 MG/G
CREAM TOPICAL
Status: DISCONTINUED | OUTPATIENT
Start: 2023-05-30 | End: 2023-05-30 | Stop reason: HOSPADM

## 2023-05-30 RX ORDER — CEFAZOLIN SODIUM/WATER 2 G/20 ML
2 SYRINGE (ML) INTRAVENOUS SEE ADMIN INSTRUCTIONS
Status: DISCONTINUED | OUTPATIENT
Start: 2023-05-30 | End: 2023-05-30 | Stop reason: HOSPADM

## 2023-05-30 RX ORDER — KETAMINE HYDROCHLORIDE 10 MG/ML
INJECTION INTRAMUSCULAR; INTRAVENOUS PRN
Status: DISCONTINUED | OUTPATIENT
Start: 2023-05-30 | End: 2023-05-30

## 2023-05-30 RX ORDER — ONDANSETRON 4 MG/1
4 TABLET, ORALLY DISINTEGRATING ORAL EVERY 30 MIN PRN
Status: DISCONTINUED | OUTPATIENT
Start: 2023-05-30 | End: 2023-05-30 | Stop reason: HOSPADM

## 2023-05-30 RX ORDER — BUPIVACAINE HYDROCHLORIDE 7.5 MG/ML
INJECTION, SOLUTION INTRASPINAL
Status: COMPLETED | OUTPATIENT
Start: 2023-05-30 | End: 2023-05-30

## 2023-05-30 RX ORDER — FENTANYL CITRATE 50 UG/ML
INJECTION, SOLUTION INTRAMUSCULAR; INTRAVENOUS PRN
Status: DISCONTINUED | OUTPATIENT
Start: 2023-05-30 | End: 2023-05-30

## 2023-05-30 RX ORDER — DEXAMETHASONE SODIUM PHOSPHATE 10 MG/ML
INJECTION, SOLUTION INTRAMUSCULAR; INTRAVENOUS
Status: DISCONTINUED | OUTPATIENT
Start: 2023-05-30 | End: 2023-05-30

## 2023-05-30 RX ADMIN — SODIUM CHLORIDE, POTASSIUM CHLORIDE, SODIUM LACTATE AND CALCIUM CHLORIDE: 600; 310; 30; 20 INJECTION, SOLUTION INTRAVENOUS at 16:50

## 2023-05-30 RX ADMIN — LIDOCAINE HYDROCHLORIDE 0.5 ML: 10 INJECTION, SOLUTION EPIDURAL; INFILTRATION; INTRACAUDAL; PERINEURAL at 09:49

## 2023-05-30 RX ADMIN — ONDANSETRON 4 MG: 2 INJECTION INTRAMUSCULAR; INTRAVENOUS at 14:50

## 2023-05-30 RX ADMIN — TRANEXAMIC ACID 1950 MG: 650 TABLET ORAL at 09:30

## 2023-05-30 RX ADMIN — MIDAZOLAM 2 MG: 1 INJECTION INTRAMUSCULAR; INTRAVENOUS at 10:38

## 2023-05-30 RX ADMIN — ROPIVACAINE HYDROCHLORIDE 30 ML: 5 INJECTION, SOLUTION EPIDURAL; INFILTRATION; PERINEURAL at 10:43

## 2023-05-30 RX ADMIN — SODIUM CHLORIDE, POTASSIUM CHLORIDE, SODIUM LACTATE AND CALCIUM CHLORIDE: 600; 310; 30; 20 INJECTION, SOLUTION INTRAVENOUS at 12:25

## 2023-05-30 RX ADMIN — KETAMINE HYDROCHLORIDE 50 MG: 10 INJECTION, SOLUTION INTRAMUSCULAR; INTRAVENOUS at 11:14

## 2023-05-30 RX ADMIN — HYDROXYZINE HYDROCHLORIDE 25 MG: 25 TABLET, FILM COATED ORAL at 19:20

## 2023-05-30 RX ADMIN — ACETAMINOPHEN 975 MG: 325 TABLET, FILM COATED ORAL at 17:11

## 2023-05-30 RX ADMIN — GABAPENTIN 300 MG: 300 CAPSULE ORAL at 09:30

## 2023-05-30 RX ADMIN — Medication 2 G: at 11:26

## 2023-05-30 RX ADMIN — BUPIVACAINE HYDROCHLORIDE IN DEXTROSE 2 ML: 7.5 INJECTION, SOLUTION SUBARACHNOID at 11:15

## 2023-05-30 RX ADMIN — MAGNESIUM SULFATE HEPTAHYDRATE 2 G: 40 INJECTION, SOLUTION INTRAVENOUS at 14:24

## 2023-05-30 RX ADMIN — ACETAMINOPHEN 975 MG: 325 TABLET, FILM COATED ORAL at 09:30

## 2023-05-30 RX ADMIN — CEFAZOLIN 1 G: 1 INJECTION, POWDER, FOR SOLUTION INTRAMUSCULAR; INTRAVENOUS at 19:19

## 2023-05-30 RX ADMIN — LIDOCAINE HYDROCHLORIDE,EPINEPHRINE BITARTRATE 5 ML: 20; .005 INJECTION, SOLUTION EPIDURAL; INFILTRATION; INTRACAUDAL; PERINEURAL at 10:40

## 2023-05-30 RX ADMIN — OXYCODONE HYDROCHLORIDE 5 MG: 5 TABLET ORAL at 17:11

## 2023-05-30 RX ADMIN — DEXAMETHASONE SODIUM PHOSPHATE 4 MG: 4 INJECTION, SOLUTION INTRA-ARTICULAR; INTRALESIONAL; INTRAMUSCULAR; INTRAVENOUS; SOFT TISSUE at 10:43

## 2023-05-30 RX ADMIN — FENTANYL CITRATE 100 MCG: 50 INJECTION, SOLUTION INTRAMUSCULAR; INTRAVENOUS at 10:39

## 2023-05-30 RX ADMIN — SENNOSIDES AND DOCUSATE SODIUM 1 TABLET: 50; 8.6 TABLET ORAL at 19:19

## 2023-05-30 RX ADMIN — PROPOFOL 75 MCG/KG/MIN: 10 INJECTION, EMULSION INTRAVENOUS at 11:23

## 2023-05-30 RX ADMIN — Medication 5 MG: at 12:45

## 2023-05-30 RX ADMIN — OXYCODONE HYDROCHLORIDE 5 MG: 5 TABLET ORAL at 22:14

## 2023-05-30 RX ADMIN — SODIUM CHLORIDE, POTASSIUM CHLORIDE, SODIUM LACTATE AND CALCIUM CHLORIDE: 600; 310; 30; 20 INJECTION, SOLUTION INTRAVENOUS at 09:49

## 2023-05-30 RX ADMIN — Medication 5 MG: at 12:25

## 2023-05-30 RX ADMIN — SODIUM CHLORIDE, POTASSIUM CHLORIDE, SODIUM LACTATE AND CALCIUM CHLORIDE 500 ML: 600; 310; 30; 20 INJECTION, SOLUTION INTRAVENOUS at 20:34

## 2023-05-30 RX ADMIN — Medication 5 MG: at 12:17

## 2023-05-30 RX ADMIN — Medication 10 MG: at 11:57

## 2023-05-30 ASSESSMENT — ACTIVITIES OF DAILY LIVING (ADL)
ADLS_ACUITY_SCORE: 22
ADLS_ACUITY_SCORE: 24
ADLS_ACUITY_SCORE: 20
HEARING_DIFFICULTY_OR_DEAF: NO
ADLS_ACUITY_SCORE: 24
ADLS_ACUITY_SCORE: 22
FALL_HISTORY_WITHIN_LAST_SIX_MONTHS: NO
CHANGE_IN_FUNCTIONAL_STATUS_SINCE_ONSET_OF_CURRENT_ILLNESS/INJURY: NO
ADLS_ACUITY_SCORE: 22
DOING_ERRANDS_INDEPENDENTLY_DIFFICULTY: NO
EQUIPMENT_CURRENTLY_USED_AT_HOME: CANE, STRAIGHT

## 2023-05-30 NOTE — ANESTHESIA POSTPROCEDURE EVALUATION
Patient: Case Epstein    Procedure: Procedure(s):  Right ARTHROPLASTY HIP TOTAL DIRECT ANTERIOR APPROACH       Anesthesia Type:  Spinal    Note:  Disposition: Outpatient   Postop Pain Control: Uneventful            Sign Out: Well controlled pain   PONV: No   Neuro/Psych: Uneventful            Sign Out: Acceptable/Baseline neuro status   Airway/Respiratory: Uneventful            Sign Out: Acceptable/Baseline resp. status   CV/Hemodynamics: Uneventful            Sign Out: Acceptable CV status; No obvious hypovolemia; No obvious fluid overload   Other NRE: NONE   DID A NON-ROUTINE EVENT OCCUR? No           Last vitals:  Vitals Value Taken Time   /70 05/30/23 1530   Temp 36.6  C (97.9  F) 05/30/23 1510   Pulse 98 05/30/23 1543   Resp 12 05/30/23 1543   SpO2 98 % 05/30/23 1543   Vitals shown include unvalidated device data.    Electronically Signed By: NATTY Tucker CRNA  May 30, 2023  3:44 PM

## 2023-05-30 NOTE — ANESTHESIA PROCEDURE NOTES
Femoral (PENG Block) Procedure Note    Pre-Procedure   Staff -        CRNA: Shahla Candelario APRN CRNA       Other Anesthesia Staff: Christin Espinosa APRN CRNA       Performed By: CRNA       Location: pre-op       Pre-Anesthestic Checklist: patient identified, IV checked, site marked, risks and benefits discussed, informed consent, monitors and equipment checked, pre-op evaluation, at physician/surgeon's request and post-op pain management  Timeout:       Correct Patient: Yes        Correct Procedure: Yes        Correct Site: Yes        Correct Position: Yes        Correct Laterality: Yes        Site Marked: Yes  Procedure Documentation  Procedure: Femoral (PENG Block)       Diagnosis: GUSTAVO       Laterality: right       Patient Position: supine       Patient Prep/Sterile Barriers: sterile gloves, mask, patient draped       Skin prep: Chloraprep       Local skin infiltrated with 1 mL of 1% lidocaine.        Needle Type: insulated       Needle Gauge: 22.        Needle Length (millimeters): 100        Ultrasound guided       1. Ultrasound was used to identify targeted nerve, plexus, vascular marker, or fascial plane and place a needle adjacent to it in real-time.       2. Ultrasound was used to visualize the spread of anesthetic in close proximity to the above referenced structure.       3. A permanent image is entered into the patient's record.       4. The visualized anatomic structures appeared normal.       5. There were no apparent abnormal pathologic findings.    Assessment/Narrative         The placement was negative for: blood aspirated, painful injection and site bleeding       Paresthesias: No.       Test dose of 5 mL lidocaine 2% w/ 1:200,000 epinephrine at 10:40 CDT.         Test dose negative, 3 minutes after injection, for signs of intravascular, subdural, or intrathecal injection.       Bolus given via needle. no blood aspirated via catheter.        Secured via.        Insertion/Infusion Method:  "Single Shot       Complications: none       Injection made incrementally with aspirations every 5 mL.      FOR [facility] ONLY:   Pain Team Contact information: please page the Pain Team Via iHandle. Search \"Pain\". During daytime hours, please page the attending first. At night please page the resident first.      "

## 2023-05-30 NOTE — OP NOTE
DATE OF SERVICE:  5/30/2023      PREOPERATIVE DIAGNOSIS:  Primary Osteoarthritis  - right Hip    POSTOPERATIVE DIAGNOSIS:  Primary Osteoarthritis  - right Hip    OPERATION PERFORMED:  Total Hip Arthroplasty, Press Fit, right Hip, Direct anterior Approach.    ATTENDING SURGEON(S):  Dennis Randle M.D., M.S.    ASSISTANT(S): Tano Mancia PA-C   The PA's assistance was medically necessary given the technical complexity of the case; with assistance with patient positioning, retraction and hip joint exposure, limb manipulation with initial dislocation of the native hip, femoral osteotomy, assistance with implant placement, trial and final arthroplasty hip implant reduction, and wound closure.      ANESTHESIA: Spinal with MAC; in the supine position on the [place] surgical table.    ESTIMATED BLOOD LOSS:  300mL .    FLUIDS:  1300 mL LR    UO: no calero.    ANTIBIOTICS:  cefazolin, 2gm IVPB prior to incision and repeat 2gm iv at closure.    Tranexamic Acid: 1950mg oral prior to procedure    IMPLANTS / GRAFTS:    DePuy 56mm [place] Gription acetabular shell with a +4 neutral [place] Altrx polyethylene liner  Size 14 DePuy Corail collarted femoral stem, std offset (*cemented*)  +8.5, 36 mm Biolox delta ceramic femoral head.  [place] hole eliminator      DRAINS:  None.    COMPLICATIONS:  None.     SPECIMENS: none      FINDINGS: advanced hip osteoarthritis, eburnated femoral head with marginal osteophytes. Severe deformity of the femoral head. Acetabular dysplasia. Acetabulum with impinging marginal osteophytes.    INDICATIONS FOR PROCEDURE: Case Epstein is a pleasant 63 year old male who has had ongoing significant pain in the right hip. Xrays with advanced hip degenerative arthrosis with loss of joint space and marginal osteophytes. The risk/benefit analysis of the above right total hip arthroplasty was explained. Alternate bearing technology and the additional risks of a press fit hip were explained, including fracture. Risks  discussed to include, but not be limited to: wear, loosening, infection, bleeding, pain, scar, thromboembolic disease, neurovascular damage with temporary or permanent nerve damage, limb length inequality, implant failure, implant dislocation, tiffanie-prosthetic fracture, need for further surgery, risks of anesthesia and death.  Despite these risks, the patient elected to proceed and, with the patient's permission, was taken to the operating room.       DESCRIPTION OF PROCEDURE:  The patient was identified in the preoperative holding area.  After confirming with the patient the correct procedure and procedure site, the right hip was marked with an indelible marker by myself, the attending surgeon.  After again reviewing the risks and perceived benefits of surgery, questions were addressed, he elected to proceed and written informed consent was obtained and reviewed.     The patient was then taken to the operating room and placed supine on the operating surgical [place] table.  After adequate anesthesia, a Monk catheter was placed.  The lower extremities were placed in the boots and suspended.  The arms were well positioned and padded to be comfortable.  The right lower extremity was then prepped and draped in the usual sterile fashion.     A timeout was then performed confirming the correct patient, procedure, procedure site, availability of instruments and implants, the administration of prophylactic antibiotics as well as a review of the patient's allergies by all surgical staff.    At that time that an anterior based incision was made approximately two fingerbreadths lateral (3cm) and two fingerbreadths distal to the ASIS in an oblique fashion extending posterior-distally towards the anterior aspect of the femur approximately 10-12 cm in length, sharply through skin.  We then used electrocautery through the subcutaneous tissues, performing hemostasis.  Perforating vessels were cauterized. The fascia over the tensor  "was then incised longitudinally.  I then proceeded to dissect the tensor muscle off the  undersurface of the fascia anteriorly and medially, exposing the underlying tissues and capsule as well as the rectus femoris medially.  A retractor was placed laterally over the neck as well as inferiorly over the femoral neck and anteriorly over the acetabulum rim deep to the rectus.  We then proceeded to use a Travon retractor laterally and a vascular bundle of the circumflex vessels was identified.  Using the Aquamantys, these vessels were then cauterized.  Good exposure of the anterior aspect  of the femoral neck capsule was obtained.  I then proceeded to make a capsulotomy an inverted \"T\" fashion, starting at the superolateral aspect of the acetabulum and femoral head, along the superior aspect of the femoral neck down to the intertrochanteric ridge and then distally in an oblique fashion along the intertrochanteric ridge towards the lesser trochanter.  This anterior capsule was then tagged. I also tagged the superolateral capsule at the saddle of the trochanter and gently released this as well.  This exposed the underlying femoral head and neck. There were prominent marginal osteophytes. The retractors were then placed intracapsular.  Further  capsular releases were performed inferiorly along the medial calcar to get to the base of the lesser trochanter as well as laterally into the trochanteric saddle.     I then used a C-arm to get positioning with making the femoral neck cut.  After determining adequate cut to the templated length, a first cut was made at the intended final cut.  Care was taken to avoid the posterior hanging greater trochanter.  I then proceeded to make another cut medially at the base of the femoral head, to make a \"napkin\" ring, to aid in removal of the femoral head.  Using a corkscrew into the femoral head, using a twisting motion,  the femoral head was then removed in line with the fibers of the " tensor muscle with care taken to protect the tensor.there was severe deformity of the femoral head.  The wound was then irrigated.  I then proceeded to place a retractor anteriorly over the anterior/inferior acetabulum, inferiorly over the transverse acetabular ligament as well as posteriorly within the capsule, giving good exposure to the acetabulum.  I then proceeded using either a long-handled knife or electrocautery to remove labral tissue as well as the  pulvinar at the acetabular floor. Overhanging osteophytes were removed.  There was significant medial acetabular osteophyte.  Once the soft tissue was cleared out, I then proceeded to ream.  Using a 53 mm reamer and using a C-arm, the acetabulum was medialized to the teardrop, removing the medial osteophyte.  I then sequentially increased reamer sizes by 2 mm, up to a 55, maintaining correct abduction and version, which was confirmed with the C-arm.  We trialed a size 55 cup.  This  was a very good and tight fit.  I then proceeded to place the final acetabular shell using the C-arm to guide the abduction angle as well as anteversion, which was approximately 22 degrees.  Once this was confirmed, the final acetabular polyethylene liner was placed.  The wound was irrigated.     I then proceeded to the femur.  A hook retractor was placed along the lateral aspect of the trochanter just distal to the vastus tubercle. Using my hand, I pulled the femur laterally taking care that it was not caught under the acetabulum. We then proceeded to lower the limb down to the floor and slightly adduct, externally rotating the foot to 120 degrees (femur about 90 degrees), giving good femoral exposure.  A retractor was placed medially along the calcar.  I then proceeded to release the lateral capsule along the inner aspect of the greater trochanter, taking care of  the underlying rotators; these tendons were exposed and kept intact.  This gave better exposure of the femur. A  shepards hook was placed over the tip of the greater trochanter.  I then proceeded to connect the femoral hook to the lift and using my hand to lift the femur, proceeded to elevate the lift until there was firm pressure against my hand.  This gave us adequate exposure of the femur for broaching.  A cookie cutter was placed laterally to remove some of the remnant of the lateral neck as well as a  rongeur.  A canal finding awl was placed down the femur.  I then proceeded to broach, first starting with the chili pepper broach followed by sequential broaching up to the templated size.  This provided a good fit, so it seemed.  This was calcar planed.  The wound was irrigated.  I then proceeded to do a trial reduction,  Starting off with a +1.5 femoral head, releasing the femoral hook and removing the retractors and bringing the leg back up to a neutral position,  the hip was easily reduced.  Using C-arm, we assess length using the overlay technique with printed images.  Once the final length was determined trialing varius head sizes using the overlay technique, we proceed to dislocate the trial and proceed with final Actis femoral stem placement. The final femoral stem was then placed.  This provided good fixation on the femoral component.  I then proceeded to trial once again the previous trialed femoral head and this was reduced, with final images showing good length. It appeared to be a good fit and stable.  This trial was then dislocated and the  final ceramic femoral head was placed.  however, upon testing this, the femoral component was indeed loose.    The wound was copiously irrigated. At that time, the decision was made based on sizing, and no room distally, that it didn't seem that I could really go up on femoral size. It appeared to be such a large metaphysis mismatch to get a good fit. I then decided to proceed with cementing. The only femoral component available was the corail. I sized the corail broach  distally to a size 16. So I went with a size 14 final stem to allow a good cement mantle. I then proceeded to place a distal cement stop distally, followed by cement after cleaning out the canal copiously with irrigation. the stem was placed into good version and laterally into valgus. Once hardened, I then sized femoral heads up to an 8.5. this brought us out to length. The final femoral head was placed. The hip was then reduced.  It was taken through a range of motion including flexion, extension, internal and external rotation and there was good stability without dislocation concerns.  The wound was then copiously irrigated starting with a liter of dilute Betadine followed by three liters of antibiotic normal saline.  A second dose of Ancef was administered.  The  capsule was repaired side-to-side with 0 Ethibond.  One gram of vancomycin powder was placed deep in the wound.  The fascia over the tensor was closed side-to-side with #1 Vicryl followed by a 0 Stratafix.  The subcutaneous and dermal tissues were then approximated with 0 Vicryl and 3-0 Monocryl.  3-0 Monocryl subcuticular suture was used to maintain good skin eversion and apposition with Dermabond to seal the skin edges.  A sterile waterproof  dressing was applied over the incision.  The patient was then awakened and extubated and taken to the recovery room without difficulty in stable condition.  There were no apparent complications.       A postoperative pelvis x-ray will be obtained in recovery.      The postoperative plan will be to weight bear as tolerated.  No hip precautions.  Twenty-three hours of IV antibiotics.  Anticoagulation will proceed for four weeks postoperative and will include daily 325 mg aspirin.     There were no apparent complications.        Dennis Randle M.D., M.S.  Dept. of Orthopaedic Surgery  [facility]

## 2023-05-30 NOTE — ANESTHESIA CARE TRANSFER NOTE
Patient: Case Epstein    Procedure: Procedure(s):  Right ARTHROPLASTY HIP TOTAL DIRECT ANTERIOR APPROACH       Diagnosis: Primary osteoarthritis of right hip [M16.11]  Diagnosis Additional Information: No value filed.    Anesthesia Type:   Spinal     Note:    Oropharynx: spontaneously breathing  Level of Consciousness: drowsy  Oxygen Supplementation: face mask  Level of Supplemental Oxygen (L/min / FiO2): 6  Independent Airway: airway patency satisfactory and stable  Dentition: dentition unchanged  Vital Signs Stable: post-procedure vital signs reviewed and stable  Report to RN Given: handoff report given  Patient transferred to: PACU    Handoff Report: Identifed the Patient, Identified the Reponsible Provider, Reviewed the pertinent medical history, Discussed the surgical course, Reviewed Intra-OP anesthesia mangement and issues during anesthesia, Set expectations for post-procedure period and Allowed opportunity for questions and acknowledgement of understanding      Vitals:  Vitals Value Taken Time   /79 05/30/23 1509   Temp     Pulse 100 05/30/23 1511   Resp 21 05/30/23 1511   SpO2 98 % 05/30/23 1511   Vitals shown include unvalidated device data.    Electronically Signed By: NATTY Tucker CRNA  May 30, 2023  3:12 PM

## 2023-05-30 NOTE — H&P
"The History and Physical on patient's chart was personally reviewed today with the patient. there have been no interval changes in patient's history since H+P performed.    History:  Case Epstein is a 63 year old male seen for evaluation of ongoing right hip pain with no known injury. Has known endstage right hip osteoarthritis.  Pain has been present since 8/2020. He notes one day he bent down to put on his sock on the right foot and couldn't bend all the way down. Locates pain to the groin area. Pain is worse with walking, moving. Ok at rest sitting, not bad at night, once in a while. Difficulties getting in/out of car, donning shoes/socks. Thinks a little shorter on the right compared to the left, but not for sure, seems to have improved since his right knee replacement. Takes Aleve or ibuprofen for pain. Left hip is hurting as well, not as bad.     Denies prior right hip problems until 8/2020. Denies low back pain, numbness and tingling.     He is status post right total knee arthroplasty 5/2021, doing well.     History left ankle fusion. Also some problems/pain with left shoulder.    AP pelvis and AP/Lateral views right hip from 5/3/2023 -- No obvious fractures or dislocations.  Progression to severe right hip degenerative changes with loss of superior joint space with adjacent subchondral sclerosis of the acetabulum, subchondral acetabular cysts. There is been lateralization of the femoral head relative to the acetabulum, with flattening of the femoral head with femoral head sclerosis. Large marginal osteophytes. severe left hip degenerative changes with loss of superolateral joint space to bone on bone. Bilateral pincer deformity. Bilateral coxa valga.         Impression: 62yo male with chronic right hip pain, advanced \"end stage\" right hip primary osteoarthritis        Plan:        * discussed pain in groin/hip likely from advanced hip arthritis. This is wearing of the cartilage within the hip joint " "either due to normal \"wear and tear\" or following an injury. Any low back / buttock / radiating pain likely coming from the low back.    Left hip is not far behind the right. Both hips have really progress since 2021.    *  treatment options for hip arthritis and pain include: do nothing, NSAIDS, activity modification, Physical Therapy, injections, total hip arthroplasty. Risks and benefits of each discussed.     * did discuss patient is a candidate for total hip arthroplasty, and offered total hip arthroplasty to patient. Total hip arthroplasty will only attempt to provide pain relief from hip related arthritis only and not any pain from the low back. Any low back pain likely to continue.    *  Discussed risks of surgery include, but not limited to: bleeding, infection, pain, scar, damage to adjacent structures (e.g. Nerves, blood vessels, bone, cartilage), temporary or permanent nerve damage, recurrence of symptoms, implant dislocation, implant failure, implant infection, unequal limb lengths, stiffness, need for further surgery, blood clots, pulmonary embolism, risks of anesthesia, and death.         * understanding the risks of surgery, as a quality of life decision, patient would like to proceed with surgery: RIGHT total hip arthroplasty.    Patient elects to proceed with planned procedure. Right total hip arthroplasty.    Risks and perceived benefits of surgery again discussed with patient. Patient's questions addressed and answered. Written informed consent obtained and reviewed. Surgical site marked with indelible marker with patient's participation after confirming site with patient.      Dennis Randle M.D., M.S.  Dept. of Orthopaedic Surgery  [facility]    "

## 2023-05-30 NOTE — MEDICATION SCRIBE - ADMISSION MEDICATION HISTORY
Medication Scribe Admission Medication History    Admission medication history is complete. The information provided in this note is only as accurate as the sources available at the time of the update.    Medication reconciliation/reorder completed by provider prior to medication history? No    Information Source(s): Patient via in-person    Pertinent Information: None    Changes made to PTA medication list:    Added: None    Deleted: None    Changed: None    Medication Affordability:  Not including over the counter (OTC) medications, was there a time in the past 3 months when you did not take your medications as prescribed because of cost?: No    Allergies reviewed with patient and updates made in EHR: yes    Medication History Completed By: Kareen Chung 5/30/2023 9:27 AM    Prior to Admission medications    Medication Sig Last Dose Taking? Auth Provider Long Term End Date   acetaminophen (TYLENOL) 500 MG tablet Take 500-1,000 mg by mouth every 6 hours as needed for pain Past Week at unknown Yes Reported, Patient     amLODIPine (NORVASC) 5 MG tablet Take 1 tablet (5 mg) by mouth daily 5/30/2023 at am Yes Deal, Tushar ZIEGLER MD Yes    ibuprofen (ADVIL/MOTRIN) 200 MG tablet Take 200 mg by mouth every 4 hours as needed for mild pain More than a month at unknown Yes Reported, Patient     naproxen sodium 220 MG capsule Take 220 mg by mouth 2 times daily (with meals) More than a month at unknown Yes Reported, Patient

## 2023-05-30 NOTE — PROGRESS NOTES
"[place] [facility] ADMISSION NOTE    Patient admitted to room 2304 at approximately 1630 via cart from surgery. Patient was accompanied by other:friend Liseth.     Verbal SBAR report received from Anil prior to patient arrival.     Patient trasferred to bed via air devendra. Patient alert and oriented X 3. Pain is controlled with current analgesics.  Medication(s) being used: acetaminophen.  . Admission vital signs: Blood pressure 121/79, pulse 98, temperature (!) 96.3  F (35.7  C), temperature source Axillary, resp. rate 18, height 1.753 m (5' 9\"), weight 79.4 kg (175 lb), SpO2 99 %. Patient was oriented to plan of care, call light, bed controls, tv, telephone, bathroom and visiting hours.     Risk Assessment    The following safety risks were identified during admission: fall. Yellow risk band applied: YES.     Skin Initial Assessment    This writer admitted this patient and completed a full skin assessment and Jae score in the Adult PCS flowsheet. Appropriate interventions initiated as needed.     Secondary skin check completed by Alla Rowe Risk Assessment  Sensory Perception: 3-->slightly limited  Moisture: 4-->rarely moist  Activity: 3-->walks occasionally  Mobility: 3-->slightly limited  Nutrition: 3-->adequate  Friction and Shear: 3-->no apparent problem  Jae Score: 19  Mattress: Standard gel/foam mattress (IsoFlex, Atmos Air, etc.)  Bed Frame: Standard width and length    Education    Patient has a [place] to Observation order: No  Observation education completed and documented: N/A      Violeta Reinoso RN    "

## 2023-05-30 NOTE — PROGRESS NOTES
Upon arrival to floor post operatively, Sepsis BPA fired.  BP, HR, RR and oxygen sats were WNL and stable. Axillary temp 96.3.  Patient states this is his baseline temp, warm to touch.   Lactic ordered and frequent vitals obtained per protocol.  Lactic resulted 2.2, hospitalist notified and updated  No further orders given - patient stable and recent surgery. Antibiotics previously ordered.

## 2023-05-30 NOTE — INTERVAL H&P NOTE
"I have reviewed the surgical (or preoperative) H&P that is linked to this encounter, and examined the patient. There are no significant changes    Clinical Conditions Present on Arrival:  Clinically Significant Risk Factors Present on Admission                  # Overweight: Estimated body mass index is 25.84 kg/m  as calculated from the following:    Height as of 5/22/23: 1.753 m (5' 9\").    Weight as of 5/22/23: 79.4 kg (175 lb).       "

## 2023-05-30 NOTE — ANESTHESIA PROCEDURE NOTES
"Intrathecal injection Procedure Note    Pre-Procedure   Staff -        CRNA: Christin Espinosa APRN CRNA       Performed By: CRNA       Pre-Anesthestic Checklist: patient identified, IV checked, risks and benefits discussed, informed consent, monitors and equipment checked, pre-op evaluation, at physician/surgeon's request and post-op pain management  Timeout:       Correct Patient: Yes        Correct Procedure: Yes        Correct Site: Yes        Correct Position: Yes   Procedure Documentation  Procedure: intrathecal injection       Patient Position: sitting       Skin prep: Chloraprep       Insertion Site: L3-4. (midline approach).       Needle Gauge: 25.        Needle Length (Inches): 3.5        Spinal Needle Type: Pencan       Introducer used       Introducer: 20 G       # of attempts: 1 and  # of redirects:  1    Assessment/Narrative         Paresthesias: No.       Sensory Level: T10       CSF fluid: clear.    Medication(s) Administered   0.75% Hyperbaric Bupivacaine (Intrathecal) - Intrathecal   2 mL - 5/30/2023 11:15:00 AM    FOR [facility] ([facility]) ONLY:   Pain Team Contact information: please page the Pain Team Via [employer]. Search \"Pain\". During daytime hours, please page the attending first. At night please page the resident first.      "

## 2023-05-31 ENCOUNTER — APPOINTMENT (OUTPATIENT)
Dept: PHYSICAL THERAPY | Facility: CLINIC | Age: 63
End: 2023-05-31
Attending: ORTHOPAEDIC SURGERY
Payer: COMMERCIAL

## 2023-05-31 VITALS
DIASTOLIC BLOOD PRESSURE: 88 MMHG | OXYGEN SATURATION: 98 % | RESPIRATION RATE: 18 BRPM | BODY MASS INDEX: 25.92 KG/M2 | HEART RATE: 117 BPM | WEIGHT: 175 LBS | TEMPERATURE: 97.7 F | HEIGHT: 69 IN | SYSTOLIC BLOOD PRESSURE: 137 MMHG

## 2023-05-31 LAB
ANION GAP SERPL CALCULATED.3IONS-SCNC: 12 MMOL/L (ref 7–15)
BUN SERPL-MCNC: 17.3 MG/DL (ref 8–23)
CALCIUM SERPL-MCNC: 8.6 MG/DL (ref 8.8–10.2)
CHLORIDE SERPL-SCNC: 100 MMOL/L (ref 98–107)
CREAT SERPL-MCNC: 0.92 MG/DL (ref 0.67–1.17)
DEPRECATED HCO3 PLAS-SCNC: 24 MMOL/L (ref 22–29)
ERYTHROCYTE [DISTWIDTH] IN BLOOD BY AUTOMATED COUNT: 13 % (ref 10–15)
GFR SERPL CREATININE-BSD FRML MDRD: >90 ML/MIN/1.73M2
GLUCOSE BLDC GLUCOMTR-MCNC: 140 MG/DL (ref 70–99)
GLUCOSE SERPL-MCNC: 204 MG/DL (ref 70–99)
HCT VFR BLD AUTO: 34.1 % (ref 40–53)
HGB BLD-MCNC: 11.1 G/DL (ref 13.3–17.7)
HGB BLD-MCNC: 11.1 G/DL (ref 13.3–17.7)
HOLD SPECIMEN: NORMAL
LACTATE SERPL-SCNC: 3.2 MMOL/L (ref 0.7–2)
MCH RBC QN AUTO: 30.4 PG (ref 26.5–33)
MCHC RBC AUTO-ENTMCNC: 33.3 G/DL (ref 31.5–36.5)
MCV RBC AUTO: 92 FL (ref 78–100)
PLATELET # BLD AUTO: 192 10E3/UL (ref 150–450)
POTASSIUM SERPL-SCNC: 4.2 MMOL/L (ref 3.4–5.3)
RBC # BLD AUTO: 3.69 10E6/UL (ref 4.4–5.9)
SODIUM SERPL-SCNC: 136 MMOL/L (ref 136–145)
WBC # BLD AUTO: 14.7 10E3/UL (ref 4–11)

## 2023-05-31 PROCEDURE — 97110 THERAPEUTIC EXERCISES: CPT | Mod: GP

## 2023-05-31 PROCEDURE — 99214 OFFICE O/P EST MOD 30 MIN: CPT

## 2023-05-31 PROCEDURE — 258N000003 HC RX IP 258 OP 636

## 2023-05-31 PROCEDURE — 36415 COLL VENOUS BLD VENIPUNCTURE: CPT

## 2023-05-31 PROCEDURE — 83605 ASSAY OF LACTIC ACID: CPT

## 2023-05-31 PROCEDURE — 97161 PT EVAL LOW COMPLEX 20 MIN: CPT | Mod: GP

## 2023-05-31 PROCEDURE — 36415 COLL VENOUS BLD VENIPUNCTURE: CPT | Performed by: PHYSICIAN ASSISTANT

## 2023-05-31 PROCEDURE — 82962 GLUCOSE BLOOD TEST: CPT

## 2023-05-31 PROCEDURE — 250N000013 HC RX MED GY IP 250 OP 250 PS 637: Performed by: PHYSICIAN ASSISTANT

## 2023-05-31 PROCEDURE — 80048 BASIC METABOLIC PNL TOTAL CA: CPT

## 2023-05-31 PROCEDURE — 97116 GAIT TRAINING THERAPY: CPT | Mod: GP

## 2023-05-31 PROCEDURE — 85018 HEMOGLOBIN: CPT | Performed by: PHYSICIAN ASSISTANT

## 2023-05-31 PROCEDURE — 250N000011 HC RX IP 250 OP 636: Performed by: PHYSICIAN ASSISTANT

## 2023-05-31 PROCEDURE — 85027 COMPLETE CBC AUTOMATED: CPT

## 2023-05-31 PROCEDURE — 250N000013 HC RX MED GY IP 250 OP 250 PS 637

## 2023-05-31 RX ORDER — HYDROXYZINE HYDROCHLORIDE 25 MG/1
25 TABLET, FILM COATED ORAL EVERY 6 HOURS PRN
Qty: 30 TABLET | Refills: 1 | Status: SHIPPED | OUTPATIENT
Start: 2023-05-31 | End: 2023-06-19

## 2023-05-31 RX ORDER — OXYCODONE HYDROCHLORIDE 5 MG/1
5-10 TABLET ORAL EVERY 4 HOURS PRN
Qty: 16 TABLET | Refills: 0 | Status: SHIPPED | OUTPATIENT
Start: 2023-05-31 | End: 2023-06-19

## 2023-05-31 RX ORDER — AMOXICILLIN 250 MG
1-2 CAPSULE ORAL 2 TIMES DAILY
Qty: 30 TABLET | Refills: 0 | Status: SHIPPED | OUTPATIENT
Start: 2023-05-31 | End: 2023-06-19

## 2023-05-31 RX ORDER — ACETAMINOPHEN 325 MG/1
650 TABLET ORAL EVERY 4 HOURS PRN
Qty: 100 TABLET | Refills: 0 | Status: ON HOLD | OUTPATIENT
Start: 2023-05-31 | End: 2023-08-23

## 2023-05-31 RX ORDER — ASPIRIN 325 MG
325 TABLET, DELAYED RELEASE (ENTERIC COATED) ORAL DAILY
Qty: 30 TABLET | Refills: 0 | Status: ON HOLD | OUTPATIENT
Start: 2023-05-31 | End: 2023-08-23

## 2023-05-31 RX ADMIN — ACETAMINOPHEN 975 MG: 325 TABLET, FILM COATED ORAL at 00:09

## 2023-05-31 RX ADMIN — CEFAZOLIN 1 G: 1 INJECTION, POWDER, FOR SOLUTION INTRAMUSCULAR; INTRAVENOUS at 03:25

## 2023-05-31 RX ADMIN — ACETAMINOPHEN 975 MG: 325 TABLET, FILM COATED ORAL at 09:47

## 2023-05-31 RX ADMIN — SENNOSIDES AND DOCUSATE SODIUM 1 TABLET: 50; 8.6 TABLET ORAL at 09:49

## 2023-05-31 RX ADMIN — AMLODIPINE BESYLATE 5 MG: 5 TABLET ORAL at 09:47

## 2023-05-31 RX ADMIN — SODIUM CHLORIDE, POTASSIUM CHLORIDE, SODIUM LACTATE AND CALCIUM CHLORIDE 1000 ML: 600; 310; 30; 20 INJECTION, SOLUTION INTRAVENOUS at 13:57

## 2023-05-31 RX ADMIN — ASPIRIN 325 MG: 325 TABLET ORAL at 09:47

## 2023-05-31 RX ADMIN — ACETAMINOPHEN 975 MG: 325 TABLET, FILM COATED ORAL at 16:23

## 2023-05-31 ASSESSMENT — ACTIVITIES OF DAILY LIVING (ADL)
ADLS_ACUITY_SCORE: 27
ADLS_ACUITY_SCORE: 24
ADLS_ACUITY_SCORE: 25
ADLS_ACUITY_SCORE: 27
ADLS_ACUITY_SCORE: 25
ADLS_ACUITY_SCORE: 27
ADLS_ACUITY_SCORE: 25
ADLS_ACUITY_SCORE: 28
ADLS_ACUITY_SCORE: 27

## 2023-05-31 NOTE — CONSULTS
Care Management Note:    Care Management team received referral from Ortho team to assist pt with discharge planning services post surgical services.    Per IDT rounds, EMR review, and/or discussion with PT/OT staff, it has been determined that pt will discharge to home and attend outpatient therapy services.    Care Management will close referral at this time.    JONNY DoyleW  Care Management, [facility]  [phone]

## 2023-05-31 NOTE — PROGRESS NOTES
Patient alert, orientedx4 Denies dizziness, dyspnea, nausea and reports adequate pain control with use of ice, repositioning and oral medications. Tolerating regular diet.  Ambulated with walker, gait belt and assistx1 in halls. Voiding without difficulty.  Lactic ordered per BPA protocol, MD notified of results. 500cc Bolus given. VSS, afebrile.

## 2023-05-31 NOTE — PROGRESS NOTES
05/31/23 0816   Appointment Info   Signing Clinician's Name / Credentials (PT) Alyssa Walker, PT   Quick Adds   Quick Adds Certification   Living Environment   People in Home alone   Current Living Arrangements house   Home Accessibility stairs within home   Number of Stairs, Within Home, Primary seven   Stair Railings, Within Home, Primary railings on both sides of stairs   Living Environment Comments All needs met on upper level. Plans to have friend stay with him to assist initially.   Self-Care   Equipment Currently Used at Home cane, straight;walker, standard;raised toilet seat   Fall history within last six months no   Activity/Exercise/Self-Care Comment Pt typically indep with mobility without device, has walker and cane from previous TKA.   General Information   Onset of Illness/Injury or Date of Surgery 05/30/23   Referring Physician Link, CALDERON Ceja   Patient/Family Therapy Goals Statement (PT) Return home today   Pertinent History of Current Problem (include personal factors and/or comorbidities that impact the POC) 63 y.o. male s/p R GUSTAVO performed 5/30, now WBAT without restrictions.   Weight-Bearing Status - RLE weight-bearing as tolerated   Cognition   Affect/Mental Status (Cognition) WFL   Orientation Status (Cognition) oriented x 4   Follows Commands (Cognition) WFL   Pain Assessment   Patient Currently in Pain Yes, see Vital Sign flowsheet  (R hip, well managed)   Range of Motion (ROM)   Range of Motion ROM deficits secondary to surgical procedure;ROM deficits secondary to pain   Strength (Manual Muscle Testing)   Strength (Manual Muscle Testing) Able to perform R SLR;Able to perform L SLR   Bed Mobility   Comment, (Bed Mobility) pt sitting in chair, denies concerns with bed mobility   Transfers   Comment, (Transfers) sit>stand from recliner with Luly and 2WW   Gait/Stairs (Locomotion)   [place] Level (Gait) contact guard   Assistive Device (Gait) walker, front-wheeled   Distance in Feet  20   Distance in Feet (Gait) 80   Pattern (Gait) step-to   Deviations/Abnormal Patterns (Gait) antalgic;gait speed decreased   Negotiation (Stairs) stairs independence;handrail location;number of steps;ascending technique;descending technique   [place] Level (Stairs) supervision   Handrail Location (Stairs) both sides   Number of Steps (Stairs) 5   Ascending Technique (Stairs) step-to-step   Descending Technique (Stairs) step-to-step   Clinical Impression   Criteria for Skilled Therapeutic Intervention Yes, treatment indicated   PT Diagnosis (PT) impaired mobility s/p R GUSTAVO   Influenced by the following impairments pain, reduced ROM, LE weakness   Functional limitations due to impairments impaired transfers, gait, stairs   Clinical Presentation (PT Evaluation Complexity) Stable/Uncomplicated   Clinical Presentation Rationale clinical reasoning   Clinical Decision Making (Complexity) low complexity   Planned Therapy Interventions (PT) balance training;bed mobility training;cryotherapy;home exercise program;patient/family education;ROM (range of motion);stair training;strengthening;transfer training   Anticipated Equipment Needs at Discharge (PT)   (has all equipment)   Risk & Benefits of therapy have been explained evaluation/treatment results reviewed;care plan/treatment goals reviewed;risks/benefits reviewed;current/potential barriers reviewed;participants voiced agreement with care plan;participants included;patient   PT Total Evaluation Time   PT Eval, Low Complexity Minutes (61969) 10   Therapy Certification   Start of care date 05/31/23   Certification date from 05/31/23   Certification date to 05/31/23   Medical Diagnosis s/p R GUSTAVO   Physical Therapy Goals   PT Frequency One time eval and treatment only   PT Predicted Duration/Target Date for Goal Attainment 05/31/23   PT Goals Transfers;Gait;Stairs;PT Goal 1   PT: Transfers Supervision/stand-by assist;Sit to/from stand;Goal Met   PT: Gait  Supervision/stand-by assist;Standard walker;100 feet;Goal Met   PT: Stairs Supervision/stand-by assist;5 stairs;Rail on both sides;Goal Met   PT: Goal 1 Pt will be indep in R GUSTAVO HEP in order to progress aftercares. Goal met.   Interventions   Interventions Quick Adds Gait Training;Therapeutic Procedure   Therapeutic Procedure/Exercise   Ther. Procedure: strength, endurance, ROM, flexibillity Minutes (60067) 15   Symptoms Noted During/After Treatment increased pain   Treatment Detail/Skilled Intervention Longsitting in recliner reviewed R GUSTAVO HEP following printed handout x10 reps each: ankle pumps, quad sets, glute sets, hamstring sets, heel slides, SAQ, and SLR. Instructed to complete 2x/day.   Gait Training   Gait Training Minutes (83086) 15   Symptoms Noted During/After Treatment (Gait Training) fatigue;increased pain   Treatment Detail/Skilled Intervention Amb x80 feet with 2WW and SBA, cues for upright posture and heel strike with good carryover, hx of L ankle fusion which impacts gait pattern. Education provided on sequencing of stairs to reduce pain and increase safety. Pt verbalized understanding   [place] Level (Gait Training) stand-by assist   Physical Assistance Level (Gait Training) verbal cues   Weight Bearing (Gait Training) weight-bearing as tolerated   Assistive Device (Gait Training) standard walker   Gait Analysis Deviations decreased fawn;decreased stride length;decreased weight-shifting ability   Impairments (Gait Analysis/Training) pain;ROM decreased;strength decreased   Stair Railings present at both sides   Physical Assist/Nonphysical Assist (Stairs) verbal cues   Level of [place] (Stairs) stand-by assist   PT Discharge Planning   PT Plan d/c PT, goals met   PT Discharge Recommendation (DC Rec) home with outpatient physical therapy   PT Rationale for DC Rec rec OP PT to progress R GUSTAVO aftercares   PT Brief overview of current status amb 100 feet with 2WW and Luly, up/down 5  stairs   Total Session Time   Timed Code Treatment Minutes 30   Total Session Time (sum of timed and untimed services) 40     M [facility]  OUTPATIENT PHYSICAL THERAPY EVALUATION  PLAN OF TREATMENT FOR OUTPATIENT REHABILITATION  (COMPLETE FOR INITIAL CLAIMS ONLY)  Patient's Last Name, First Name, M.I.  YOB: 1960  Case Epstein                        Provider's Name  [facility] Medical Record No.  [id number]                             Onset Date:  05/30/23   Start of Care Date:  05/31/23   Type:     _X_PT   ___OT   ___SLP Medical Diagnosis:  s/p R GUSTAVO              PT Diagnosis:  impaired mobility s/p R GUSTAVO Visits from SOC:  1     See note for plan of treatment, functional goals and certification details    I CERTIFY THE NEED FOR THESE SERVICES FURNISHED UNDER        THIS PLAN OF TREATMENT AND WHILE UNDER MY CARE     (Physician co-signature of this document indicates review and certification of the therapy plan).

## 2023-05-31 NOTE — PLAN OF CARE
Physical Therapy Discharge Summary    Reason for therapy discharge:    All goals and outcomes met, no further needs identified.    Progress towards therapy goal(s). See goals on Care Plan in [facility] electronic health record for goal details.  Goals met    Therapy recommendation(s):    Continued therapy is recommended.  Rationale/Recommendations:  Recommend continued OP PT to progress R GUSTAVO aftercares.

## 2023-05-31 NOTE — PLAN OF CARE
[place] [facility] DISCHARGE NOTE    Patient discharged to home at 5:15 PM via wheel chair. Accompanied by other: Friend Liseth and staff. Discharge instructions reviewed with patient and Friend Liseth , opportunity offered to ask questions. Prescriptions sent to patients preferred pharmacy. All belongings sent with patient.    Mariella Jean RN    Goal Outcome Evaluation:      Plan of Care Reviewed With: patient, friend

## 2023-05-31 NOTE — PROGRESS NOTES
"[place] [facility] DAILY PROGRESS NOTE - patient not seen.      History: Case Epstein is a 63 year old male POD # 1 s/p right total hip arthroplasty on 2023 with Dr Randle    Interval events: tachycardia      Spoke with ИРИНА portillo, sounds like overall Case is doing well. Pain control with prescribed medications. Has been up with therapy, nursing without problems. Getting ivf bolus for tachycardia, per hospitalists. Think patient may go home today as otherwise doing well.      OBJECTIVE:  /88 (BP Location: Left arm)   Pulse 117   Temp 97.7  F (36.5  C) (Oral)   Resp 18   Ht 1.753 m (5' 9\")   Wt 79.4 kg (175 lb)   SpO2 98%   BMI 25.84 kg/m      Temp (24hrs), Av  F (36.1  C), Min:96.3  F (35.7  C), Max:97.7  F (36.5  C)        Invalid input(s): HEMOGLOBIN   Hemoglobin   Date Value Ref Range Status   2023 11.1 (L) 13.3 - 17.7 g/dL Final   2023 11.1 (L) 13.3 - 17.7 g/dL Final   2021 15.4 13.3 - 17.7 g/dL Final   ].  No results for input(s): INR in the last 53803 hours.  Recent Labs   Lab Test 23  0958 23  0852 21  0951   POTASSIUM 4.2 4.2 4.2   CHLORIDE 100 107 106   ANIONGAP 12 4 2*                ASSESSMENT: Case Epstein is a 63 year old male POD # 1 s/p right total hip arthroplasty on 2023, doing well.    PLAN:  * Weightbearing: weight bear as tolerated.  * No hip precautions.  * Diet  * IVF, saline lock when tolerating good PO  * IV antibiotics x23 hours  * Consults: Hospitalists, appreciate medical comanagement.    * Pain control: PO pain meds w/ iv for break through pain  * DVT prophy: Seqential compression devices in hospital, 4 weeks of 325mg aspirin daily  * Activity: with assist and assistive device.  * PT: twice daily  * Remove waterproof Aquacel AG dressing 7-10 days, cover with dry guaze as needed.    * Anticipate d/c home today.  * postop followup with Dr Randle upon discharge.      Dennis Randle M.D., M.S.  Dept. of Orthopaedic " Surgery  [facility]

## 2023-05-31 NOTE — PLAN OF CARE
Patient is alert and oriented x 4.  Up with assist of 1, gait belt and walker; ambulated into bathroom and in the hallway x 2.  Pain is being managed with tylenol and ice pack.  Good oral intake.  Voiding well and passing gas.  Last bowel movement 5/30 per patient report.    Goal Outcome Evaluation:      Plan of Care Reviewed With: patient

## 2023-05-31 NOTE — PROGRESS NOTES
[facility] Medicine Progress Note  Date of Service: 05/31/2023    Assessment & Plan   Case Epstein is a 63 year old male who presented on 5/30/2023 for scheduled Procedure(s):  Right ARTHROPLASTY HIP TOTAL DIRECT ANTERIOR APPROACH by Dennis Randle MD and is being followed by the hospital medicine service for co-management of acute and/or chronic perioperative medical problems.      S/p Procedure(s):  Right ARTHROPLASTY HIP TOTAL DIRECT ANTERIOR APPROACH   1 Day Post-Op    - Pain control, wound cares, physical therapy, occupational therapy and DVT prophylaxis per orthopedic surgery service    Sepsis; unlikely  Upon arrival to the floor post operatively sepsis BPA fired for temperature of 96.3 and heart rate of 98. He was given a 500 ml bolus. Lactate was ordered; 2.2 --> 5.2. His blood pressure has remained normal and stable. Temperature stable at 97.7. Patient has been mildly tachycardic this morning 101-118. He states that he does have some dizziness when sitting up. There is no presumed source of infection at this time. Patient states he is not longer dizzy after IV fluid bolus, but still remains mildly tachycardic at 110. Review of chart shows that his baseline heart rate is 80s-100s. He is perfusing well and urinating appropriately.   - Repeat lactate 05/32; 3.2  - Check CBC and BMP 05/31  - 1 liter bolus LR.    Essential Hypertension  History of hypertension that is currently being managed PTA with amlodipine 5 mg daily. Hypertension has been well managed post operatively, blood pressure has remained normal throughout.     Acute Post-Operative Anemia  Hemoglobin of 11.1 post operatively. Pre-op hemoglobin 04/24 was 15.6. He is mildly tachycardic. No dizziness or lightheadedness. No concern of acute bleeding at this time.  - Follow hemoglobin daily.    Principal Problem:    S/P hip replacement, right    DVT Prophylaxis: as per orthopedic surgery service - Aspirin  325 mg daily x 30 days  Code Status: Full Code    Lines: PIV   Monk catheter: None    Discussion: Medically, the patient is stable and cleared for discharge.    Disposition: Anticipate discharge 05/31/23     Attestation:  I have reviewed today's vital signs, notes, medications, labs and imaging.    I have discussed, or will be discussing, the patient with hospitalist physician, Dr. Alisha TA, MAICO PAYangC     Interval History   POD#1: Routine post-op morning rounds. Upon arrival to the floor post operatively sepsis BPA fired for temperature of 96.3 and heart rate of 98. He was given a 500 ml bolus. Lactate was ordered; 2.2 --> 5.2. His blood pressure has remained normal and stable. Temperature stable at 97.7. Patient has been mildly tachycardic this morning 101-118. He does note some dizziness with sitting up that resolves quickly. Patient states that he is doing well out side of some pain that is largely controlled. He endorses intact appetite and tolerating PO without nausea or vomiting. He endorses urinating without difficulty and has been passing flatus without a bowel movement. He states ambulating in the [place] last evening and using restroom overnight, feeling steady on his feet without dizziness or lightheadedness. He denies chest pain, shortness of breath, cough, nausea, vomiting, and abdominal pain.    Physical Exam   Temp:  [96.3  F (35.7  C)-97.9  F (36.6  C)] 97.7  F (36.5  C)  Pulse:  [] 118  Resp:  [14-21] 18  BP: (112-138)/(70-88) 137/88  SpO2:  [94 %-100 %] 96 %    Weights:   Vitals:    05/30/23 0855   Weight: 79.4 kg (175 lb)    Body mass index is 25.84 kg/m .    General: Alert, oriented, cooperative, polite, no acute distress, speaking coherently, semireclining in bed.  CV: Tachycardic, regular rhythm, normal S1 and S2. No S3, S4, rubs, gallops, or clicks appreciated. No lower extremity edema. Strong peripheral pulse bilaterally.  Respiratory: CTA bilaterally. No wheezes, crackles,  or rales. Normal respiratory effort on room air. Speaking in full sentences.  GI: Soft, flat, non tender to palpation, normoactive bowel movements. No hepatosplenomegaly or abdominal masses appreciated.  Skin: Warm and dry, no rashes noted to extremities, chest or abdomen.   Musculoskeletal: Surgical site exam deferred to orthopedic provider. Dorsiflexion and plantarflexion intact. Sensation intact and symmetrical bilaterally. Pedal pulse present bilaterally. No calf tenderness or pain bilaterally.    Data   Recent Labs   Lab 05/31/23  0629 05/31/23  0511   HGB  --  11.1*   *  --        Recent Labs   Lab 05/31/23  0629   *        Unresulted Labs Ordered in the Past 30 Days of this Admission     No orders found for last 31 day(s).         Imaging  Recent Results (from the past 24 hour(s))   POC US Guidance Needle Placement    Impression    Normal anatomy    XR Surgery JASON Fluoro Less Than 5 Min    Narrative    This exam was marked as non-reportable because it will not be read by a   radiologist or a [facility] non-radiologist provider.         XR Pelvis Port 1/2 Views    Narrative    XR PELVIS PORT 1/2 VIEWS 5/30/2023 3:46 PM     HISTORY: Status post Hip surgery    COMPARISON: None.       Impression    IMPRESSION: Postoperative changes right total hip arthroplasty.  Components appear well seated. Advanced degenerative change left hip  with superior joint space narrowing.    PATRIZIA NICOLE MD         SYSTEM ID:  [id number]        I reviewed all new labs and imaging results over the last 24 hours. I personally reviewed no images or EKG's today.    Medications     lactated ringers 100 mL/hr at 05/30/23 1650       acetaminophen  975 mg Oral Q8H     amLODIPine  5 mg Oral Daily     aspirin  325 mg Oral Daily     polyethylene glycol  17 g Oral Daily     senna-docusate  1 tablet Oral BID     sodium chloride (PF)  3 mL Intracatheter Q8H       MAICO TA PA-C

## 2023-06-05 ENCOUNTER — THERAPY VISIT (OUTPATIENT)
Dept: PHYSICAL THERAPY | Facility: CLINIC | Age: 63
End: 2023-06-05
Attending: ORTHOPAEDIC SURGERY
Payer: COMMERCIAL

## 2023-06-05 DIAGNOSIS — Z96.641 S/P HIP REPLACEMENT, RIGHT: Primary | ICD-10-CM

## 2023-06-05 DIAGNOSIS — M16.11 PRIMARY OSTEOARTHRITIS OF RIGHT HIP: ICD-10-CM

## 2023-06-05 PROCEDURE — 97110 THERAPEUTIC EXERCISES: CPT | Mod: GP

## 2023-06-05 PROCEDURE — 97161 PT EVAL LOW COMPLEX 20 MIN: CPT | Mod: GP

## 2023-06-05 ASSESSMENT — ACTIVITIES OF DAILY LIVING (ADL)
GOING_UP_1_FLIGHT_OF_STAIRS: SLIGHT DIFFICULTY
ROLLING_OVER_IN_BED: SLIGHT DIFFICULTY
WALKING_UP_STEEP_HILLS: UNABLE TO DO
WALKING_15_MINUTES_OR_GREATER: SLIGHT DIFFICULTY
TWISTING/PIVOTING_ON_INVOLVED_LEG: MODERATE DIFFICULTY
HOS_ADL_SCORE(%): 39.71
DEEP_SQUATTING: UNABLE TO DO
HOS_ADL_COUNT: 17
HEAVY_WORK: UNABLE TO DO
STANDING_FOR_15_MINUTES: MODERATE DIFFICULTY
RECREATIONAL_ACTIVITIES: UNABLE TO DO
GOING_DOWN_1_FLIGHT_OF_STAIRS: SLIGHT DIFFICULTY
STEPPING_UP_AND_DOWN_CURBS: UNABLE TO DO
SITTING_FOR_15_MINUTES: NO DIFFICULTY AT ALL
HOW_WOULD_YOU_RATE_YOUR_CURRENT_LEVEL_OF_FUNCTION_DURING_YOUR_USUAL_ACTIVITIES_OF_DAILY_LIVING_FROM_0_TO_100_WITH_100_BEING_YOUR_LEVEL_OF_FUNCTION_PRIOR_TO_YOUR_HIP_PROBLEM_AND_0_BEING_THE_INABILITY_TO_PERFORM_ANY_OF_YOUR_USUAL_DAILY_ACTIVITIES?: 50
WALKING_INITIALLY: MODERATE DIFFICULTY
WALKING_APPROXIMATELY_10_MINUTES: NO DIFFICULTY AT ALL
LIGHT_TO_MODERATE_WORK: MODERATE DIFFICULTY
HOS_ADL_HIGHEST_POTENTIAL_SCORE: 68
HOS_ADL_ITEM_SCORE_TOTAL: 27
WALKING_DOWN_STEEP_HILLS: UNABLE TO DO
GETTING_INTO_AND_OUT_OF_AN_AVERAGE_CAR: SLIGHT DIFFICULTY
PUTTING_ON_SOCKS_AND_SHOES: SLIGHT DIFFICULTY
GETTING_INTO_AND_OUT_OF_A_BATHTUB: UNABLE TO DO

## 2023-06-05 NOTE — PROGRESS NOTES
PHYSICAL THERAPY EVALUATION  Type of Visit: Evaluation    See electronic medical record for Abuse and Falls Screening details.    Subjective      Presenting condition or subjective complaint: Work on strengthening and mobility after right hip surgery.  Date of onset: 05/30/23    Relevant medical history: Arthritis; High blood pressure   Dates & types of surgery: May of 2021 right knee replacement. Nov of 2018 left ankle fusion.    Prior diagnostic imaging/testing results: X-ray     Prior therapy history for the same diagnosis, illness or injury: No      Prior Level of Function   Transfers:   Ambulation:   ADL:   IADL:     Living Environment  Social support: Alone   Type of home: House; 2-story; Basement   Stairs to enter the home: No       Ramp: No   Stairs inside the home: Yes 18 Is there a railing: Yes   Help at home: None  Equipment owned: Straight Cane; Walker; Raised toilet seat; Dressing equipment     Employment: No    Hobbies/Interests: Na    Patient goals for therapy: Walk without the use of an aid.    Pain assessment: Location: Right Anterior Hip/Rating: Minimal     Objective   HIP EVALUATION  PAIN: Pain Level at Rest: 0/10  Pain Location: hip  Pain Frequency: intermittent  Pain Progression: Improved  INTEGUMENTARY (edema, incisions): Not formally measured, however increased edema in right LE (Thigh, Knee, Calf and Ankle)  POSTURE:   GAIT:   Weightbearing Status: WBAT  Assistive Device(s): Walker (front wheeled)  Gait Deviations: Antalgic  Stance time decreased  Stride length decreased  Balance/Proprioception: Unable to tolerate SL Stance weight bearing through the right leg  Weight Bearing Alignment:   Non-Weight Bearing Alignment:    ROM: AROM WFL  PROM WFL  Not taken to end range due to recent R GUSTAVO.  No limitations in ROM and able to perform all functional tasks    PELVIC/SI SCREEN:   Strength: 3/5 hip flexion, knee extension 4+5, knee flexion 4+/5, ankle DF 5/5  LE FLEXIBILITY:   Special Tests:    FUNCTIONAL TESTS:   PALPATION:   JOINT MOBILITY: Inappropriate to assess    Assessment & Plan   CLINICAL IMPRESSIONS   Medical Diagnosis: S/p 5/30/2023 R GUSTAVO Anterior Approach    Treatment Diagnosis: S/p Right GUSTAVO   Impression/Assessment: Patient is a 63 year old male with S/p Right GUSTAVO complaints.  The following significant findings have been identified: Pain, Decreased ROM/flexibility, Decreased strength, Impaired balance and Impaired gait. These impairments interfere with their ability to perform self care tasks, work tasks, recreational activities, household chores, driving , household mobility and community mobility as compared to previous level of function.     Clinical Decision Making (Complexity):   Clinical Presentation: Stable/Uncomplicated  Clinical Presentation Rationale: based on medical and personal factors listed in PT evaluation  Clinical Decision Making (Complexity): Low complexity    PLAN OF CARE  Treatment Interventions:  Interventions: Gait Training, Manual Therapy, Neuromuscular Re-education, Therapeutic Activity, Therapeutic Exercise    Long Term Goals     PT Goal 1  Goal Identifier: Walking  Goal Description: Will be able to ambulate 0.5 miles without any assistive device or without gait deviation due to his hip (Has limited knee extension from prior TKA so this will impact his gait)  Rationale: to maximize safety and independence with performance of ADLs and functional tasks;to maximize safety and independence within the home;to maximize safety and independence within the community;to maximize safety and independence with transportation;to maximize safety and independence with self cares  Goal Progress: Ambulating with Walker around house independently  Target Date: 07/31/23  PT Goal 2  Goal Identifier: Squatting  Goal Description: Will be able to squat and pick something off the ground without weakness, balance deficit or inability to perform due to the right hip  Rationale: to maximize  safety and independence with performance of ADLs and functional tasks;to maximize safety and independence within the home;to maximize safety and independence within the community;to maximize safety and independence with transportation;to maximize safety and independence with self cares  Goal Progress: Can perform sit to stand unassisted from elevated surface  Target Date: 07/31/23      Frequency of Treatment: 1x a day  Duration of Treatment: 8 weeks    Recommended Referrals to Other Professionals: Physical Therapy  Education Assessment:        Risks and benefits of evaluation/treatment have been explained.   Patient/Family/caregiver agrees with Plan of Care.     Evaluation Time:     PT Eval, Low Complexity Minutes (83062): 16      Signing Clinician: Bill Mackenzie PT

## 2023-06-12 ASSESSMENT — HOOS JR
BENDING TO THE FLOOR TO PICK UP OBJECT: MODERATE
WALKING ON UNEVEN SURFACE: MILD
HOOS JR TOTAL INTERVAL SCORE: 80.56

## 2023-06-13 ENCOUNTER — THERAPY VISIT (OUTPATIENT)
Dept: PHYSICAL THERAPY | Facility: CLINIC | Age: 63
End: 2023-06-13
Payer: COMMERCIAL

## 2023-06-13 DIAGNOSIS — Z96.641 S/P HIP REPLACEMENT, RIGHT: Primary | ICD-10-CM

## 2023-06-13 PROCEDURE — 97110 THERAPEUTIC EXERCISES: CPT | Mod: 59

## 2023-06-13 PROCEDURE — 97530 THERAPEUTIC ACTIVITIES: CPT | Mod: GP

## 2023-06-13 NOTE — PROGRESS NOTES
06/13/23 0500   Appointment Info   Signing clinician's name / credentials Bill Mackenzie, PT, DPT, CSCS, CLT   Total/Authorized Visits E&T   Visits Used 2   Medical Diagnosis S/p 5/30/2023 R GUSTAVO Anterior Approach   PT Tx Diagnosis S/p Right GUSTAVO   Precautions/Limitations R GUSTAVO Anterior Approach - No hip precautions   Progress Note/Certification   Onset of illness/injury or Date of Surgery 05/30/23   Therapy Frequency 1x a day   Predicted Duration 8 weeks   Progress Note Due Date 07/31/23   Progress Note Completed Date 06/05/23   GOALS   PT Goals 2   PT Goal 1   Goal Identifier Walking   Goal Description Will be able to ambulate 0.5 miles without any assistive device or without gait deviation due to his hip (Has limited knee extension from prior TKA so this will impact his gait)   Rationale to maximize safety and independence with performance of ADLs and functional tasks;to maximize safety and independence within the home;to maximize safety and independence within the community;to maximize safety and independence with transportation;to maximize safety and independence with self cares   Goal Progress Ambulating with Walker around house independently   Target Date 07/31/23   PT Goal 2   Goal Identifier Squatting   Goal Description Will be able to squat and pick something off the ground without weakness, balance deficit or inability to perform due to the right hip   Rationale to maximize safety and independence with performance of ADLs and functional tasks;to maximize safety and independence within the home;to maximize safety and independence within the community;to maximize safety and independence with transportation;to maximize safety and independence with self cares   Goal Progress Can perform sit to stand unassisted from elevated surface   Target Date 07/31/23   Subjective Report   Subjective Report He states last wednesday he started taking some steps without the walker. This seemed to go well, so then he switched  "and started walking with his cane.  Then he has been also going on progressively increased number of walks outside. This has gone well and he is up to 3x a day. No increased in pain and he feels stronger/better every day.   Objective Measures   Objective Measures Objective Measure 1;Objective Measure 2   Objective Measure 1   Objective Measure Sit to stand   Details 18 sit to stands unassisted from elevated plinth   Objective Measure 2   Objective Measure SLR Endurance   Details 15 (No anterior hip pain)   Treatment Interventions (PT)   Interventions Therapeutic Procedure/Exercise;Therapeutic Activity   Therapeutic Procedure/Exercise   Therapeutic Procedures: strength, endurance, ROM, flexibillity minutes (32754) 23   PTRx Ther Proc 1 Ankle Pumps in Long Sitting   PTRx Ther Proc 1 - Details 1x30 in supine feet elevated on a bolster   PTRx Ther Proc 2 Bridging #1   PTRx Ther Proc 2 - Details 1x30   PTRx Ther Proc 3 Hip Flexion Straight Leg Raise   PTRx Ther Proc 3 - Details 1x15   PTRx Ther Proc 4 Hip Abduction Straight Leg Raise   PTRx Ther Proc 4 - Details 1x15   PTRx Ther Proc 5 Toe Raises   PTRx Ther Proc 5 - Details 1x30   PTRx Ther Proc 6 Standing Hip Abduction   PTRx Ther Proc 6 - Details 1x20 (No anterior hip pull)   PTRx Ther Proc 7 Hip AROM Standing Extension   PTRx Ther Proc 7 - Details 1x20   PTRx Ther Proc 8 Toe Raises   PTRx Ther Proc 8 - Details 1x20   Skilled Intervention Creation of HEP   Patient Response/Progress Tolerated well. Will perform at home   Ther Proc 1 Bike   Ther Proc 1 - Details x11 minutes for 3 miles for warm up (Looked smooth and felt good)   Therapeutic Activity   PTRx Ther Act 1 Sit to Stand   PTRx Ther Act 1 - Details 1x18   PTRx Ther Act 2 Sidestep   PTRx Ther Act 2 - Details 3x10 steps each direction   PTRx Ther Act 3 Forward Step   PTRx Ther Act 3 - Details 1x10 forward up 4\" step (5\" too big and too difficult 1x5)   PTRx Ther Act 4 Stepdown Forward   PTRx Ther Act 4 - Details " "1x10 with 4\" box (very easy)   Therapeutic Activities: dynamic activities to improve functional performance minutes (04155) 21   Patient Response/Progress Tolerated well. Some weakness with hip extension / knee extension ascending stairs and performs more of a rapid left hip flexion while trunk remains flexed instead off pushing off of the right leg. Also has tilting pelvis and poor hip/pelvic mobility/control during lateral side stepping   Plan   Home program See PTRx   Updates to plan of care Appropriate to continue created POC   Plan for next session Progress with emphasis on closed chain strengthening and more functional based work. Has difficulty with hip control with lateral walking and ascending/descending stairs   Total Session Time   Timed Code Treatment Minutes 44   Total Treatment Time (sum of timed and untimed services) 44         PLAN  Continue therapy per current plan of care.    Beginning/End Dates of Progress Note Reporting Period:  06/05/23 to 06/13/2023    Referring Provider:  Dennis Randle    "

## 2023-06-15 NOTE — PROGRESS NOTES
CHIEF COMPLAINT:   Chief Complaint   Patient presents with     Right Hip - Total Joint Post-op     DOS 5/30/23, 3 wk s/p. Patient presents to clinic using a cane as a walking aid. He is able to get around the house occasionally without it. Patient notes his hip is doing good, no pain.          SURGERY: Total hip arthroplasty, right hip, anterior approach ([facility])  DATE OF SURGERY: 5/30/2023      HISTORY OF PRESENT ILLNESS: Case Epstein is a 63 year old male seen for postoperative evaluation of right total hip arthroplasty. It has been 3 weeks since surgery. Returns today stating doing well, no pain. Walking with a cane. Denies any wound problems. Denies numbness, tingling, or weakness in the affected extremity. Denies fevers chills night sweats. Continues to take aspirin for anti-coagulation for deep vein thrombosis prophylaxis. Use of pain medications has been over the counter .  Concerns today include: none..    Present symptoms: stiffness, swelling.    Pain severity: 0/10      PAST MEDICAL HISTORY:   Past Medical History:   Diagnosis Date     High cholesterol      OA (OSTEOARTHRITIS) OF KNEE - right 9/27/2011     Patient Active Problem List   Diagnosis     Primary osteoarthritis of right knee     Vitamin D deficiency disease     Advanced directives, counseling/discussion     CARDIOVASCULAR SCREENING; LDL GOAL LESS THAN 100     Pain in joint, ankle and foot, left     S/P hip replacement, right     Primary osteoarthritis of right hip       PAST SURGICAL HISTORY:   Past Surgical History:   Procedure Laterality Date     ARTHRODESIS ANKLE Left 11/20/2017    Procedure: ARTHRODESIS ANKLE;  LEFT ANKLE FUSION (C-ARM);  Surgeon: Shaggy Linn MD;  Location: [facility]     ARTHROPLASTY HIP ANTERIOR Right 5/30/2023    Procedure: Right ARTHROPLASTY HIP TOTAL DIRECT ANTERIOR APPROACH;  Surgeon: Dennis Randle MD;  Location: [facility] OR     COLONOSCOPY       ORTHOPEDIC SURGERY Right     right TKA  "May 2021     [facility] AFF PALATE/UVULA SURGERY UNLISTED  age 2 and in 1986    2 procedures for palate/nasal issues       Medications:   Current Outpatient Medications:      acetaminophen (TYLENOL) 325 MG tablet, Take 2 tablets (650 mg) by mouth every 4 hours as needed for other (mild pain), Disp: 100 tablet, Rfl: 0     amLODIPine (NORVASC) 5 MG tablet, Take 1 tablet (5 mg) by mouth daily, Disp: 90 tablet, Rfl: 3     aspirin (ASA) 325 MG EC tablet, Take 1 tablet (325 mg) by mouth daily, Disp: 30 tablet, Rfl: 0     hydrOXYzine (ATARAX) 25 MG tablet, Take 1 tablet (25 mg) by mouth every 6 hours as needed for itching or anxiety (with pain, moderate pain), Disp: 30 tablet, Rfl: 1     ibuprofen (ADVIL/MOTRIN) 200 MG tablet, Take 200 mg by mouth every 4 hours as needed for mild pain, Disp: , Rfl:      oxyCODONE (ROXICODONE) 5 MG tablet, Take 1-2 tablets (5-10 mg) by mouth every 4 hours as needed for moderate to severe pain, Disp: 16 tablet, Rfl: 0     senna-docusate (SENOKOT-S/PERICOLACE) 8.6-50 MG tablet, Take 1-2 tablets by mouth 2 times daily Take while on oral narcotics to prevent or treat constipation., Disp: 30 tablet, Rfl: 0    Allergies: No Known Allergies      REVIEW OF SYSTEMS:  CONSTITUTIONAL:NEGATIVE for fever, chills, night sweats, change in weight  INTEGUMENTARY/SKIN: NEGATIVE for worrisome rashes, moles or lesions  MUSCULOSKELETAL:See HPI above  NEURO: NEGATIVE for weakness, dizziness or paresthesias    PHYSICAL EXAM:  /80   Pulse 81   Ht 1.753 m (5' 9\")   Wt 76.6 kg (168 lb 14.4 oz)   BMI 24.94 kg/m     GENERAL APPEARANCE: healthy, alert, no distress  SKIN: no suspicious lesions or rashes  NEURO: Normal strength and tone, mentation intact and speech normal  PSYCH:  mentation appears normal and affect normal/bright  RESPIRATORY: No increased work of breathing.    BILATERAL LOWER EXTREMITIES:  Gait: quadriceps avoidance right.    Bilateral calf soft and nttp or squeeze.    right lower extremity:  Intact " sensation deep peroneal nerve, superficial peroneal nerve, med/lat tibial nerve, sural nerve, saphenous nerve  Intact EHL, EDL, TA, FHL, GS, quadriceps hamstrings and hip flexors  Toes warm and well perfused, brisk capillary refill. Palpable 2+ dp pulses.  Edema: none    right HIP EXAM:    Surgical dressing partially in place, there is diffuse wetness under the bandage.  Incision: healing well. A little maceration proximally at inguinal fold.  Palpation: Tender:   Incisional, tensor  Strength:  4/5  Special tests:  Irritability (flexion/adduction/internal rotation) negative.  Hip range of motion: normal, all pain free  Leg lengths: grossly symmetric at medial malleolus.      X-RAY:  AP pelvis, lateral views right hip from 6/19/2023 were reviewed in clinic today. No obvious fractures or dislocations. Cemented hip arthroplasty components in place without obvious failure, complication, fracture, or dislocation. Severe left hip degenerative changes.      Impression: 63 year old male seen for postoperative evaluation of right total hip arthroplasty. It has been 3 weeks since surgery. Doing well.      Plan:   * reviewed xrays with patient today showing total hip arthroplasty implants.  * continue DVT prophylaxis for a total of 4 weeks postoperative  * betadine swabs to proximal incision twice daily. Keep wound clean and dry. No soaking in tub/pool.  * wean off all pain medications as tolerated  * xrays next visit: lowset AP pelvis and lateral hip  * return to clinic 4 weeks, sooner if needed.  * all questions addressed and answered prior to discharge from clinic today to patient's satisfaction.  * patient will need longterm (at least 2 years) prophylactic antibiotics use with dental procedures or other invasive procedures (2 g amoxicilin or  2g Keflex or 500mg azithromycin or clarithromycin or 100mg doxycycline) 30-60 minutes prior to dental procedures.        Dennis Randle M.D., M.S.  Dept. of Orthopaedic  Surgery  [facility]

## 2023-06-19 ENCOUNTER — OFFICE VISIT (OUTPATIENT)
Dept: ORTHOPEDICS | Facility: CLINIC | Age: 63
End: 2023-06-19
Payer: COMMERCIAL

## 2023-06-19 ENCOUNTER — ANCILLARY PROCEDURE (OUTPATIENT)
Dept: GENERAL RADIOLOGY | Facility: CLINIC | Age: 63
End: 2023-06-19
Attending: ORTHOPAEDIC SURGERY
Payer: COMMERCIAL

## 2023-06-19 VITALS
WEIGHT: 168.9 LBS | DIASTOLIC BLOOD PRESSURE: 80 MMHG | SYSTOLIC BLOOD PRESSURE: 119 MMHG | HEIGHT: 69 IN | BODY MASS INDEX: 25.02 KG/M2 | HEART RATE: 81 BPM

## 2023-06-19 DIAGNOSIS — Z96.641 STATUS POST TOTAL REPLACEMENT OF RIGHT HIP: ICD-10-CM

## 2023-06-19 DIAGNOSIS — Z96.641 STATUS POST TOTAL REPLACEMENT OF RIGHT HIP: Primary | ICD-10-CM

## 2023-06-19 PROCEDURE — 73502 X-RAY EXAM HIP UNI 2-3 VIEWS: CPT | Mod: TC | Performed by: RADIOLOGY

## 2023-06-19 PROCEDURE — 99024 POSTOP FOLLOW-UP VISIT: CPT | Performed by: ORTHOPAEDIC SURGERY

## 2023-06-19 ASSESSMENT — PAIN SCALES - GENERAL: PAINLEVEL: NO PAIN (0)

## 2023-06-19 NOTE — LETTER
6/19/2023         RE: Case Epstein  [address]  [place] [address]        Dear Colleague,    Thank you for referring your patient, Case Epstein, to the [facility]. Please see a copy of my visit note below.      CHIEF COMPLAINT:   Chief Complaint   Patient presents with     Right Hip - Total Joint Post-op     DOS 5/30/23, 3 wk s/p. Patient presents to clinic using a cane as a walking aid. He is able to get around the house occasionally without it. Patient notes his hip is doing good, no pain.          SURGERY: Total hip arthroplasty, right hip, anterior approach ([facility])  DATE OF SURGERY: 5/30/2023      HISTORY OF PRESENT ILLNESS: Case Epstein is a 63 year old male seen for postoperative evaluation of right total hip arthroplasty. It has been 3 weeks since surgery. Returns today stating doing well, no pain. Walking with a cane. Denies any wound problems. Denies numbness, tingling, or weakness in the affected extremity. Denies fevers chills night sweats. Continues to take aspirin for anti-coagulation for deep vein thrombosis prophylaxis. Use of pain medications has been over the counter .  Concerns today include: none..    Present symptoms: stiffness, swelling.    Pain severity: 0/10      PAST MEDICAL HISTORY:   Past Medical History:   Diagnosis Date     High cholesterol      OA (OSTEOARTHRITIS) OF KNEE - right 9/27/2011     Patient Active Problem List   Diagnosis     Primary osteoarthritis of right knee     Vitamin D deficiency disease     Advanced directives, counseling/discussion     CARDIOVASCULAR SCREENING; LDL GOAL LESS THAN 100     Pain in joint, ankle and foot, left     S/P hip replacement, right     Primary osteoarthritis of right hip       PAST SURGICAL HISTORY:   Past Surgical History:   Procedure Laterality Date     ARTHRODESIS ANKLE Left 11/20/2017    Procedure: ARTHRODESIS ANKLE;  LEFT ANKLE FUSION (C-ARM);  Surgeon: Kaveh  "Shaggy BENDER MD;  Location: [facility]     ARTHROPLASTY HIP ANTERIOR Right 5/30/2023    Procedure: Right ARTHROPLASTY HIP TOTAL DIRECT ANTERIOR APPROACH;  Surgeon: Dennis Randle MD;  Location: [facility]     COLONOSCOPY       ORTHOPEDIC SURGERY Right     right TKA May 2021     [facility] AFF PALATE/UVULA SURGERY UNLISTED  age 2 and in 1986    2 procedures for palate/nasal issues       Medications:   Current Outpatient Medications:      acetaminophen (TYLENOL) 325 MG tablet, Take 2 tablets (650 mg) by mouth every 4 hours as needed for other (mild pain), Disp: 100 tablet, Rfl: 0     amLODIPine (NORVASC) 5 MG tablet, Take 1 tablet (5 mg) by mouth daily, Disp: 90 tablet, Rfl: 3     aspirin (ASA) 325 MG EC tablet, Take 1 tablet (325 mg) by mouth daily, Disp: 30 tablet, Rfl: 0     hydrOXYzine (ATARAX) 25 MG tablet, Take 1 tablet (25 mg) by mouth every 6 hours as needed for itching or anxiety (with pain, moderate pain), Disp: 30 tablet, Rfl: 1     ibuprofen (ADVIL/MOTRIN) 200 MG tablet, Take 200 mg by mouth every 4 hours as needed for mild pain, Disp: , Rfl:      oxyCODONE (ROXICODONE) 5 MG tablet, Take 1-2 tablets (5-10 mg) by mouth every 4 hours as needed for moderate to severe pain, Disp: 16 tablet, Rfl: 0     senna-docusate (SENOKOT-S/PERICOLACE) 8.6-50 MG tablet, Take 1-2 tablets by mouth 2 times daily Take while on oral narcotics to prevent or treat constipation., Disp: 30 tablet, Rfl: 0    Allergies: No Known Allergies      REVIEW OF SYSTEMS:  CONSTITUTIONAL:NEGATIVE for fever, chills, night sweats, change in weight  INTEGUMENTARY/SKIN: NEGATIVE for worrisome rashes, moles or lesions  MUSCULOSKELETAL:See HPI above  NEURO: NEGATIVE for weakness, dizziness or paresthesias    PHYSICAL EXAM:  /80   Pulse 81   Ht 1.753 m (5' 9\")   Wt 76.6 kg (168 lb 14.4 oz)   BMI 24.94 kg/m     GENERAL APPEARANCE: healthy, alert, no distress  SKIN: no suspicious lesions or rashes  NEURO: Normal strength and tone, mentation intact and " speech normal  PSYCH:  mentation appears normal and affect normal/bright  RESPIRATORY: No increased work of breathing.    BILATERAL LOWER EXTREMITIES:  Gait: quadriceps avoidance right.    Bilateral calf soft and nttp or squeeze.    right lower extremity:  Intact sensation deep peroneal nerve, superficial peroneal nerve, med/lat tibial nerve, sural nerve, saphenous nerve  Intact EHL, EDL, TA, FHL, GS, quadriceps hamstrings and hip flexors  Toes warm and well perfused, brisk capillary refill. Palpable 2+ dp pulses.  Edema: none    right HIP EXAM:    Surgical dressing partially in place, there is diffuse wetness under the bandage.  Incision: healing well. A little maceration proximally at inguinal fold.  Palpation: Tender:   Incisional, tensor  Strength:  4/5  Special tests:  Irritability (flexion/adduction/internal rotation) negative.  Hip range of motion: normal, all pain free  Leg lengths: grossly symmetric at medial malleolus.      X-RAY:  AP pelvis, lateral views right hip from 6/19/2023 were reviewed in clinic today. No obvious fractures or dislocations. Cemented hip arthroplasty components in place without obvious failure, complication, fracture, or dislocation. Severe left hip degenerative changes.      Impression: 63 year old male seen for postoperative evaluation of right total hip arthroplasty. It has been 3 weeks since surgery. Doing well.      Plan:   * reviewed xrays with patient today showing total hip arthroplasty implants.  * continue DVT prophylaxis for a total of 4 weeks postoperative  * betadine swabs to proximal incision twice daily. Keep wound clean and dry. No soaking in tub/pool.  * wean off all pain medications as tolerated  * xrays next visit: lowset AP pelvis and lateral hip  * return to clinic 4 weeks, sooner if needed.  * all questions addressed and answered prior to discharge from clinic today to patient's satisfaction.  * patient will need longterm (at least 2 years) prophylactic  antibiotics use with dental procedures or other invasive procedures (2 g amoxicilin or  2g Keflex or 500mg azithromycin or clarithromycin or 100mg doxycycline) 30-60 minutes prior to dental procedures.        Dennis Randle M.D., M.S.  Dept. of Orthopaedic Surgery  [facility]            Again, thank you for allowing me to participate in the care of your patient.        Sincerely,        Dennis Randle MD

## 2023-06-20 ENCOUNTER — THERAPY VISIT (OUTPATIENT)
Dept: PHYSICAL THERAPY | Facility: CLINIC | Age: 63
End: 2023-06-20
Attending: ORTHOPAEDIC SURGERY
Payer: COMMERCIAL

## 2023-06-20 DIAGNOSIS — Z96.641 S/P HIP REPLACEMENT, RIGHT: Primary | ICD-10-CM

## 2023-06-20 PROCEDURE — 97110 THERAPEUTIC EXERCISES: CPT | Mod: GP | Performed by: PHYSICAL THERAPIST

## 2023-06-27 ENCOUNTER — THERAPY VISIT (OUTPATIENT)
Dept: PHYSICAL THERAPY | Facility: CLINIC | Age: 63
End: 2023-06-27
Attending: ORTHOPAEDIC SURGERY
Payer: COMMERCIAL

## 2023-06-27 DIAGNOSIS — Z96.641 S/P HIP REPLACEMENT, RIGHT: Primary | ICD-10-CM

## 2023-06-27 PROCEDURE — 97530 THERAPEUTIC ACTIVITIES: CPT | Mod: GP | Performed by: PHYSICAL THERAPIST

## 2023-06-27 PROCEDURE — 97110 THERAPEUTIC EXERCISES: CPT | Mod: GP | Performed by: PHYSICAL THERAPIST

## 2023-06-27 NOTE — PROGRESS NOTES
PLAN  Continue therapy per current plan of care. Will follow up with Dr Randle on 7-17, we have one more PT visit scheduled that day, if doing well(as expected) will discharge then   06/27/23 0500   Appointment Info   Signing clinician's name / credentials Joan Blackburn PT   Total/Authorized Visits E&T plan 6-8   Visits Used 4   Medical Diagnosis S/p 5/30/2023 R GUSTAVO Anterior Approach   PT Tx Diagnosis S/p Right GUSTAVO   Precautions/Limitations R GUSTAVO Anterior Approach - No hip precautions   Progress Note/Certification   Onset of illness/injury or Date of Surgery 05/30/23   Therapy Frequency 1x a day   Predicted Duration 8 weeks   Progress Note Due Date 06/27/23  (MD 7-17)   Progress Note Completed Date 06/05/23   GOALS   PT Goals 2   PT Goal 1   Goal Identifier Walking   Goal Description Will be able to ambulate 0.5 miles without any assistive device or without gait deviation due to his hip (Has limited knee extension from prior TKA so this will impact his gait)   Rationale to maximize safety and independence with performance of ADLs and functional tasks;to maximize safety and independence within the home;to maximize safety and independence within the community;to maximize safety and independence with transportation;to maximize safety and independence with self cares   Goal Progress ambulating with SE cane 10' inside or outside 3x/day, in the house short distances without the cane. lacks TKE on R due to 2021 R TKA   Target Date 07/31/23   PT Goal 2   Goal Identifier Squatting   Goal Description Will be able to squat and pick something off the ground without weakness, balance deficit or inability to perform due to the right hip   Rationale to maximize safety and independence with performance of ADLs and functional tasks;to maximize safety and independence within the home;to maximize safety and independence within the community;to maximize safety and independence with transportation;to maximize safety and  "independence with self cares   Goal Progress uses a gripper at home, has not tried a squat to the floor yet, able to do  mini squat in clinic today   Target Date 07/31/23   Subjective Report   Subjective Report States the R hip continues to improve. He is still walking 3x/day about 10 min at a time in the house . The L hip has been achey for the past couple days Continues to use the cane when the L hip bothers him, not for the R hip. If the L hip is having a good day he will not use the cane. Doing well with stairs as far as the R hip. The R knee extension stretch is also helping.   Objective Measures   Objective Measures Objective Measure 1;Objective Measure 2;Objective Measure 3;Objective Measure 4   Objective Measure 1   Objective Measure Sit to stand   Details 20 sit to stands unassisted from chair   Objective Measure 2   Objective Measure SLR Endurance   Details 25 (No anterior hip pain)-, abd 20 with fatigue   Objective Measure 3   Objective Measure R knee extension lacks 15 degrees active and 13 degrees passive   Details impairs gait mechanics, when he finished up with his R TK rehab in May 2021 a chart review reveals he lacked 4 deg of extension, given home stretching program to work on   Objective Measure 4   Objective Measure ROM R hip   Details AA flx R hip 100 degrees with slight ache   Treatment Interventions (PT)   Interventions Therapeutic Procedure/Exercise;Neuromuscular Re-education;Therapeutic Activity   Therapeutic Procedure/Exercise   Therapeutic Procedures: strength, endurance, ROM, flexibillity minutes (31902) 30   Ther Proc 1 Bike upright s=5   Ther Proc 1 - Details 5' for warm up   PTRx Ther Proc 1 Passive Knee Extension Stretch   PTRx Ther Proc 1 - Details  by PT 2s 10R 10\" hold review of HEP 5lb laptop over knee 10' 2x/day and then ad 2 session to seated post pressure 10x 10\"   PTRx Ther Proc 2 Squat   PTRx Ther Proc 2 - Details 2s 10R  still cues for butt back and  mini squat for now HEP " "1x/day 1-2s of 10-15R   PTRx Ther Proc 3 Ankle Pumps in Long Sitting   PTRx Ther Proc 3 - Details still does as part of HEP several times a day   PTRx Ther Proc 4 Bridging #1   PTRx Ther Proc 4 - Details 1s 25R good form HEP 1x/day work to 25R   PTRx Ther Proc 5 Hip Flexion Straight Leg Raise   PTRx Ther Proc 5 - Details 1s 25R cues for slow TA breathe HEP 1x/day work to 25R   PTRx Ther Proc 6 Hip Abduction Straight Leg Raise   PTRx Ther Proc 6 - Details 1x20 fatigued cues  for forward hip HEP 1x/day work to 25R    PTRx Ther Proc 7 Toe Raises   PTRx Ther Proc 7 - Details 1x25 B dec on L due to ankle fusion-keep most wt on R as tolerated  HEP 1x/day work to 30R   PTRx Ther Proc 8 Standing Gastroc Stretch   PTRx Ther Proc 8 - Details R 2x 15\" did not feel on L due to ankle fusion HEP 1-2x/day 15-30 sec hold   Skilled Intervention selection of exercises and instruction, cues   Patient Response/Progress tolerated well with fatigue, R knee exten has improved slightly   Therapeutic Activity   Therapeutic Activities: dynamic activities to improve functional performance minutes (91826) 8   PTRx Ther Act 1 Sit to Stand   PTRx Ther Act 1 - Details 1s 20R HEP 1x/day 20R   PTRx Ther Act 2 Sidestep   PTRx Ther Act 2 - Details 2s x20 steps each direction with cues for posture and toes forward HEP 1x/day  to fatigue   PTRx Ther Act 3 Forward Step   PTRx Ther Act 3 - Details 1x15 forward up 6\" step HEP using steps at home as needed- does about 2-3x/day   PTRx Ther Act 4 Stepdown Forward   PTRx Ther Act 4 - Details 1x15 with 6\" step just using regular steps at H 2-3x/day   Neuromuscular Re-education   Neuromuscular re-ed of mvmt, balance, coord, kinesthetic sense, posture, proprioception minutes (10448) 5   PTRx Neuro Re-ed 1 Tandem Stance Static With Chair   PTRx Neuro Re-ed 1 - Details 1s 60\" B cue for upright posture with many touches to stabilize then equal stance on airex and then shift wt f/b and s/s then challenge with OP by " PT to trunk 1s 3'  without touches HEP 1-2x/day 2R each 15-30 sec   Education   Learner/Method Patient;Listening;Demonstration;Pictures/Video   Plan   Home program Ptrx updated and reviewed with Case   Updates to plan of care Appropriate to continue created POC   Plan for next session continue to progress balance and strength, poss d/c after next visit(several wks out)   Total Session Time   Timed Code Treatment Minutes 43   Total Treatment Time (sum of timed and untimed services) 43       Beginning/End Dates of Progress Note Reporting Period:  06/05/23 to 06/27/2023    Referring Provider:  Dennis Randle

## 2023-07-13 NOTE — PROGRESS NOTES
PROGRESS  REPORT    Progress reporting period is from 7/5/2021 to 8/9/2021.       SUBJECTIVE  Subjective changes noted by patient: Pt reports that his R knee is doing well overall. Stairs continue to be slightly challenging, as well as picking up items from the floor. He feels like his balance is getting better. He also reports that he is driving a bit more and this is going well.  Current pain level is 0/10.     Changes in function:  Yes (See Goal flowsheet attached for changes in current functional level)  Adverse reaction to treatment or activity: None    OBJECTIVE  Changes noted in objective findings:  Yes, improved R knee ROM    KNEE: (* indicates patient's primary complaint)   PROM R PROM L AROM R AROM L MMT R MMT L   Ext   0-4  5 5   Flx   120  5 5     Quadriceps Muscle Activation Right Left   Isometric Quad Activation Normal Normal   Straight Leg Raising No extensor lag No extensor lag     30 sec STS: 11 reps w/ use of B UE to aid in standing    Single Leg Stance:   R: 3 sec    L: 3 sec    ASSESSMENT/PLAN  Updated problem list and treatment plan: Diagnosis 1:  R knee pain s/p R TKA    Pain -  self management and home program  Decreased ROM/flexibility - therapeutic exercise and home program  Decreased strength - therapeutic exercise, therapeutic activities and home program  Impaired balance - neuro re-education, therapeutic activities and home program    STG/LTGs have been met or progress has been made towards goals:  Yes (See Goal flow sheet completed today.)  Assessment of Progress: The patient's condition is improving.  Patient is meeting short term goals and is progressing towards long term goals.    Self Management Plans:  Patient is independent in a home treatment program.  Patient is independent in self management of symptoms.  I have re-evaluated this patient and find that the nature, scope, duration and intensity of the therapy is appropriate for the medical condition of the patient.  Case  continues to require the following intervention to meet STG and LTG's:  PT intervention is no longer required to meet STG/LTG.    Recommendations:  This patient is ready to be discharged from therapy and continue their home treatment program.    Please refer to the daily flowsheet for treatment today, total treatment time and time spent performing 1:1 timed codes.         No

## 2023-07-14 NOTE — PROGRESS NOTES
CHIEF COMPLAINT:   Chief Complaint   Patient presents with     Right Hip - Total Joint Post-op     Right total hip arthroplasty. DOS: 5/30/23. Post-op 7 weeks tomorrow.Doing great. No pain. Ready to plan the left total hip arthroplasty.          SURGERY: Total hip arthroplasty, right hip, anterior approach ([facility])  DATE OF SURGERY: 5/30/2023      HISTORY OF PRESENT ILLNESS: Case Epstein is a 63 year old male seen for postoperative evaluation of right total hip arthroplasty. It has been 7 weeks since surgery. Returns today stating doing well, no pain. Denies any wound problems. Denies numbness, tingling, or weakness in the affected extremity. Denies fevers chills night sweats.     He'd like to get planning on his left hip replacement possibly next month.    Denies perceived limb length inequality.      Pain severity: 0/10 right hip.      PAST MEDICAL HISTORY:   Past Medical History:   Diagnosis Date     High cholesterol      OA (OSTEOARTHRITIS) OF KNEE - right 9/27/2011     Patient Active Problem List   Diagnosis     Primary osteoarthritis of right knee     Vitamin D deficiency disease     Advanced directives, counseling/discussion     CARDIOVASCULAR SCREENING; LDL GOAL LESS THAN 100     Pain in joint, ankle and foot, left     S/P hip replacement, right     Primary osteoarthritis of right hip       PAST SURGICAL HISTORY:   Past Surgical History:   Procedure Laterality Date     ARTHRODESIS ANKLE Left 11/20/2017    Procedure: ARTHRODESIS ANKLE;  LEFT ANKLE FUSION (C-ARM);  Surgeon: Shaggy Linn MD;  Location: [facility]     ARTHROPLASTY HIP ANTERIOR Right 5/30/2023    Procedure: Right ARTHROPLASTY HIP TOTAL DIRECT ANTERIOR APPROACH;  Surgeon: Dennis Randle MD;  Location: [facility]     COLONOSCOPY       ORTHOPEDIC SURGERY Right     right TKA May 2021     [facility] AFF PALATE/UVULA SURGERY UNLISTED  age 2 and in 1986    2 procedures for palate/nasal issues       Medications:   Current  "Outpatient Medications:      acetaminophen (TYLENOL) 325 MG tablet, Take 2 tablets (650 mg) by mouth every 4 hours as needed for other (mild pain), Disp: 100 tablet, Rfl: 0     amLODIPine (NORVASC) 5 MG tablet, Take 1 tablet (5 mg) by mouth daily, Disp: 90 tablet, Rfl: 3     aspirin (ASA) 325 MG EC tablet, Take 1 tablet (325 mg) by mouth daily, Disp: 30 tablet, Rfl: 0     ibuprofen (ADVIL/MOTRIN) 200 MG tablet, Take 200 mg by mouth every 4 hours as needed for mild pain (Patient not taking: Reported on 6/19/2023), Disp: , Rfl:     Allergies: No Known Allergies      REVIEW OF SYSTEMS:  CONSTITUTIONAL:NEGATIVE for fever, chills, night sweats, change in weight  INTEGUMENTARY/SKIN: NEGATIVE for worrisome rashes, moles or lesions  MUSCULOSKELETAL:See HPI above  NEURO: NEGATIVE for weakness, dizziness or paresthesias    PHYSICAL EXAM:  Ht 1.753 m (5' 9\")   Wt 76.2 kg (168 lb)   BMI 24.81 kg/m     GENERAL APPEARANCE: healthy, alert, no distress  SKIN: no suspicious lesions or rashes  NEURO: Normal strength and tone, mentation intact and speech normal  PSYCH:  mentation appears normal and affect normal/bright  RESPIRATORY: No increased work of breathing.    BILATERAL LOWER EXTREMITIES:  Gait: quadriceps avoidance right.    Bilateral calf soft and nttp or squeeze.    right lower extremity:  Intact sensation deep peroneal nerve, superficial peroneal nerve, med/lat tibial nerve, sural nerve, saphenous nerve  Intact EHL, EDL, TA, FHL, GS, quadriceps hamstrings and hip flexors  Toes warm and well perfused, brisk capillary refill. Palpable 2+ dp pulses.  Edema: none    right HIP EXAM:    Surgical dressing partially in place, there is diffuse wetness under the bandage.  Incision: healing well. A little maceration proximally at inguinal fold.  Palpation: Tender:   Incisional, tensor  Strength:  4/5  Special tests:  Irritability (flexion/adduction/internal rotation) negative.  Hip range of motion: normal, all pain free  Leg " lengths: grossly symmetric at medial malleolus.      X-RAY:  AP pelvis, lateral views right hip from 7/17/2023 were reviewed in clinic today. No obvious fractures or dislocations. Cemented hip arthroplasty components in place without obvious failure, complication, fracture, or dislocation. Severe left hip degenerative changes.      Impression: 63 year old male seen for postoperative evaluation of right total hip arthroplasty. It has been 7 weeks since surgery. Doing well. Chronic left hip pain, advanced left hip primary osteoarthritis.      Plan:   * reviewed xrays with patient today showing right total hip arthroplasty implants.  * will plan left total hip arthroplasty in the near future  * will need H+P prior to surgery left hip.  * return to clinic 2 weeks postoperative.    Patient states he will not need any new prescriptions after surgery (oxycodone, hyroxyzine, acetaminophen) as he never used them from the first surgery. He will only need aspirin postoperative prescription.      Dennis Randle M.D., M.S.  Dept. of Orthopaedic Surgery  [facility]

## 2023-07-17 ENCOUNTER — OFFICE VISIT (OUTPATIENT)
Dept: ORTHOPEDICS | Facility: CLINIC | Age: 63
End: 2023-07-17
Payer: COMMERCIAL

## 2023-07-17 ENCOUNTER — THERAPY VISIT (OUTPATIENT)
Dept: PHYSICAL THERAPY | Facility: CLINIC | Age: 63
End: 2023-07-17
Payer: COMMERCIAL

## 2023-07-17 ENCOUNTER — TELEPHONE (OUTPATIENT)
Dept: SURGERY | Facility: CLINIC | Age: 63
End: 2023-07-17

## 2023-07-17 ENCOUNTER — ANCILLARY PROCEDURE (OUTPATIENT)
Dept: GENERAL RADIOLOGY | Facility: CLINIC | Age: 63
End: 2023-07-17
Attending: PHYSICIAN ASSISTANT
Payer: COMMERCIAL

## 2023-07-17 VITALS — WEIGHT: 168 LBS | BODY MASS INDEX: 24.88 KG/M2 | HEIGHT: 69 IN

## 2023-07-17 DIAGNOSIS — Z96.641 S/P HIP REPLACEMENT, RIGHT: Primary | ICD-10-CM

## 2023-07-17 DIAGNOSIS — M16.12 PRIMARY OSTEOARTHRITIS OF LEFT HIP: ICD-10-CM

## 2023-07-17 DIAGNOSIS — Z96.641 S/P TOTAL RIGHT HIP ARTHROPLASTY: Primary | ICD-10-CM

## 2023-07-17 DIAGNOSIS — Z96.641 S/P TOTAL RIGHT HIP ARTHROPLASTY: ICD-10-CM

## 2023-07-17 PROCEDURE — 97530 THERAPEUTIC ACTIVITIES: CPT | Mod: GP | Performed by: PHYSICAL THERAPIST

## 2023-07-17 PROCEDURE — 97110 THERAPEUTIC EXERCISES: CPT | Mod: GP | Performed by: PHYSICAL THERAPIST

## 2023-07-17 PROCEDURE — 99024 POSTOP FOLLOW-UP VISIT: CPT | Performed by: ORTHOPAEDIC SURGERY

## 2023-07-17 PROCEDURE — 73502 X-RAY EXAM HIP UNI 2-3 VIEWS: CPT | Mod: TC | Performed by: RADIOLOGY

## 2023-07-17 RX ORDER — TRANEXAMIC ACID 650 MG/1
1950 TABLET ORAL ONCE
Status: CANCELLED | OUTPATIENT
Start: 2023-07-17 | End: 2023-07-17

## 2023-07-17 ASSESSMENT — ACTIVITIES OF DAILY LIVING (ADL)
HOS_ADL_COUNT: 13
WALKING_DOWN_STEEP_HILLS: MODERATE DIFFICULTY
WALKING_APPROXIMATELY_10_MINUTES: NO DIFFICULTY AT ALL
WALKING_INITIALLY: SLIGHT DIFFICULTY
ROLLING_OVER_IN_BED: NO DIFFICULTY AT ALL
WALKING_15_MINUTES_OR_GREATER: SLIGHT DIFFICULTY
SITTING_FOR_15_MINUTES: NO DIFFICULTY AT ALL
WALKING_UP_STEEP_HILLS: SLIGHT DIFFICULTY
RECREATIONAL_ACTIVITIES: NO DIFFICULTY AT ALL
HOS_ADL_ITEM_SCORE_TOTAL: 39
HOS_ADL_HIGHEST_POTENTIAL_SCORE: 52
STANDING_FOR_15_MINUTES: SLIGHT DIFFICULTY
GOING_UP_1_FLIGHT_OF_STAIRS: SLIGHT DIFFICULTY
GOING_DOWN_1_FLIGHT_OF_STAIRS: SLIGHT DIFFICULTY
PUTTING_ON_SOCKS_AND_SHOES: SLIGHT DIFFICULTY
GETTING_INTO_AND_OUT_OF_AN_AVERAGE_CAR: SLIGHT DIFFICULTY
LIGHT_TO_MODERATE_WORK: NO DIFFICULTY AT ALL
DEEP_SQUATTING: UNABLE TO DO

## 2023-07-17 ASSESSMENT — PAIN SCALES - GENERAL: PAINLEVEL: NO PAIN (0)

## 2023-07-17 NOTE — LETTER
7/17/2023         RE: Case Epstein  [address] [place] [address]  [place] [id number]        Dear Colleague,    Thank you for referring your patient, Case Epstein, to the [facility]. Please see a copy of my visit note below.      CHIEF COMPLAINT:   Chief Complaint   Patient presents with     Right Hip - Total Joint Post-op     Right total hip arthroplasty. DOS: 5/30/23. Post-op 7 weeks tomorrow.Doing great. No pain. Ready to plan the left total hip arthroplasty.          SURGERY: Total hip arthroplasty, right hip, anterior approach ([facility])  DATE OF SURGERY: 5/30/2023      HISTORY OF PRESENT ILLNESS: Case Epstein is a 63 year old male seen for postoperative evaluation of right total hip arthroplasty. It has been 7 weeks since surgery. Returns today stating doing well, no pain. Denies any wound problems. Denies numbness, tingling, or weakness in the affected extremity. Denies fevers chills night sweats.     He'd like to get planning on his left hip replacement possibly next month.    Denies perceived limb length inequality.      Pain severity: 0/10 right hip.      PAST MEDICAL HISTORY:   Past Medical History:   Diagnosis Date     High cholesterol      OA (OSTEOARTHRITIS) OF KNEE - right 9/27/2011     Patient Active Problem List   Diagnosis     Primary osteoarthritis of right knee     Vitamin D deficiency disease     Advanced directives, counseling/discussion     CARDIOVASCULAR SCREENING; LDL GOAL LESS THAN 100     Pain in joint, ankle and foot, left     S/P hip replacement, right     Primary osteoarthritis of right hip       PAST SURGICAL HISTORY:   Past Surgical History:   Procedure Laterality Date     ARTHRODESIS ANKLE Left 11/20/2017    Procedure: ARTHRODESIS ANKLE;  LEFT ANKLE FUSION (C-ARM);  Surgeon: Shaggy Linn MD;  Location: [facility]     ARTHROPLASTY HIP ANTERIOR Right 5/30/2023    Procedure: Right ARTHROPLASTY HIP TOTAL DIRECT ANTERIOR  "APPROACH;  Surgeon: Dennis Randle MD;  Location: [facility]     COLONOSCOPY       ORTHOPEDIC SURGERY Right     right TKA May 2021     [facility] AFF PALATE/UVULA SURGERY UNLISTED  age 2 and in 1986    2 procedures for palate/nasal issues       Medications:   Current Outpatient Medications:      acetaminophen (TYLENOL) 325 MG tablet, Take 2 tablets (650 mg) by mouth every 4 hours as needed for other (mild pain), Disp: 100 tablet, Rfl: 0     amLODIPine (NORVASC) 5 MG tablet, Take 1 tablet (5 mg) by mouth daily, Disp: 90 tablet, Rfl: 3     aspirin (ASA) 325 MG EC tablet, Take 1 tablet (325 mg) by mouth daily, Disp: 30 tablet, Rfl: 0     ibuprofen (ADVIL/MOTRIN) 200 MG tablet, Take 200 mg by mouth every 4 hours as needed for mild pain (Patient not taking: Reported on 6/19/2023), Disp: , Rfl:     Allergies: No Known Allergies      REVIEW OF SYSTEMS:  CONSTITUTIONAL:NEGATIVE for fever, chills, night sweats, change in weight  INTEGUMENTARY/SKIN: NEGATIVE for worrisome rashes, moles or lesions  MUSCULOSKELETAL:See HPI above  NEURO: NEGATIVE for weakness, dizziness or paresthesias    PHYSICAL EXAM:  Ht 1.753 m (5' 9\")   Wt 76.2 kg (168 lb)   BMI 24.81 kg/m     GENERAL APPEARANCE: healthy, alert, no distress  SKIN: no suspicious lesions or rashes  NEURO: Normal strength and tone, mentation intact and speech normal  PSYCH:  mentation appears normal and affect normal/bright  RESPIRATORY: No increased work of breathing.    BILATERAL LOWER EXTREMITIES:  Gait: quadriceps avoidance right.    Bilateral calf soft and nttp or squeeze.    right lower extremity:  Intact sensation deep peroneal nerve, superficial peroneal nerve, med/lat tibial nerve, sural nerve, saphenous nerve  Intact EHL, EDL, TA, FHL, GS, quadriceps hamstrings and hip flexors  Toes warm and well perfused, brisk capillary refill. Palpable 2+ dp pulses.  Edema: none    right HIP EXAM:    Surgical dressing partially in place, there is diffuse wetness under the " bandage.  Incision: healing well. A little maceration proximally at inguinal fold.  Palpation: Tender:   Incisional, tensor  Strength:  4/5  Special tests:  Irritability (flexion/adduction/internal rotation) negative.  Hip range of motion: normal, all pain free  Leg lengths: grossly symmetric at medial malleolus.      X-RAY:  AP pelvis, lateral views right hip from 7/17/2023 were reviewed in clinic today. No obvious fractures or dislocations. Cemented hip arthroplasty components in place without obvious failure, complication, fracture, or dislocation. Severe left hip degenerative changes.      Impression: 63 year old male seen for postoperative evaluation of right total hip arthroplasty. It has been 7 weeks since surgery. Doing well. Chronic left hip pain, advanced left hip primary osteoarthritis.      Plan:   * reviewed xrays with patient today showing right total hip arthroplasty implants.  * will plan left total hip arthroplasty in the near future  * will need H+P prior to surgery left hip.  * return to clinic 2 weeks postoperative.    Patient states he will not need any new prescriptions after surgery (oxycodone, hyroxyzine, acetaminophen) as he never used them from the first surgery. He will only need aspirin postoperative prescription.      Dennis Randle M.D., M.S.  Dept. of Orthopaedic Surgery  [facility]            Again, thank you for allowing me to participate in the care of your patient.        Sincerely,        Dennis Randle MD

## 2023-07-17 NOTE — PROGRESS NOTES
DISCHARGE  Reason for Discharge: patient had progressed well regarding R hip    Equipment Issued: theraband    Discharge Plan: Patient to continue home program.   07/17/23 0500   Appointment Info   Signing clinician's name / credentials Joan Blackburn PT   Total/Authorized Visits E&T plan 6-8   Visits Used 5   Medical Diagnosis S/p 5/30/2023 R GUSTAVO Anterior Approach   PT Tx Diagnosis S/p Right GUSTAVO   Precautions/Limitations R GUSTAVO Anterior Approach - No hip precautions   Progress Note/Certification   Onset of illness/injury or Date of Surgery 05/30/23   Therapy Frequency 1x a day   Predicted Duration 8 weeks   Progress Note Due Date 07/17/23   Progress Note Completed Date 7-17-23   GOALS   PT Goals 2   PT Goal 1   Goal Identifier Walking   Goal Description Will be able to ambulate 0.5 miles without any assistive device or without gait deviation due to his hip (Has limited knee extension from prior TKA so this will impact his gait)   Rationale to maximize safety and independence with performance of ADLs and functional tasks;to maximize safety and independence within the home;to maximize safety and independence within the community;to maximize safety and independence with transportation;to maximize safety and independence with self cares   Goal Progress ambulating with SE cane in R hand now to support the L hip,  outside 3x/day for 10 min(slightly less than 1/2 mile at a time), in the house short distances without the cane. lacks TKE on R due to 2021 R TKA  (continue to work on)   Target Date 07/31/23   PT Goal 2   Goal Identifier Squatting   Goal Description Will be able to squat and pick something off the ground without weakness, balance deficit or inability to perform due to the right hip   Rationale to maximize safety and independence with performance of ADLs and functional tasks;to maximize safety and independence within the home;to maximize safety and independence within the community;to maximize safety and  independence with transportation;to maximize safety and independence with self cares   Goal Progress not limited from R hip, is limited by the L hip now  (continue to work on)   Target Date 07/31/23   Subjective Report   Subjective Report Saw Dr Randle today, doing well regarding R GUTSAVO and and is tentatively set to have the L hip done in late August. No pain R hip, feels he can do all he needs to with the R side. Making it difficult now is the L hip (ave daily PL 2/10).   Objective Measures   Objective Measures Objective Measure 1;Objective Measure 2;Objective Measure 3;Objective Measure 4;Objective Measure 5   Objective Measure 1   Objective Measure Sit to stand   Details 20 sit to stands unassisted from chair   Objective Measure 2   Objective Measure SLR Endurance   Details 30 (No anterior hip pain)-, abd 30 with fatigue of both   Objective Measure 3   Objective Measure R knee extension lacks 12 degrees active and 10 degrees passive   Details improving though still impairs gait mechanics, when he finished up with his R TK rehab in May 2021 a chart review reveals he lacked 4 deg of extension, given home stretching program to work on-he is doing 2x/day, 1 with 6lb wt   Objective Measure 4   Objective Measure ROM R hip   Details AA flx R hip 105 degrees with mild pain   Objective Measure 5   Objective Measure Strength   Details hip flx R 5/5, L 4+/5, B quad and HS 5/5, hip abd has been able to progress reps of HEP   Treatment Interventions (PT)   Interventions Therapeutic Procedure/Exercise;Neuromuscular Re-education;Therapeutic Activity;Manual Therapy   Therapeutic Procedure/Exercise   Therapeutic Procedures: strength, endurance, ROM, flexibillity minutes (81479) 30   Ther Proc 1 Bike upright s=5   Ther Proc 1 - Details 7' for warm up   PTRx Ther Proc 1 Rowing   PTRx Ther Proc 1 - Details  1s 15R with blue band cues for TA ret/dep rationale for better core activation and control of scap stab mm's HEP 3-5x/wk 15-30 R  "  PTRx Ther Proc 2 Passive Knee Extension Stretch   PTRx Ther Proc 2 - Details  by PT 2s 10R 10\" hold review of HEP 5lb laptop over knee 10' 2x/day and then add 2 session of seated post pressure 10x 10\"   PTRx Ther Proc 3 Squat   PTRx Ther Proc 3 - Details 2s 15R  form is good(L ankle fused) just mini squat  HEP 5x/wk 1-2s of 10-15R   PTRx Ther Proc 4 Ankle Pumps in Long Sitting   PTRx Ther Proc 4 - Details still does as part of HEP several times a day(he wants to continue this)   PTRx Ther Proc 5 Bridging #1   PTRx Ther Proc 5 - Details 1s 30R good form HEP 5x/wk 30R can put wt over belly now   PTRx Ther Proc 6 Hip Flexion Straight Leg Raise   PTRx Ther Proc 6 - Details 1s 30R cues for slow TA breathe HEP 5x/wk 30R both legs   PTRx Ther Proc 7 Hip Abduction Straight Leg Raise   PTRx Ther Proc 7 - Details 1x30 fatigued cues  for forward hip HEP 5x/wk both legs 30R   PTRx Ther Proc 8 Toe Raises   PTRx Ther Proc 8 - Details 1x25 B-same most wt on R dec on L due to ankle fusion-keep most wt on R as tolerated  HEP 1x/day work to 30R   PTRx Ther Proc 9 Standing Gastroc Stretch   PTRx Ther Proc 9 - Details R 2x 15\" did not feel on L due to ankle fusion HEP 1-2x/day 15-30 sec hold   Skilled Intervention selection of exercises and instruction, cues   Patient Response/Progress tolerated well with fatigue, R knee exten has improved slightly   Therapeutic Activity   Therapeutic Activities: dynamic activities to improve functional performance minutes (97507) 8   PTRx Ther Act 1 Sit to Stand   PTRx Ther Act 1 - Details 20R without difficulty HEP 5x/wk 20R   PTRx Ther Act 2 Sidestep   PTRx Ther Act 2 - Details 2s x25 steps each direction with cues for posture and toes forward HEP 5x/wk  to fatigue   PTRx Ther Act 3 Forward Step   PTRx Ther Act 3 - Details 2x10 forward up 6\" step HEP using step at home 5x/wk  (asc desc clinic stairwell stairs, did well down, up slight difficulty on R)   Neuromuscular Re-education   Neuromuscular " "re-ed of mvmt, balance, coord, kinesthetic sense, posture, proprioception minutes (05316) 3   PTRx Neuro Re-ed 1 Tandem Stance Static With Chair   PTRx Neuro Re-ed 1 - Details 1s 60\" B cue for upright posture with fingertip touches to stabilize SLS floor each foot over 30 sec with fingertip touch  equal stance on BOSU with fingertip touch B continue balance for HEP 2x/day   Manual Therapy   Manual Therapy 1 discussed self scar mobilization with rationale, brief demo in clinic today   Education   Learner/Method Patient;Listening;Demonstration;Pictures/Video   Plan   Home program Ptrx updated and reviewed with Case   Updates to plan of care ready for d/c   Plan for next session continue with HEP consistently to prepare for L GUSTAVO   Total Session Time   Timed Code Treatment Minutes 41   Total Treatment Time (sum of timed and untimed services) 41       Referring Provider:  Dennis Randle    "

## 2023-07-18 NOTE — TELEPHONE ENCOUNTER
Type of surgery: ARTHROPLASTY, HIP, TOTAL, DIRECT ANTERIOR APPROACH (Left)      Location of surgery: [place] [facility]  Date and time of surgery: 08/22/2023  Surgeon: nathan  Pre-Op Appt Date: 08/14/2023  Post-Op Appt Date: 09/06/2023   Packet sent out: Yes  Pre-cert/Authorization completed:  No  Date:

## 2023-08-08 NOTE — PROGRESS NOTES
Ortho Navigator Note      Pre-op Date 8/14/23     Medical Clearance  Cleared     Labs WNR     COVID Test Date No longer indicated      Skin  Intact      Activity: Ambulates independently with straight cane     Equipment Need Patient will bring a walker for discharge. Defer to PT/OT for recs.       Meds to Hold Held all supplements 14 days prior to surgery  Hold ibuprofen 48 hr prior to surgery   Hold ASA for 7 days prior to surgery     * Medication recommendations are not intended to be exhaustive; they are limited to common medications that are potentially dangerous if incorrectly managed   NPO Instructions  Defer to PAN RN     Pre-op Joint Education Complete? Complete   Discharge Plan Patient has plan to discharge home on morning of POD 1.   Friend Liseth will arrive at hospital at 0800 to participate in therapy and discharge education. They will then transport patient home    /Transportation Liseth will be .  is physically able to care for patient.      Barriers to Discharge No barriers to discharge.      Additional Info/   Special Needs : Patient had no unanswered questions or concerns.           08/08/23 1502   Discharge Planning   Patient/Family Anticipates Transition to home;home with family   Concerns to be Addressed no discharge needs identified   Living Arrangements   People in Home alone   Is your private residence a single family home or apartment? Single family home   Number of Stairs, Within Home, Primary seven   Stair Railings, Within Home, Primary railings on both sides of stairs   Once home, are you able to live on one level? No   Which level? Upper Level   Bathroom Shower/Tub Tub/Shower unit   Equipment Currently Used at Home cane, straight;raised toilet seat   Support System   Do you have someone available to stay with you one or two nights once you are home? Yes   Medical Clearance   Date of Physical 08/14/23   Cl[facility] Name [facility]   It is recommended that you call and check  with any specialty providers before surgery to see if you need surgical clearance.  Do you see any specialty providers outside of your primary care provider? No   Blood   Known Bleeding Disorder or Coagulopathy No   Does the patient have any Catholic/cultural preferences related to blood products? No   Education   Has the patient scheduled or completed pre-op total joint education, either in class or online, in the last 12 months? No   Patient attended total joint pre-op class/received pre-op teaching  online

## 2023-08-14 ENCOUNTER — OFFICE VISIT (OUTPATIENT)
Dept: FAMILY MEDICINE | Facility: CLINIC | Age: 63
End: 2023-08-14
Payer: COMMERCIAL

## 2023-08-14 VITALS
OXYGEN SATURATION: 96 % | WEIGHT: 178.25 LBS | DIASTOLIC BLOOD PRESSURE: 72 MMHG | RESPIRATION RATE: 16 BRPM | HEART RATE: 87 BPM | HEIGHT: 69 IN | SYSTOLIC BLOOD PRESSURE: 114 MMHG | TEMPERATURE: 98 F | BODY MASS INDEX: 26.4 KG/M2

## 2023-08-14 DIAGNOSIS — M16.12 PRIMARY OSTEOARTHRITIS OF LEFT HIP: ICD-10-CM

## 2023-08-14 DIAGNOSIS — Z01.818 PREOP GENERAL PHYSICAL EXAM: Primary | ICD-10-CM

## 2023-08-14 LAB
ALBUMIN SERPL BCG-MCNC: 4.5 G/DL (ref 3.5–5.2)
ALP SERPL-CCNC: 83 U/L (ref 40–129)
ALT SERPL W P-5'-P-CCNC: 6 U/L (ref 0–70)
ANION GAP SERPL CALCULATED.3IONS-SCNC: 9 MMOL/L (ref 7–15)
AST SERPL W P-5'-P-CCNC: 19 U/L (ref 0–45)
BASOPHILS # BLD AUTO: 0 10E3/UL (ref 0–0.2)
BASOPHILS NFR BLD AUTO: 0 %
BILIRUB SERPL-MCNC: 0.3 MG/DL
BUN SERPL-MCNC: 15.9 MG/DL (ref 8–23)
CALCIUM SERPL-MCNC: 9.5 MG/DL (ref 8.8–10.2)
CHLORIDE SERPL-SCNC: 105 MMOL/L (ref 98–107)
CREAT SERPL-MCNC: 0.79 MG/DL (ref 0.67–1.17)
DEPRECATED HCO3 PLAS-SCNC: 27 MMOL/L (ref 22–29)
EOSINOPHIL # BLD AUTO: 0 10E3/UL (ref 0–0.7)
EOSINOPHIL NFR BLD AUTO: 0 %
ERYTHROCYTE [DISTWIDTH] IN BLOOD BY AUTOMATED COUNT: 13.2 % (ref 10–15)
GFR SERPL CREATININE-BSD FRML MDRD: >90 ML/MIN/1.73M2
GLUCOSE SERPL-MCNC: 99 MG/DL (ref 70–99)
HCT VFR BLD AUTO: 43.4 % (ref 40–53)
HGB BLD-MCNC: 13.6 G/DL (ref 13.3–17.7)
IMM GRANULOCYTES # BLD: 0 10E3/UL
IMM GRANULOCYTES NFR BLD: 0 %
LYMPHOCYTES # BLD AUTO: 1.3 10E3/UL (ref 0.8–5.3)
LYMPHOCYTES NFR BLD AUTO: 19 %
MCH RBC QN AUTO: 27.6 PG (ref 26.5–33)
MCHC RBC AUTO-ENTMCNC: 31.3 G/DL (ref 31.5–36.5)
MCV RBC AUTO: 88 FL (ref 78–100)
MONOCYTES # BLD AUTO: 0.8 10E3/UL (ref 0–1.3)
MONOCYTES NFR BLD AUTO: 11 %
NEUTROPHILS # BLD AUTO: 4.6 10E3/UL (ref 1.6–8.3)
NEUTROPHILS NFR BLD AUTO: 69 %
PLATELET # BLD AUTO: 208 10E3/UL (ref 150–450)
POTASSIUM SERPL-SCNC: 4.7 MMOL/L (ref 3.4–5.3)
PROT SERPL-MCNC: 6.8 G/DL (ref 6.4–8.3)
RBC # BLD AUTO: 4.93 10E6/UL (ref 4.4–5.9)
SODIUM SERPL-SCNC: 141 MMOL/L (ref 136–145)
WBC # BLD AUTO: 6.8 10E3/UL (ref 4–11)

## 2023-08-14 PROCEDURE — 85025 COMPLETE CBC W/AUTO DIFF WBC: CPT | Performed by: FAMILY MEDICINE

## 2023-08-14 PROCEDURE — 36415 COLL VENOUS BLD VENIPUNCTURE: CPT | Performed by: FAMILY MEDICINE

## 2023-08-14 PROCEDURE — 80053 COMPREHEN METABOLIC PANEL: CPT | Performed by: FAMILY MEDICINE

## 2023-08-14 PROCEDURE — 99214 OFFICE O/P EST MOD 30 MIN: CPT | Performed by: FAMILY MEDICINE

## 2023-08-14 ASSESSMENT — PAIN SCALES - GENERAL: PAINLEVEL: NO PAIN (0)

## 2023-08-14 NOTE — PATIENT INSTRUCTIONS
Hold aspirin, ibuprofen, and naproxen type meds the week prior    Tylenol ( acetaminophen ) okay that week    Take amlodipine early am of procedure with sip of water    We will send you lab results

## 2023-08-14 NOTE — H&P (VIEW-ONLY)
[facility]  [address] [place] [address]  [place] [address]  Phone: [phone]  Primary Provider: Rere Campuzano  Pre-op Performing Provider: RERE CAMPUZANO      PREOPERATIVE EVALUATION:  Today's date: 8/14/2023    Case Epstein is a 63 year old male who presents for a preoperative evaluation.      8/14/2023     9:31 AM   Additional Questions   Roomed by Zoe Plata       Surgical Information:  Surgery/Procedure: Left total hip replacment  Surgery Location: [place]  Surgeon: Jer  Surgery Date: 08/22/2023  Time of Surgery: 10:30am  Where patient plans to recover: At home with family  Fax number for surgical facility: Note does not need to be faxed, will be available electronically in Epic.         Subjective       HPI related to upcoming procedure: 63 year old with very long history of hip problems now to have left GUSTAVO.  He had right GUSTAVO in May this year.         8/7/2023     9:38 AM   Preop Questions   1. Have you ever had a heart attack or stroke? No   2. Have you ever had surgery on your heart or blood vessels, such as a stent placement, a coronary artery bypass, or surgery on an artery in your head, neck, heart, or legs? No   3. Do you have chest pain with activity? No   4. Do you have a history of  heart failure? No   5. Do you currently have a cold, bronchitis or symptoms of other infection? No   6. Do you have a cough, shortness of breath, or wheezing? No   7. Do you or anyone in your family have previous history of blood clots? No   8. Do you or does anyone in your family have a serious bleeding problem such as prolonged bleeding following surgeries or cuts? No   9. Have you ever had problems with anemia or been told to take iron pills? No   10. Have you had any abnormal blood loss such as black, tarry or bloody stools? No   11. Have you ever had a blood transfusion? No   12. Are you willing to have a blood transfusion if it is medically needed before, during, or after your  surgery? Yes   13. Have you or any of your relatives ever had problems with anesthesia? No   14. Do you have sleep apnea, excessive snoring or daytime drowsiness? No   15. Do you have any artifical heart valves or other implanted medical devices like a pacemaker, defibrillator, or continuous glucose monitor? No   16. Do you have artificial joints? YES - left GUSTAVO this year    17. Are you allergic to latex? No       Health Care Directive:  Patient does not have a Health Care Directive or Living Will:     Preoperative Review of :  Patient not on any controlled substances           Review of Systems  Constitutional, neuro, ENT, endocrine, pulmonary, cardiac, gastrointestinal, genitourinary, musculoskeletal, integument and psychiatric systems are negative, except as otherwise noted.    No recent illnesses    No problems after prior hip procedure on right.  That hip feels fine, no pain.    No cardiac history.     No chest pain/ breathing problesm.    No bleeeding or clotting disorders.    No anesthesia problems    Patient Active Problem List    Diagnosis Date Noted    Primary osteoarthritis of right hip 06/05/2023     Priority: Medium    Pain in joint, ankle and foot, left 01/08/2018     Priority: Medium    CARDIOVASCULAR SCREENING; LDL GOAL LESS THAN 100 08/16/2017     Priority: Medium    Advanced directives, counseling/discussion 07/03/2015     Priority: Medium     Advance Care Planning 7/7/2015: ACP Review and Resources Provided:  Reviewed chart for advance care plan.  Case Epstein has no plan or code status on file. Discussed available resources and provided with information. Confirmed code status reflects current choices pending further ACP discussions.  Confirmed/documented legally designated decision maker(s).  Added by Noemi Sales            Vitamin D deficiency disease 06/26/2013     Priority: Medium    Primary osteoarthritis of right knee 09/27/2011     Priority: Medium      Past Medical History:  "  Diagnosis Date    High cholesterol     OA (OSTEOARTHRITIS) OF KNEE - right 09/27/2011     Past Surgical History:   Procedure Laterality Date    ARTHRODESIS ANKLE Left 11/20/2017    Procedure: ARTHRODESIS ANKLE;  LEFT ANKLE FUSION (C-ARM);  Surgeon: Shaggy Linn MD;  Location: [facility]    ARTHROPLASTY HIP ANTERIOR Right 5/30/2023    Procedure: Right ARTHROPLASTY HIP TOTAL DIRECT ANTERIOR APPROACH;  Surgeon: Dennis Randle MD;  Location: [place] [facility]    COLONOSCOPY      ORTHOPEDIC SURGERY Right     right TKA May 2021    ZZC AFF PALATE/UVULA SURGERY UNLISTED  age 2 and in 1986    2 procedures for palate/nasal issues     Current Outpatient Medications   Medication Sig Dispense Refill    acetaminophen (TYLENOL) 325 MG tablet Take 2 tablets (650 mg) by mouth every 4 hours as needed for other (mild pain) 100 tablet 0    amLODIPine (NORVASC) 5 MG tablet Take 1 tablet (5 mg) by mouth daily 90 tablet 3    aspirin (ASA) 325 MG EC tablet Take 1 tablet (325 mg) by mouth daily 30 tablet 0    ibuprofen (ADVIL/MOTRIN) 200 MG tablet Take 200 mg by mouth every 4 hours as needed for mild pain         No Known Allergies     Social History     Tobacco Use    Smoking status: Never    Smokeless tobacco: Never   Substance Use Topics    Alcohol use: No     Comment: occasional        History   Drug Use No         Objective     /72 (BP Location: Right arm, Patient Position: Chair, Cuff Size: Adult Regular)   Pulse 87   Temp 98  F (36.7  C) (Temporal)   Resp 16   Ht 1.753 m (5' 9\")   Wt 80.9 kg (178 lb 4 oz)   SpO2 96%   BMI 26.32 kg/m      Physical Exam    GENERAL APPEARANCE: healthy, alert and no distress     EYES: EOMI,  PERRL     HENT: ear canals and TM's normal and nose and mouth without ulcers or lesions.  He has chronic palate changes from past surgeries     NECK: no adenopathy, no asymmetry, masses, or scars and thyroid normal to palpation     RESP: lungs clear to auscultation - no rales, rhonchi or wheezes     CV: " regular rates and rhythm, normal S1 S2, no S3 or S4 and no murmur, click or rub     ABDOMEN:  soft, nontender, no HSM or masses and bowel sounds normal     MS: extremities normal- no gross deformities noted, no evidence of inflammation in joints, FROM in all extremities.     SKIN: no suspicious lesions or rashes     NEURO: Normal strength and tone, sensory exam grossly normal, mentation intact and speech normal     PSYCH: mentation appears normal. and affect normal/bright     LYMPHATICS: No cervical adenopathy    Recent Labs   Lab Test 05/31/23  0958 05/31/23  0511 04/24/23  0852   HGB  --  11.1*  11.1* 15.6   PLT  --  192 168     --  139   POTASSIUM 4.2  --  4.2   CR 0.92  --  0.82   A1C  --   --  5.3        Diagnostics:      No EKG needed given no cardiac history/ symptoms    Labs pending, cmp and cbc    ASSESSMENT / PLAN:  (Z01.818) Preop general physical exam  (primary encounter diagnosis)  Comment: patient in stable health overall.  He has no problems with right GUSTAVO in May.  Plan: Comprehensive metabolic panel, CBC with         Platelets & Differential             (M16.12) Primary osteoarthritis of left hip  Comment: as above   Plan: as above     Patient will hold the aspirin one week prior    Take amlodipine the day of surgery early in am       I reviewed the patient's medications, allergies, medical history, family history, and social history.    Tushar Granados MD      Revised Cardiac Risk Index (RCRI):  The patient has the following serious cardiovascular risks for perioperative complications:   - No serious cardiac risks = 0 points     RCRI Interpretation: 0 points: Class I (very low risk - 0.4% complication rate)         Signed Electronically by: Tushar Granados MD  Copy of this evaluation report is provided to requesting physician.

## 2023-08-21 ENCOUNTER — ANESTHESIA EVENT (OUTPATIENT)
Dept: SURGERY | Facility: CLINIC | Age: 63
End: 2023-08-21
Payer: COMMERCIAL

## 2023-08-21 NOTE — ANESTHESIA PREPROCEDURE EVALUATION
Anesthesia Pre-Procedure Evaluation    Patient: Case Epstein   MRN: [id number] : 1960        Procedure : Procedure(s):  ARTHROPLASTY HIP TOTAL DIRECT ANTERIOR APPROACH          Past Medical History:   Diagnosis Date    High cholesterol     OA (OSTEOARTHRITIS) OF KNEE - right 2011      Past Surgical History:   Procedure Laterality Date    ARTHRODESIS ANKLE Left 2017    Procedure: ARTHRODESIS ANKLE;  LEFT ANKLE FUSION (C-ARM);  Surgeon: Shaggy Linn MD;  Location: [facility]    ARTHROPLASTY HIP ANTERIOR Right 2023    Procedure: Right ARTHROPLASTY HIP TOTAL DIRECT ANTERIOR APPROACH;  Surgeon: Dennis Randle MD;  Location: [place] [facility]    COLONOSCOPY      ORTHOPEDIC SURGERY Right     right TKA May 2021    ZZC AFF PALATE/UVULA SURGERY UNLISTED  age 2 and in     2 procedures for palate/nasal issues      No Known Allergies   Social History     Tobacco Use    Smoking status: Never    Smokeless tobacco: Never   Substance Use Topics    Alcohol use: No     Comment: occasional      Wt Readings from Last 1 Encounters:   23 80.9 kg (178 lb 4 oz)        Anesthesia Evaluation   Pt has had prior anesthetic. Type: General, MAC and Regional.        ROS/MED HX  ENT/Pulmonary:  - neg pulmonary ROS     Neurologic:  - neg neurologic ROS     Cardiovascular:     (+) Dyslipidemia - -   -  - -                                 Previous cardiac testing   Echo: Date: Results:    Stress Test:  Date: Results:    ECG Reviewed:  Date: 2017 Results:  Sinus  Bradycardia   -RSR(V1) -nondiagnostic.     PROBABLY NORMAL  Cath:  Date: Results:      METS/Exercise Tolerance:     Hematologic:  - neg hematologic  ROS     Musculoskeletal:   (+)  arthritis,             GI/Hepatic:  - neg GI/hepatic ROS     Renal/Genitourinary:  - neg Renal ROS     Endo:  - neg endo ROS     Psychiatric/Substance Use:  - neg psychiatric ROS     Infectious Disease:  - neg infectious disease ROS     Malignancy:  - neg malignancy ROS     Other:   - neg other ROS          Physical Exam    Airway        Mallampati: II   TM distance: > 3 FB   Neck ROM: full   Mouth opening: > 3 cm    Respiratory Devices and Support         Dental       (+) Multiple visibly decayed, broken teeth      Cardiovascular   cardiovascular exam normal          Pulmonary   pulmonary exam normal                OUTSIDE LABS:  CBC:   Lab Results   Component Value Date    WBC 6.8 08/14/2023    WBC 14.7 (H) 05/31/2023    HGB 13.6 08/14/2023    HGB 11.1 (L) 05/31/2023    HGB 11.1 (L) 05/31/2023    HCT 43.4 08/14/2023    HCT 34.1 (L) 05/31/2023     08/14/2023     05/31/2023     BMP:   Lab Results   Component Value Date     08/14/2023     05/31/2023    POTASSIUM 4.7 08/14/2023    POTASSIUM 4.2 05/31/2023    CHLORIDE 105 08/14/2023    CHLORIDE 100 05/31/2023    CO2 27 08/14/2023    CO2 24 05/31/2023    BUN 15.9 08/14/2023    BUN 17.3 05/31/2023    CR 0.79 08/14/2023    CR 0.92 05/31/2023    GLC 99 08/14/2023     (H) 05/31/2023     COAGS: No results found for: PTT, INR, FIBR  POC: No results found for: BGM, HCG, HCGS  HEPATIC:   Lab Results   Component Value Date    ALBUMIN 4.5 08/14/2023    PROTTOTAL 6.8 08/14/2023    ALT 6 08/14/2023    AST 19 08/14/2023    ALKPHOS 83 08/14/2023    BILITOTAL 0.3 08/14/2023     OTHER:   Lab Results   Component Value Date    LACT 3.2 (H) 05/31/2023    A1C 5.3 04/24/2023    LALO 9.5 08/14/2023    TSH 3.11 04/24/2023       Anesthesia Plan    ASA Status:  2    NPO Status:  NPO Appropriate    Anesthesia Type: Spinal.   Induction: Propofol.   Maintenance: TIVA.        Consents    Anesthesia Plan(s) and associated risks, benefits, and realistic alternatives discussed. Questions answered and patient/representative(s) expressed understanding.     - Discussed: Risks, Benefits and Alternatives for BOTH SEDATION and the PROCEDURE were discussed     - Discussed with:  Patient      - Extended Intubation/Ventilatory Support Discussed: No.      -  Patient is DNR/DNI Status: No     Use of blood products discussed: No .     Postoperative Care    Pain management: IV analgesics, Oral pain medications, Multi-modal analgesia, Peripheral nerve block (Single Shot).   PONV prophylaxis: Ondansetron (or other 5HT-3), Dexamethasone or Solumedrol     Comments:                NATTY Townsend CRNA

## 2023-08-22 ENCOUNTER — APPOINTMENT (OUTPATIENT)
Dept: GENERAL RADIOLOGY | Facility: CLINIC | Age: 63
End: 2023-08-22
Attending: ORTHOPAEDIC SURGERY
Payer: COMMERCIAL

## 2023-08-22 ENCOUNTER — ANCILLARY PROCEDURE (OUTPATIENT)
Dept: ULTRASOUND IMAGING | Facility: CLINIC | Age: 63
End: 2023-08-22
Attending: NURSE ANESTHETIST, CERTIFIED REGISTERED
Payer: COMMERCIAL

## 2023-08-22 ENCOUNTER — ANESTHESIA (OUTPATIENT)
Dept: SURGERY | Facility: CLINIC | Age: 63
End: 2023-08-22
Payer: COMMERCIAL

## 2023-08-22 ENCOUNTER — APPOINTMENT (OUTPATIENT)
Dept: GENERAL RADIOLOGY | Facility: CLINIC | Age: 63
End: 2023-08-22
Attending: PHYSICIAN ASSISTANT
Payer: COMMERCIAL

## 2023-08-22 ENCOUNTER — HOSPITAL ENCOUNTER (OUTPATIENT)
Facility: CLINIC | Age: 63
Discharge: HOME | End: 2023-08-23
Attending: ORTHOPAEDIC SURGERY | Admitting: ORTHOPAEDIC SURGERY
Payer: COMMERCIAL

## 2023-08-22 DIAGNOSIS — Z96.642 S/P HIP REPLACEMENT, LEFT: Primary | ICD-10-CM

## 2023-08-22 PROBLEM — I10 PRIMARY HYPERTENSION: Status: ACTIVE | Noted: 2023-08-22

## 2023-08-22 PROCEDURE — 258N000003 HC RX IP 258 OP 636: Performed by: NURSE ANESTHETIST, CERTIFIED REGISTERED

## 2023-08-22 PROCEDURE — C1776 JOINT DEVICE (IMPLANTABLE): HCPCS | Performed by: ORTHOPAEDIC SURGERY

## 2023-08-22 PROCEDURE — 258N000003 HC RX IP 258 OP 636: Performed by: PHYSICIAN ASSISTANT

## 2023-08-22 PROCEDURE — C1713 ANCHOR/SCREW BN/BN,TIS/BN: HCPCS | Performed by: ORTHOPAEDIC SURGERY

## 2023-08-22 PROCEDURE — 250N000011 HC RX IP 250 OP 636: Mod: JZ | Performed by: NURSE ANESTHETIST, CERTIFIED REGISTERED

## 2023-08-22 PROCEDURE — 250N000009 HC RX 250: Performed by: ORTHOPAEDIC SURGERY

## 2023-08-22 PROCEDURE — 250N000009 HC RX 250: Performed by: NURSE ANESTHETIST, CERTIFIED REGISTERED

## 2023-08-22 PROCEDURE — 370N000017 HC ANESTHESIA TECHNICAL FEE, PER MIN: Performed by: ORTHOPAEDIC SURGERY

## 2023-08-22 PROCEDURE — 27130 TOTAL HIP ARTHROPLASTY: CPT | Mod: AS | Performed by: PHYSICIAN ASSISTANT

## 2023-08-22 PROCEDURE — 999N000180 XR SURGERY CARM FLUORO LESS THAN 5 MIN: Mod: TC

## 2023-08-22 PROCEDURE — 27130 TOTAL HIP ARTHROPLASTY: CPT | Mod: LT | Performed by: ORTHOPAEDIC SURGERY

## 2023-08-22 PROCEDURE — 250N000013 HC RX MED GY IP 250 OP 250 PS 637: Performed by: PHYSICIAN ASSISTANT

## 2023-08-22 PROCEDURE — 250N000011 HC RX IP 250 OP 636: Performed by: PHYSICIAN ASSISTANT

## 2023-08-22 PROCEDURE — 250N000013 HC RX MED GY IP 250 OP 250 PS 637: Performed by: NURSE ANESTHETIST, CERTIFIED REGISTERED

## 2023-08-22 PROCEDURE — 999N000141 HC STATISTIC PRE-PROCEDURE NURSING ASSESSMENT: Performed by: ORTHOPAEDIC SURGERY

## 2023-08-22 PROCEDURE — 710N000009 HC RECOVERY PHASE 1, LEVEL 1, PER MIN: Performed by: ORTHOPAEDIC SURGERY

## 2023-08-22 PROCEDURE — 272N000001 HC OR GENERAL SUPPLY STERILE: Performed by: ORTHOPAEDIC SURGERY

## 2023-08-22 PROCEDURE — 250N000011 HC RX IP 250 OP 636: Mod: JZ | Performed by: PHYSICIAN ASSISTANT

## 2023-08-22 PROCEDURE — 360N000084 HC SURGERY LEVEL 4 W/ FLUORO, PER MIN: Performed by: ORTHOPAEDIC SURGERY

## 2023-08-22 PROCEDURE — 271N000001 HC OR GENERAL SUPPLY NON-STERILE: Performed by: ORTHOPAEDIC SURGERY

## 2023-08-22 PROCEDURE — 250N000011 HC RX IP 250 OP 636: Performed by: ORTHOPAEDIC SURGERY

## 2023-08-22 PROCEDURE — 999N000065 XR PELVIS PORT 1/2 VIEWS

## 2023-08-22 DEVICE — IMP LINER HIP DEPUY PINNACLE ALTRX 36X56MM +4 1221-36-456: Type: IMPLANTABLE DEVICE | Site: HIP | Status: FUNCTIONAL

## 2023-08-22 DEVICE — IMP STEM FEM DEPUY ACTIS STD COLLAR TPR SZ 8MM 1010-11-080: Type: IMPLANTABLE DEVICE | Site: HIP | Status: FUNCTIONAL

## 2023-08-22 DEVICE — IMPLANTABLE DEVICE: Type: IMPLANTABLE DEVICE | Site: HIP | Status: FUNCTIONAL

## 2023-08-22 DEVICE — IMP APEX HOLE ELIMINATOR HIP DEPUY DURALOC 1246-03-000: Type: IMPLANTABLE DEVICE | Site: HIP | Status: FUNCTIONAL

## 2023-08-22 RX ORDER — HYDROMORPHONE HCL IN WATER/PF 6 MG/30 ML
0.2 PATIENT CONTROLLED ANALGESIA SYRINGE INTRAVENOUS
Status: DISCONTINUED | OUTPATIENT
Start: 2023-08-22 | End: 2023-08-23 | Stop reason: HOSPADM

## 2023-08-22 RX ORDER — AMOXICILLIN 250 MG
1 CAPSULE ORAL 2 TIMES DAILY
Status: DISCONTINUED | OUTPATIENT
Start: 2023-08-22 | End: 2023-08-23 | Stop reason: HOSPADM

## 2023-08-22 RX ORDER — AMLODIPINE BESYLATE 5 MG/1
5 TABLET ORAL DAILY
Status: DISCONTINUED | OUTPATIENT
Start: 2023-08-23 | End: 2023-08-23 | Stop reason: HOSPADM

## 2023-08-22 RX ORDER — HYDROXYZINE HYDROCHLORIDE 25 MG/1
25 TABLET, FILM COATED ORAL EVERY 6 HOURS PRN
Status: DISCONTINUED | OUTPATIENT
Start: 2023-08-22 | End: 2023-08-23 | Stop reason: HOSPADM

## 2023-08-22 RX ORDER — FENTANYL CITRATE 50 UG/ML
INJECTION, SOLUTION INTRAMUSCULAR; INTRAVENOUS PRN
Status: DISCONTINUED | OUTPATIENT
Start: 2023-08-22 | End: 2023-08-22

## 2023-08-22 RX ORDER — PROPOFOL 10 MG/ML
INJECTION, EMULSION INTRAVENOUS CONTINUOUS PRN
Status: DISCONTINUED | OUTPATIENT
Start: 2023-08-22 | End: 2023-08-22

## 2023-08-22 RX ORDER — CEFAZOLIN SODIUM/WATER 2 G/20 ML
2 SYRINGE (ML) INTRAVENOUS
Status: COMPLETED | OUTPATIENT
Start: 2023-08-22 | End: 2023-08-22

## 2023-08-22 RX ORDER — ROPIVACAINE HYDROCHLORIDE 5 MG/ML
INJECTION, SOLUTION EPIDURAL; INFILTRATION; PERINEURAL PRN
Status: DISCONTINUED | OUTPATIENT
Start: 2023-08-22 | End: 2023-08-22

## 2023-08-22 RX ORDER — BISACODYL 10 MG
10 SUPPOSITORY, RECTAL RECTAL DAILY PRN
Status: DISCONTINUED | OUTPATIENT
Start: 2023-08-22 | End: 2023-08-23 | Stop reason: HOSPADM

## 2023-08-22 RX ORDER — LIDOCAINE HCL/EPINEPHRINE/PF 2%-1:200K
VIAL (ML) INJECTION PRN
Status: DISCONTINUED | OUTPATIENT
Start: 2023-08-22 | End: 2023-08-22

## 2023-08-22 RX ORDER — FENTANYL CITRATE 50 UG/ML
50 INJECTION, SOLUTION INTRAMUSCULAR; INTRAVENOUS EVERY 5 MIN PRN
Status: DISCONTINUED | OUTPATIENT
Start: 2023-08-22 | End: 2023-08-22 | Stop reason: HOSPADM

## 2023-08-22 RX ORDER — GLYCOPYRROLATE 0.2 MG/ML
INJECTION, SOLUTION INTRAMUSCULAR; INTRAVENOUS PRN
Status: DISCONTINUED | OUTPATIENT
Start: 2023-08-22 | End: 2023-08-22

## 2023-08-22 RX ORDER — HYDROMORPHONE HCL IN WATER/PF 6 MG/30 ML
0.4 PATIENT CONTROLLED ANALGESIA SYRINGE INTRAVENOUS EVERY 5 MIN PRN
Status: DISCONTINUED | OUTPATIENT
Start: 2023-08-22 | End: 2023-08-22 | Stop reason: HOSPADM

## 2023-08-22 RX ORDER — ONDANSETRON 2 MG/ML
4 INJECTION INTRAMUSCULAR; INTRAVENOUS EVERY 30 MIN PRN
Status: DISCONTINUED | OUTPATIENT
Start: 2023-08-22 | End: 2023-08-22 | Stop reason: HOSPADM

## 2023-08-22 RX ORDER — NALOXONE HYDROCHLORIDE 0.4 MG/ML
0.4 INJECTION, SOLUTION INTRAMUSCULAR; INTRAVENOUS; SUBCUTANEOUS
Status: DISCONTINUED | OUTPATIENT
Start: 2023-08-22 | End: 2023-08-23 | Stop reason: HOSPADM

## 2023-08-22 RX ORDER — HYDROMORPHONE HCL IN WATER/PF 6 MG/30 ML
0.2 PATIENT CONTROLLED ANALGESIA SYRINGE INTRAVENOUS EVERY 5 MIN PRN
Status: DISCONTINUED | OUTPATIENT
Start: 2023-08-22 | End: 2023-08-22 | Stop reason: HOSPADM

## 2023-08-22 RX ORDER — NALOXONE HYDROCHLORIDE 0.4 MG/ML
0.2 INJECTION, SOLUTION INTRAMUSCULAR; INTRAVENOUS; SUBCUTANEOUS
Status: DISCONTINUED | OUTPATIENT
Start: 2023-08-22 | End: 2023-08-23 | Stop reason: HOSPADM

## 2023-08-22 RX ORDER — GABAPENTIN 300 MG/1
300 CAPSULE ORAL
Status: COMPLETED | OUTPATIENT
Start: 2023-08-22 | End: 2023-08-22

## 2023-08-22 RX ORDER — ONDANSETRON 4 MG/1
4 TABLET, ORALLY DISINTEGRATING ORAL EVERY 30 MIN PRN
Status: DISCONTINUED | OUTPATIENT
Start: 2023-08-22 | End: 2023-08-22 | Stop reason: HOSPADM

## 2023-08-22 RX ORDER — LIDOCAINE 40 MG/G
CREAM TOPICAL
Status: DISCONTINUED | OUTPATIENT
Start: 2023-08-22 | End: 2023-08-22 | Stop reason: HOSPADM

## 2023-08-22 RX ORDER — HYDROMORPHONE HCL IN WATER/PF 6 MG/30 ML
0.4 PATIENT CONTROLLED ANALGESIA SYRINGE INTRAVENOUS
Status: DISCONTINUED | OUTPATIENT
Start: 2023-08-22 | End: 2023-08-23 | Stop reason: HOSPADM

## 2023-08-22 RX ORDER — LIDOCAINE 40 MG/G
CREAM TOPICAL
Status: DISCONTINUED | OUTPATIENT
Start: 2023-08-22 | End: 2023-08-23 | Stop reason: HOSPADM

## 2023-08-22 RX ORDER — CEFAZOLIN SODIUM 2 G/100ML
2 INJECTION, SOLUTION INTRAVENOUS EVERY 8 HOURS
Status: COMPLETED | OUTPATIENT
Start: 2023-08-22 | End: 2023-08-23

## 2023-08-22 RX ORDER — FENTANYL CITRATE 50 UG/ML
25 INJECTION, SOLUTION INTRAMUSCULAR; INTRAVENOUS EVERY 5 MIN PRN
Status: DISCONTINUED | OUTPATIENT
Start: 2023-08-22 | End: 2023-08-22 | Stop reason: HOSPADM

## 2023-08-22 RX ORDER — POLYETHYLENE GLYCOL 3350 17 G/17G
17 POWDER, FOR SOLUTION ORAL DAILY
Status: DISCONTINUED | OUTPATIENT
Start: 2023-08-23 | End: 2023-08-23 | Stop reason: HOSPADM

## 2023-08-22 RX ORDER — OXYCODONE HYDROCHLORIDE 5 MG/1
5 TABLET ORAL EVERY 4 HOURS PRN
Status: DISCONTINUED | OUTPATIENT
Start: 2023-08-22 | End: 2023-08-23 | Stop reason: HOSPADM

## 2023-08-22 RX ORDER — OXYCODONE HYDROCHLORIDE 5 MG/1
10 TABLET ORAL EVERY 4 HOURS PRN
Status: DISCONTINUED | OUTPATIENT
Start: 2023-08-22 | End: 2023-08-23 | Stop reason: HOSPADM

## 2023-08-22 RX ORDER — DEXAMETHASONE SODIUM PHOSPHATE 4 MG/ML
INJECTION, SOLUTION INTRA-ARTICULAR; INTRALESIONAL; INTRAMUSCULAR; INTRAVENOUS; SOFT TISSUE PRN
Status: DISCONTINUED | OUTPATIENT
Start: 2023-08-22 | End: 2023-08-22

## 2023-08-22 RX ORDER — SODIUM CHLORIDE, SODIUM LACTATE, POTASSIUM CHLORIDE, CALCIUM CHLORIDE 600; 310; 30; 20 MG/100ML; MG/100ML; MG/100ML; MG/100ML
INJECTION, SOLUTION INTRAVENOUS CONTINUOUS
Status: DISCONTINUED | OUTPATIENT
Start: 2023-08-22 | End: 2023-08-23 | Stop reason: HOSPADM

## 2023-08-22 RX ORDER — VANCOMYCIN HYDROCHLORIDE 1 G/20ML
INJECTION, POWDER, LYOPHILIZED, FOR SOLUTION INTRAVENOUS PRN
Status: DISCONTINUED | OUTPATIENT
Start: 2023-08-22 | End: 2023-08-22 | Stop reason: HOSPADM

## 2023-08-22 RX ORDER — SODIUM CHLORIDE, SODIUM LACTATE, POTASSIUM CHLORIDE, CALCIUM CHLORIDE 600; 310; 30; 20 MG/100ML; MG/100ML; MG/100ML; MG/100ML
INJECTION, SOLUTION INTRAVENOUS CONTINUOUS
Status: DISCONTINUED | OUTPATIENT
Start: 2023-08-22 | End: 2023-08-22 | Stop reason: HOSPADM

## 2023-08-22 RX ORDER — ASPIRIN 325 MG
325 TABLET, DELAYED RELEASE (ENTERIC COATED) ORAL DAILY
Status: DISCONTINUED | OUTPATIENT
Start: 2023-08-22 | End: 2023-08-23 | Stop reason: HOSPADM

## 2023-08-22 RX ORDER — TRANEXAMIC ACID 650 MG/1
1950 TABLET ORAL ONCE
Status: COMPLETED | OUTPATIENT
Start: 2023-08-22 | End: 2023-08-22

## 2023-08-22 RX ORDER — ACETAMINOPHEN 325 MG/1
975 TABLET ORAL ONCE
Status: COMPLETED | OUTPATIENT
Start: 2023-08-22 | End: 2023-08-22

## 2023-08-22 RX ORDER — ACETAMINOPHEN 325 MG/1
975 TABLET ORAL EVERY 8 HOURS
Status: DISCONTINUED | OUTPATIENT
Start: 2023-08-22 | End: 2023-08-23 | Stop reason: HOSPADM

## 2023-08-22 RX ORDER — ONDANSETRON 4 MG/1
4 TABLET, ORALLY DISINTEGRATING ORAL EVERY 6 HOURS PRN
Status: DISCONTINUED | OUTPATIENT
Start: 2023-08-22 | End: 2023-08-23 | Stop reason: HOSPADM

## 2023-08-22 RX ORDER — KETAMINE HYDROCHLORIDE 10 MG/ML
INJECTION INTRAMUSCULAR; INTRAVENOUS PRN
Status: DISCONTINUED | OUTPATIENT
Start: 2023-08-22 | End: 2023-08-22

## 2023-08-22 RX ORDER — MAGNESIUM SULFATE HEPTAHYDRATE 40 MG/ML
INJECTION, SOLUTION INTRAVENOUS PRN
Status: DISCONTINUED | OUTPATIENT
Start: 2023-08-22 | End: 2023-08-22

## 2023-08-22 RX ORDER — ONDANSETRON 2 MG/ML
4 INJECTION INTRAMUSCULAR; INTRAVENOUS EVERY 6 HOURS PRN
Status: DISCONTINUED | OUTPATIENT
Start: 2023-08-22 | End: 2023-08-23 | Stop reason: HOSPADM

## 2023-08-22 RX ORDER — ACETAMINOPHEN 325 MG/1
650 TABLET ORAL EVERY 4 HOURS PRN
Status: DISCONTINUED | OUTPATIENT
Start: 2023-08-25 | End: 2023-08-23 | Stop reason: HOSPADM

## 2023-08-22 RX ORDER — EPINEPHRINE 1 MG/ML
INJECTION, SOLUTION, CONCENTRATE INTRAVENOUS
Status: COMPLETED | OUTPATIENT
Start: 2023-08-22 | End: 2023-08-22

## 2023-08-22 RX ORDER — PROCHLORPERAZINE MALEATE 5 MG
10 TABLET ORAL EVERY 6 HOURS PRN
Status: DISCONTINUED | OUTPATIENT
Start: 2023-08-22 | End: 2023-08-23 | Stop reason: HOSPADM

## 2023-08-22 RX ORDER — CEFAZOLIN SODIUM/WATER 2 G/20 ML
2 SYRINGE (ML) INTRAVENOUS SEE ADMIN INSTRUCTIONS
Status: DISCONTINUED | OUTPATIENT
Start: 2023-08-22 | End: 2023-08-22 | Stop reason: HOSPADM

## 2023-08-22 RX ORDER — BUPIVACAINE HYDROCHLORIDE 7.5 MG/ML
INJECTION, SOLUTION INTRASPINAL
Status: COMPLETED | OUTPATIENT
Start: 2023-08-22 | End: 2023-08-22

## 2023-08-22 RX ORDER — ONDANSETRON 2 MG/ML
INJECTION INTRAMUSCULAR; INTRAVENOUS PRN
Status: DISCONTINUED | OUTPATIENT
Start: 2023-08-22 | End: 2023-08-22

## 2023-08-22 RX ADMIN — FENTANYL CITRATE 100 MCG: 50 INJECTION, SOLUTION INTRAMUSCULAR; INTRAVENOUS at 09:26

## 2023-08-22 RX ADMIN — TRANEXAMIC ACID 1950 MG: 650 TABLET ORAL at 08:58

## 2023-08-22 RX ADMIN — Medication 2 G: at 10:07

## 2023-08-22 RX ADMIN — SENNOSIDES AND DOCUSATE SODIUM 1 TABLET: 50; 8.6 TABLET ORAL at 22:11

## 2023-08-22 RX ADMIN — SODIUM CHLORIDE, POTASSIUM CHLORIDE, SODIUM LACTATE AND CALCIUM CHLORIDE: 600; 310; 30; 20 INJECTION, SOLUTION INTRAVENOUS at 09:10

## 2023-08-22 RX ADMIN — LIDOCAINE HYDROCHLORIDE 0.2 ML: 10 INJECTION, SOLUTION EPIDURAL; INFILTRATION; INTRACAUDAL; PERINEURAL at 09:11

## 2023-08-22 RX ADMIN — DEXAMETHASONE SODIUM PHOSPHATE 4 MG: 4 INJECTION, SOLUTION INTRA-ARTICULAR; INTRALESIONAL; INTRAMUSCULAR; INTRAVENOUS; SOFT TISSUE at 09:33

## 2023-08-22 RX ADMIN — ACETAMINOPHEN 975 MG: 325 TABLET, FILM COATED ORAL at 08:58

## 2023-08-22 RX ADMIN — KETAMINE HYDROCHLORIDE 30 MG: 10 INJECTION INTRAMUSCULAR; INTRAVENOUS at 10:23

## 2023-08-22 RX ADMIN — ROPIVACAINE HYDROCHLORIDE 20 ML: 5 INJECTION, SOLUTION EPIDURAL; INFILTRATION; PERINEURAL at 09:33

## 2023-08-22 RX ADMIN — EPINEPHRINE 0.1 MG: 1 INJECTION, SOLUTION, CONCENTRATE INTRAVENOUS at 10:15

## 2023-08-22 RX ADMIN — LIDOCAINE HYDROCHLORIDE,EPINEPHRINE BITARTRATE 5 ML: 20; .005 INJECTION, SOLUTION EPIDURAL; INFILTRATION; INTRACAUDAL; PERINEURAL at 09:30

## 2023-08-22 RX ADMIN — GLYCOPYRROLATE 0.2 MG: 0.2 INJECTION, SOLUTION INTRAMUSCULAR; INTRAVENOUS at 10:44

## 2023-08-22 RX ADMIN — GABAPENTIN 300 MG: 300 CAPSULE ORAL at 08:58

## 2023-08-22 RX ADMIN — ONDANSETRON 4 MG: 2 INJECTION INTRAMUSCULAR; INTRAVENOUS at 10:20

## 2023-08-22 RX ADMIN — MAGNESIUM SULFATE HEPTAHYDRATE 2 G: 40 INJECTION, SOLUTION INTRAVENOUS at 10:35

## 2023-08-22 RX ADMIN — OXYCODONE HYDROCHLORIDE 5 MG: 5 TABLET ORAL at 22:17

## 2023-08-22 RX ADMIN — SODIUM CHLORIDE, POTASSIUM CHLORIDE, SODIUM LACTATE AND CALCIUM CHLORIDE: 600; 310; 30; 20 INJECTION, SOLUTION INTRAVENOUS at 17:52

## 2023-08-22 RX ADMIN — MIDAZOLAM 2 MG: 1 INJECTION INTRAMUSCULAR; INTRAVENOUS at 09:26

## 2023-08-22 RX ADMIN — SODIUM CHLORIDE, POTASSIUM CHLORIDE, SODIUM LACTATE AND CALCIUM CHLORIDE: 600; 310; 30; 20 INJECTION, SOLUTION INTRAVENOUS at 10:33

## 2023-08-22 RX ADMIN — PHENYLEPHRINE HYDROCHLORIDE 0.05 MCG/KG/MIN: 10 INJECTION INTRAVENOUS at 11:51

## 2023-08-22 RX ADMIN — ROPIVACAINE HYDROCHLORIDE 10 ML: 5 INJECTION, SOLUTION EPIDURAL; INFILTRATION; PERINEURAL at 09:37

## 2023-08-22 RX ADMIN — ACETAMINOPHEN 975 MG: 325 TABLET, FILM COATED ORAL at 17:52

## 2023-08-22 RX ADMIN — ASPIRIN 325 MG: 325 TABLET, COATED ORAL at 17:52

## 2023-08-22 RX ADMIN — BUPIVACAINE HYDROCHLORIDE IN DEXTROSE 1.8 ML: 7.5 INJECTION, SOLUTION SUBARACHNOID at 10:15

## 2023-08-22 RX ADMIN — MIDAZOLAM 2 MG: 1 INJECTION INTRAMUSCULAR; INTRAVENOUS at 10:07

## 2023-08-22 RX ADMIN — SODIUM CHLORIDE, POTASSIUM CHLORIDE, SODIUM LACTATE AND CALCIUM CHLORIDE: 600; 310; 30; 20 INJECTION, SOLUTION INTRAVENOUS at 16:34

## 2023-08-22 RX ADMIN — HYDROXYZINE HYDROCHLORIDE 25 MG: 25 TABLET, FILM COATED ORAL at 15:05

## 2023-08-22 RX ADMIN — HYDROXYZINE HYDROCHLORIDE 25 MG: 25 TABLET, FILM COATED ORAL at 22:17

## 2023-08-22 RX ADMIN — OXYCODONE HYDROCHLORIDE 5 MG: 5 TABLET ORAL at 15:05

## 2023-08-22 RX ADMIN — DEXAMETHASONE SODIUM PHOSPHATE 10 MG: 4 INJECTION, SOLUTION INTRA-ARTICULAR; INTRALESIONAL; INTRAMUSCULAR; INTRAVENOUS; SOFT TISSUE at 10:20

## 2023-08-22 RX ADMIN — CEFAZOLIN SODIUM 2 G: 2 INJECTION, SOLUTION INTRAVENOUS at 17:59

## 2023-08-22 RX ADMIN — PROPOFOL 50 MCG/KG/MIN: 10 INJECTION, EMULSION INTRAVENOUS at 10:20

## 2023-08-22 ASSESSMENT — ACTIVITIES OF DAILY LIVING (ADL)
DIFFICULTY_COMMUNICATING: NO
VISION_MANAGEMENT: GLASSES
HEARING_DIFFICULTY_OR_DEAF: NO
ADLS_ACUITY_SCORE: 20
WALKING_OR_CLIMBING_STAIRS_DIFFICULTY: NO
DRESSING/BATHING_DIFFICULTY: NO
ADLS_ACUITY_SCORE: 20
DOING_ERRANDS_INDEPENDENTLY_DIFFICULTY: NO
CONCENTRATING,_REMEMBERING_OR_MAKING_DECISIONS_DIFFICULTY: NO
ADLS_ACUITY_SCORE: 20
ADLS_ACUITY_SCORE: 20
FALL_HISTORY_WITHIN_LAST_SIX_MONTHS: NO
CHANGE_IN_FUNCTIONAL_STATUS_SINCE_ONSET_OF_CURRENT_ILLNESS/INJURY: NO
WEAR_GLASSES_OR_BLIND: YES
TOILETING_ISSUES: NO
ADLS_ACUITY_SCORE: 20
DIFFICULTY_EATING/SWALLOWING: NO
ADLS_ACUITY_SCORE: 23
ADLS_ACUITY_SCORE: 23
ADLS_ACUITY_SCORE: 20

## 2023-08-22 NOTE — OP NOTE
DATE OF SERVICE:  8/22/2023      PREOPERATIVE DIAGNOSIS:  Primary Osteoarthritis  - left Hip    POSTOPERATIVE DIAGNOSIS:  Primary Osteoarthritis  - left Hip    OPERATION PERFORMED:  Total Hip Arthroplasty, Press Fit, left Hip, Direct anterior Approach.    ATTENDING SURGEON(S):  Dennis Randle M.D., M.S.    ASSISTANT(S): Tano Mancia PA-C   The PA's assistance was medically necessary given the technical complexity of the case; with assistance with patient positioning, retraction and hip joint exposure, limb manipulation with initial dislocation of the native hip, femoral osteotomy, assistance with implant placement, trial and final arthroplasty hip implant reduction, and wound closure.      ANESTHESIA: Spinal with MAC; in the supine position on the [place] surgical table.    ESTIMATED BLOOD LOSS:  200mL .    FLUIDS:  1200 mL LR    UO: no calero.    ANTIBIOTICS:  cefazolin, 2gm IVPB prior to incision and repeat 2gm iv at closure.    Tranexamic Acid: 1950mg oral prior to procedure    IMPLANTS / GRAFTS:    DePuy 56mm [place] Gription acetabular shell with a +4 neutral [place] Altrx polyethylene liner  Size 8 DePuy Actis femoral stem, std offset  +12, 36 mm Biolox delta ceramic femoral head.  [place] hole eliminator      DRAINS:  None.    COMPLICATIONS:  None.     SPECIMENS: none      FINDINGS: advanced hip osteoarthritis, eburnated femoral head with marginal osteophytes. Acetabulum with impinging marginal osteophytes. Coxa valga.    INDICATIONS FOR PROCEDURE: Case Epstein is a pleasant 63 year old male who has had ongoing significant pain in the left hip. Xrays with advanced hip degenerative arthrosis with loss of joint space and marginal osteophytes. The risk/benefit analysis of the above left total hip arthroplasty was explained. Alternate bearing technology and the additional risks of a press fit hip were explained, including fracture. Risks discussed to include, but not be limited to: wear, loosening, infection,  bleeding, pain, scar, thromboembolic disease, neurovascular damage with temporary or permanent nerve damage, limb length inequality, implant failure, implant dislocation, tiffanie-prosthetic fracture, need for further surgery, risks of anesthesia and death.  Despite these risks, the patient elected to proceed and, with the patient's permission, was taken to the operating room.       DESCRIPTION OF PROCEDURE:  The patient was identified in the preoperative holding area.  After confirming with the patient the correct procedure and procedure site, the left hip was marked with an indelible marker by myself, the attending surgeon.  After again reviewing the risks and perceived benefits of surgery, questions were addressed, he elected to proceed and written informed consent was obtained and reviewed.     The patient was then taken to the operating room and placed supine on the operating surgical [place] table.  After adequate anesthesia, a Monk catheter was placed.  The lower extremities were placed in the boots and suspended.  The arms were well positioned and padded to be comfortable.  The left lower extremity was then prepped and draped in the usual sterile fashion.     A timeout was then performed confirming the correct patient, procedure, procedure site, availability of instruments and implants, the administration of prophylactic antibiotics as well as a review of the patient's allergies by all surgical staff.    At that time that an anterior based incision was made approximately two fingerbreadths lateral (3cm) and two fingerbreadths distal to the ASIS in an oblique fashion extending posterior-distally towards the anterior aspect of the femur approximately 10-12 cm in length, sharply through skin.  We then used electrocautery through the subcutaneous tissues, performing hemostasis.  Perforating vessels were cauterized. The fascia over the tensor was then incised longitudinally.  I then proceeded to dissect the tensor  "muscle off the  undersurface of the fascia anteriorly and medially, exposing the underlying tissues and capsule as well as the rectus femoris medially.  A retractor was placed laterally over the neck as well as inferiorly over the femoral neck and anteriorly over the acetabulum rim deep to the rectus.  We then proceeded to use a Travon retractor laterally and a vascular bundle of the circumflex vessels was identified.  Using the Aquamantys, these vessels were then cauterized.  Good exposure of the anterior aspect  of the femoral neck capsule was obtained.  I then proceeded to make a capsulotomy an inverted \"T\" fashion, starting at the superolateral aspect of the acetabulum and femoral head, along the superior aspect of the femoral neck down to the intertrochanteric ridge and then distally in an oblique fashion along the intertrochanteric ridge towards the lesser trochanter.  This anterior capsule was then tagged. I also tagged the superolateral capsule at the saddle of the trochanter and gently released this as well.  This exposed the underlying femoral head and neck. There were prominent marginal osteophytes. The retractors were then placed intracapsular.  Further  capsular releases were performed inferiorly along the medial calcar to get to the base of the lesser trochanter as well as laterally into the trochanteric saddle.     I then used a C-arm to get positioning with making the femoral neck cut.  After determining adequate cut to the templated length, a first cut was made at the intended final cut.  Care was taken to avoid the posterior hanging greater trochanter.  I then proceeded to make another cut medially at the base of the femoral head, to make a \"napkin\" ring, to aid in removal of the femoral head.  Using a corkscrew into the femoral head, using a twisting motion,  the femoral head was then removed in line with the fibers of the tensor muscle with care taken to protect the tensor.  The wound was then " irrigated.  I then proceeded to place a retractor anteriorly over the anterior/inferior acetabulum, inferiorly over the transverse acetabular ligament as well as posteriorly within the capsule, giving good exposure to the acetabulum.  I then proceeded using either a long-handled knife or electrocautery to remove labral tissue as well as the  pulvinar at the acetabular floor. Overhanging osteophytes were removed.  There was significant medial acetabular osteophyte.  Once the soft tissue was cleared out, I then proceeded to ream.  Using a 51 mm reamer and using a C-arm, the acetabulum was medialized to the teardrop, removing the medial osteophyte.  I then sequentially increased reamer sizes by 2 mm, up to a 55, maintaining correct abduction and version, which was confirmed with the C-arm.  We trialed a size 55 cup.  This  was a very good and tight fit.  I then proceeded to place the final acetabular shell using the C-arm to guide the abduction angle as well as anteversion, which was approximately 22 degrees.  Once this was confirmed, the final acetabular polyethylene liner was placed.  The wound was irrigated.     I then proceeded to the femur.  A hook retractor was placed along the lateral aspect of the trochanter just distal to the vastus tubercle. Using my hand, I pulled the femur laterally taking care that it was not caught under the acetabulum. We then proceeded to lower the limb down to the floor and slightly adduct, externally rotating the foot to 120 degrees (femur about 90 degrees), giving good femoral exposure.  A retractor was placed medially along the calcar.  I then proceeded to release the lateral capsule along the inner aspect of the greater trochanter, taking care of  the underlying rotators; these tendons were exposed and kept intact.  This gave better exposure of the femur. A shepards hook was placed over the tip of the greater trochanter.  I then proceeded to connect the femoral hook to the lift and  using my hand to lift the femur, proceeded to elevate the lift until there was firm pressure against my hand.  This gave us adequate exposure of the femur for broaching.  A cookie cutter was placed laterally to remove some of the remnant of the lateral neck as well as a  rongeur.  A canal finding awl was placed down the femur.  I then proceeded to broach, first starting with the chili pepper broach followed by sequential broaching up to the templated size.  This provided a good fit.  This was calcar planed.  The wound was irrigated.  I then proceeded to do a trial reduction,  Starting off with a +1.5 femoral head, releasing the femoral hook and removing the retractors and bringing the leg back up to a neutral position,  the hip was easily reduced.  Using C-arm, we assess length using the overlay technique with printed images.  Once the final length was determined trialing varius head sizes using the overlay technique, we proceed to dislocate the trial and proceed with final femoral stem placement. The final femoral stem was then placed.  This provided good fixation on the femoral component.  I then proceeded to trial once again the previous trialed femoral head and this was reduced, with final images showing good length. It appeared to be a good fit and stable.  This trial was then dislocated and the  final ceramic femoral head was placed.  The wound was copiously irrigated.  The hip was then reduced.  It was taken through a range of motion including flexion, extension, internal and external rotation and there was good stability without dislocation concerns.  The wound was then copiously irrigated starting with a liter of dilute Betadine followed by three liters of antibiotic normal saline.  A second dose of Ancef was administered.  The  capsule was repaired side-to-side with 0 Ethibond.  One gram of vancomycin powder was placed deep in the wound.  The fascia over the tensor was closed side-to-side with #1 Vicryl  followed by a 0 Stratafix.  The subcutaneous and dermal tissues were then approximated with 0 Vicryl and 3-0 Monocryl.  3-0 Monocryl subcuticular suture was used to maintain good skin eversion and apposition with Dermabond to seal the skin edges.  A sterile waterproof  dressing was applied over the incision.  The patient was then awakened and extubated and taken to the recovery room without difficulty in stable condition.  There were no apparent complications.       A postoperative pelvis x-ray will be obtained in recovery.      The postoperative plan will be to weight bear as tolerated.  No hip precautions.  Twenty-three hours of IV antibiotics.  Anticoagulation will proceed for four weeks postoperative and will include daily 325 mg aspirin.     There were no apparent complications.        Dennis Randle M.D., M.S.  Dept. of Orthopaedic Surgery  [facility]

## 2023-08-22 NOTE — PROGRESS NOTES
Writer agrees with primary RN's skin assessment findings.    Josephine Washington RN on 8/22/2023 at 6:37 PM

## 2023-08-22 NOTE — ANESTHESIA PROCEDURE NOTES
"Intrathecal injection Procedure Note    Pre-Procedure   Staff -        CRNA: Carmelina Perez APRN CRNA       Performed By: CRNA       Location: OR       Procedure Start/Stop Times: 8/22/2023 10:10 AM and 8/22/2023 10:16 AM       Pre-Anesthestic Checklist: patient identified, IV checked, risks and benefits discussed, informed consent, monitors and equipment checked, pre-op evaluation, at physician/surgeon's request and post-op pain management  Timeout:       Correct Patient: Yes        Correct Procedure: Yes        Correct Site: Yes        Correct Position: Yes   Procedure Documentation  Procedure: intrathecal injection       Patient Position: sitting       Patient Prep/Sterile Barriers: sterile gloves, mask, patient draped       Skin prep: Betadine       Insertion Site: L3-4. (midline approach).       Needle Gauge: 25.        Needle Length (Inches): 3.5        Spinal Needle Type: Pencan       Introducer used       # of attempts: 1 and  # of redirects:     Assessment/Narrative         Paresthesias: No.       Sensory Level: T8       CSF fluid: clear.    Medication(s) Administered   0.75% Hyperbaric Bupivacaine (Intrathecal) - Intrathecal   1.8 mL - 8/22/2023 10:15:00 AM  Epinephrine 1000 mcg/mL (Intrathecal) - Intrathecal   0.1 mg - 8/22/2023 10:15:00 AM  Medication Administration Time: 8/22/2023 10:10 AM      FOR [facility] ([facility] [place]) ONLY:   Pain Team Contact information: please page the Pain Team Via [employer]. Search \"Pain\". During daytime hours, please page the attending first. At night please page the resident first.      "

## 2023-08-22 NOTE — ANESTHESIA POSTPROCEDURE EVALUATION
Patient: Case Epstein    Procedure: Procedure(s):  ARTHROPLASTY, HIP, TOTAL, DIRECT ANTERIOR APPROACH       Anesthesia Type:  Spinal    Note:  Disposition: Inpatient   Postop Pain Control: Uneventful            Sign Out: Well controlled pain   PONV: No   Neuro/Psych: Uneventful            Sign Out: Acceptable/Baseline neuro status   Airway/Respiratory: Uneventful            Sign Out: Acceptable/Baseline resp. status   CV/Hemodynamics: Uneventful            Sign Out: Acceptable CV status; No obvious hypovolemia; No obvious fluid overload   Other NRE: NONE   DID A NON-ROUTINE EVENT OCCUR? No           Last vitals:  Vitals Value Taken Time   /77 08/22/23 1345   Temp 36.6  C (97.9  F) 08/22/23 1330   Pulse 73 08/22/23 1348   Resp 12 08/22/23 1348   SpO2 94 % 08/22/23 1348   Vitals shown include unvalidated device data.    Electronically Signed By: NATTY Townsend CRNA  August 22, 2023  1:48 PM

## 2023-08-22 NOTE — PLAN OF CARE
"[place] [facility] [place]    Patient admitted to room 2300 at approximately 1410 via cart from surgery. Patient was accompanied by significant other.     Verbal SBAR report received from ИРИНА Robles  prior to patient arrival.     Patient trasferred to bed via air devendra. Patient alert and oriented X 3. The patient is not having any pain.  . Admission vital signs: Blood pressure 102/70, pulse 69, temperature 97.7  F (36.5  C), temperature source Oral, resp. rate 18, height 1.753 m (5' 9\"), weight 80.8 kg (178 lb 2.1 oz), SpO2 (!) 88 %. Patient and significant other was oriented to plan of care, call light, bed controls, tv, telephone, bathroom, and visiting hours.     Risk Assessment    The following safety risks were identified during admission: fall. Yellow risk band applied: YES.     Skin Initial Assessment    This writer admitted this patient and completed a full skin assessment and Jae score in the Adult PCS flowsheet. Appropriate interventions initiated as needed.     Secondary skin check completed by ИРИНА Burton .         Education    Patient has a [place] to Observation order: No  Observation education completed and documented: N/A      Olive Davis RN    Goal Outcome Evaluation:                        "

## 2023-08-22 NOTE — INTERVAL H&P NOTE
"I have reviewed the surgical (or preoperative) H&P that is linked to this encounter, and examined the patient. There are no significant changes    Clinical Conditions Present on Arrival:  Clinically Significant Risk Factors Present on Admission                 # Drug Induced Platelet Defect: home medication list includes an antiplatelet medication  # Overweight: Estimated body mass index is 26.29 kg/m  as calculated from the following:    Height as of this encounter: 1.753 m (5' 9\").    Weight as of this encounter: 80.7 kg (178 lb).       "

## 2023-08-22 NOTE — H&P
The History and Physical on patient's chart was personally reviewed today with the patient. there have been no interval changes in patient's history since H+P performed.    History:  Case is a 64yo male with longstanding left hip pain, advanced osteoarthritis. He is status post right total hip arthroplasty 5/30/2023 and has done well. He now elects for left total hip arthroplasty.    Patient elects to proceed with planned procedure. Left total hip arthroplasty.    Risks and perceived benefits of surgery again discussed with patient. Patient's questions addressed and answered. Written informed consent obtained and reviewed. Surgical site marked with indelible marker with patient's participation after confirming site with patient.      Dennis Randle M.D., M.S.  Dept. of Orthopaedic Surgery  [facility]

## 2023-08-22 NOTE — ANESTHESIA CARE TRANSFER NOTE
Patient: Case Epstein    Procedure: Procedure(s):  ARTHROPLASTY, HIP, TOTAL, DIRECT ANTERIOR APPROACH       Diagnosis: Primary osteoarthritis of left hip [M16.12]  Diagnosis Additional Information: No value filed.    Anesthesia Type:   Spinal     Note:    Oropharynx: oropharynx clear of all foreign objects  Level of Consciousness: awake  Oxygen Supplementation: face mask    Independent Airway: airway patency satisfactory and stable    Vital Signs Stable: post-procedure vital signs reviewed and stable    Patient transferred to: PACU    Handoff Report: Identifed the Patient, Identified the Reponsible Provider, Reviewed the pertinent medical history, Discussed the surgical course, Reviewed Intra-OP anesthesia mangement and issues during anesthesia, Set expectations for post-procedure period and Allowed opportunity for questions and acknowledgement of understanding      Vitals:  Vitals Value Taken Time   BP     Temp     Pulse     Resp     SpO2         Electronically Signed By: NATTY Townsend CRNA  August 22, 2023  12:40 PM

## 2023-08-22 NOTE — ANESTHESIA PROCEDURE NOTES
PENG Procedure Note    Pre-Procedure   Staff -        CRNA: Ronaldo Herman APRN CRNA       Performed By: CRNA       Location: pre-op       Procedure Start/Stop Times: 8/22/2023 9:26 AM and 8/22/2023 9:38 AM       Pre-Anesthestic Checklist: patient identified, IV checked, site marked, risks and benefits discussed, informed consent, monitors and equipment checked, pre-op evaluation, at physician/surgeon's request and post-op pain management  Timeout:       Correct Patient: Yes        Correct Procedure: Yes        Correct Site: Yes        Correct Position: Yes        Correct Laterality: Yes        Site Marked: Yes  Procedure Documentation  Procedure: PENG       Laterality: left       Patient Position: supine       Patient Prep/Sterile Barriers: sterile gloves, mask, patient draped       Skin prep: Chloraprep       Local skin infiltrated with 2 mL of 1% lidocaine.        Needle Type: insulated       Needle Gauge: 21.        Needle Length (millimeters): 100        Ultrasound guided       1. Ultrasound was used to identify targeted nerve, plexus, vascular marker, or fascial plane and place a needle adjacent to it in real-time.       2. Ultrasound was used to visualize the spread of anesthetic in close proximity to the above referenced structure.       3. A permanent image is entered into the patient's record.       4. The visualized anatomic structures appeared normal.       5. There were no apparent abnormal pathologic findings.    Assessment/Narrative         The placement was negative for: blood aspirated, painful injection and site bleeding       Paresthesias: No.       Test dose of mL lidocaine 2% w/ 1:200,000 epinephrine at.         Test dose negative, 3 minutes after injection, for signs of intravascular, subdural, or intrathecal injection.       Bolus given via needle..        Secured via.        Insertion/Infusion Method: Single Shot       Complications: none       Injection made incrementally with  "aspirations every 5 mL.    Medication(s) Administered   Medication Administration Time: 8/22/2023 9:26 AM      FOR [facility] ONLY:   Pain Team Contact information: please page the Pain Team Via Branch[employer]. Search \"Pain\". During daytime hours, please page the attending first. At night please page the resident first.      "

## 2023-08-22 NOTE — MEDICATION SCRIBE - ADMISSION MEDICATION HISTORY
Medication Scribe Admission Medication History    Admission medication history is complete. The information provided in this note is only as accurate as the sources available at the time of the update.    Medication reconciliation/reorder completed by provider prior to medication history? No    Information Source(s): Patient, Clinic records, and CareEverywhere/SureScripts via  in room with patient and finished at desk.    Pertinent Information: Patient has Stool softener, Tylenol and Ibuprofen on hand at home from last procedure.    Changes made to PTA medication list:  Added: None  Deleted: None  Changed: None    Medication Affordability:  Not including over the counter (OTC) medications, was there a time in the past 3 months when you did not take your medications as prescribed because of cost?: No    Allergies reviewed with patient and updates made in EHR: yes, no change.    Medication History Completed By: Nat Wen 8/22/2023 10:02 AM    Prior to Admission medications    Medication Sig Last Dose Taking? Auth Provider Long Term End Date   acetaminophen (TYLENOL) 325 MG tablet Take 2 tablets (650 mg) by mouth every 4 hours as needed for other (mild pain) More than a month at on hand if needed Yes Bert Mancia PA     amLODIPine (NORVASC) 5 MG tablet Take 1 tablet (5 mg) by mouth daily 8/22/2023 at 0600 Yes Tushar Granados MD Yes    aspirin (ASA) 325 MG EC tablet Take 1 tablet (325 mg) by mouth daily 8/15/2023 at am on hold Yes Bert Mancia PA     ibuprofen (ADVIL/MOTRIN) 200 MG tablet Take 200 mg by mouth every 4 hours as needed for mild pain More than a month at on hand if needed Yes Reported, Patient

## 2023-08-23 ENCOUNTER — APPOINTMENT (OUTPATIENT)
Dept: PHYSICAL THERAPY | Facility: CLINIC | Age: 63
End: 2023-08-23
Attending: ORTHOPAEDIC SURGERY
Payer: COMMERCIAL

## 2023-08-23 VITALS
TEMPERATURE: 97.9 F | HEART RATE: 110 BPM | OXYGEN SATURATION: 94 % | BODY MASS INDEX: 26.38 KG/M2 | SYSTOLIC BLOOD PRESSURE: 126 MMHG | WEIGHT: 178.13 LBS | RESPIRATION RATE: 18 BRPM | DIASTOLIC BLOOD PRESSURE: 80 MMHG | HEIGHT: 69 IN

## 2023-08-23 PROBLEM — M16.12 PRIMARY OSTEOARTHRITIS OF LEFT HIP: Status: ACTIVE | Noted: 2023-08-23

## 2023-08-23 LAB
FASTING STATUS PATIENT QL REPORTED: ABNORMAL
GLUCOSE SERPL-MCNC: 144 MG/DL (ref 70–99)
HGB BLD-MCNC: 11 G/DL (ref 13.3–17.7)

## 2023-08-23 PROCEDURE — 85018 HEMOGLOBIN: CPT | Performed by: PHYSICIAN ASSISTANT

## 2023-08-23 PROCEDURE — 250N000013 HC RX MED GY IP 250 OP 250 PS 637

## 2023-08-23 PROCEDURE — 250N000013 HC RX MED GY IP 250 OP 250 PS 637: Performed by: PHYSICIAN ASSISTANT

## 2023-08-23 PROCEDURE — 258N000003 HC RX IP 258 OP 636: Performed by: PHYSICIAN ASSISTANT

## 2023-08-23 PROCEDURE — 97161 PT EVAL LOW COMPLEX 20 MIN: CPT | Mod: GP

## 2023-08-23 PROCEDURE — 250N000011 HC RX IP 250 OP 636: Performed by: PHYSICIAN ASSISTANT

## 2023-08-23 PROCEDURE — 97116 GAIT TRAINING THERAPY: CPT | Mod: GP

## 2023-08-23 PROCEDURE — 97530 THERAPEUTIC ACTIVITIES: CPT | Mod: GP

## 2023-08-23 PROCEDURE — 99203 OFFICE O/P NEW LOW 30 MIN: CPT | Performed by: PHYSICIAN ASSISTANT

## 2023-08-23 PROCEDURE — 36415 COLL VENOUS BLD VENIPUNCTURE: CPT | Performed by: PHYSICIAN ASSISTANT

## 2023-08-23 PROCEDURE — 82947 ASSAY GLUCOSE BLOOD QUANT: CPT | Performed by: ORTHOPAEDIC SURGERY

## 2023-08-23 RX ORDER — ACETAMINOPHEN 325 MG/1
650 TABLET ORAL EVERY 4 HOURS PRN
Qty: 100 TABLET | Refills: 0 | Status: SHIPPED | OUTPATIENT
Start: 2023-08-23

## 2023-08-23 RX ORDER — HYDROXYZINE HYDROCHLORIDE 25 MG/1
25 TABLET, FILM COATED ORAL EVERY 6 HOURS PRN
Qty: 30 TABLET | Refills: 0 | Status: SHIPPED | OUTPATIENT
Start: 2023-08-23 | End: 2023-10-09

## 2023-08-23 RX ORDER — AMOXICILLIN 250 MG
1-2 CAPSULE ORAL 2 TIMES DAILY
Qty: 30 TABLET | Refills: 0 | Status: SHIPPED | OUTPATIENT
Start: 2023-08-23 | End: 2023-10-09

## 2023-08-23 RX ORDER — OXYCODONE HYDROCHLORIDE 5 MG/1
5-10 TABLET ORAL EVERY 4 HOURS PRN
Qty: 16 TABLET | Refills: 0 | Status: SHIPPED | OUTPATIENT
Start: 2023-08-23 | End: 2023-10-09

## 2023-08-23 RX ORDER — ASPIRIN 325 MG
325 TABLET, DELAYED RELEASE (ENTERIC COATED) ORAL DAILY
Qty: 30 TABLET | Refills: 0 | Status: SHIPPED | OUTPATIENT
Start: 2023-08-23 | End: 2024-05-09

## 2023-08-23 RX ADMIN — SENNOSIDES AND DOCUSATE SODIUM 1 TABLET: 50; 8.6 TABLET ORAL at 08:41

## 2023-08-23 RX ADMIN — PROCHLORPERAZINE EDISYLATE 10 MG: 5 INJECTION, SOLUTION INTRAMUSCULAR; INTRAVENOUS at 04:32

## 2023-08-23 RX ADMIN — AMLODIPINE BESYLATE 5 MG: 5 TABLET ORAL at 08:41

## 2023-08-23 RX ADMIN — CEFAZOLIN SODIUM 2 G: 2 INJECTION, SOLUTION INTRAVENOUS at 01:48

## 2023-08-23 RX ADMIN — ACETAMINOPHEN 975 MG: 325 TABLET, FILM COATED ORAL at 08:41

## 2023-08-23 RX ADMIN — SODIUM CHLORIDE, POTASSIUM CHLORIDE, SODIUM LACTATE AND CALCIUM CHLORIDE: 600; 310; 30; 20 INJECTION, SOLUTION INTRAVENOUS at 04:38

## 2023-08-23 RX ADMIN — ASPIRIN 325 MG: 325 TABLET, COATED ORAL at 08:41

## 2023-08-23 RX ADMIN — ACETAMINOPHEN 975 MG: 325 TABLET, FILM COATED ORAL at 01:47

## 2023-08-23 RX ADMIN — ONDANSETRON 4 MG: 4 TABLET, ORALLY DISINTEGRATING ORAL at 02:41

## 2023-08-23 RX ADMIN — POLYETHYLENE GLYCOL 3350 17 G: 17 POWDER, FOR SOLUTION ORAL at 08:41

## 2023-08-23 ASSESSMENT — ACTIVITIES OF DAILY LIVING (ADL)
ADLS_ACUITY_SCORE: 21
ADLS_ACUITY_SCORE: 23
ADLS_ACUITY_SCORE: 21

## 2023-08-23 NOTE — PROGRESS NOTES
"[facility] DAILY PROGRESS NOTE      History: Case Epstein is a 63 year old male POD # 1 s/p Left total hip arthroplasty on 2023 with Dr Randle. He has recently had a Right Total Hip replacement this past spring without complications.    Interval events: None    SUBJECTIVE:  Reflects understanding of needs for recovery. He has arranged for OPPT for next week.     OBJECTIVE:  /80 (BP Location: Right arm)   Pulse 110   Temp 97.9  F (36.6  C) (Oral)   Resp 18   Ht 1.753 m (5' 9\")   Wt 80.8 kg (178 lb 2.1 oz)   SpO2 94%   BMI 26.31 kg/m      Temp (24hrs), Av.7  F (36.5  C), Min:97  F (36.1  C), Max:98.7  F (37.1  C)      General: Sitting comfortably in chair on room air. Spirometry observed with proper function.    Left Lower Extremity:    Incision is covered with Aquacel dressing . No drainage noted.  Mild incisional swelling. Ecchymosis.  Intact sensation deep peroneal nerve, superficial peroneal nerve, medial and lateral plantar nerve, sural nerve, saphenous nerve.  Intact ehl, edl, ta, fhl, gs, quads, hamstrings, hip flexors  Toes warm and well perfused w/ brisk capillary refill, palpable dorsalis pedis pulse  Calves soft and nontender to squeeze.      Invalid input(s): HEMOGLOBIN 11.0. 13.6 pre-op.  No results for input(s): INR in the last 62938 hours.  Recent Labs   Lab Test 23  1039 23  0958 23  0852   POTASSIUM 4.7 4.2 4.2   CHLORIDE 105 100 107   ANIONGAP 9 12 4         PT:  Assessed. See report. Cleared for discharge. Case will be attending OPPT next week.           ASSESSMENT: Case Epstein is a 63 year old male POD # 1 s/p Left total hip arthroplasty on 2023, doing well.    PLAN: Discharge to home.  Follow Dr Randle's Discharge Instructions.  Recheck with Dr Randle in two weeks.  Aspirin 325 mg One daily for DVT Prophylaxis.  Remove dressing in 9 days. Cover with sterile dressings if any drainage noted.          "

## 2023-08-23 NOTE — PROGRESS NOTES
[place] [facility] DISCHARGE NOTE    Patient discharged to home at 8:19 PM via wheel chair. Accompanied by other:friend and staff. Discharge instructions reviewed with patient and friend , opportunity offered to ask questions. Prescriptions sent to patients preferred pharmacy. All belongings sent with patient.    Lesley Chavez RN

## 2023-08-23 NOTE — PROGRESS NOTES
Patient vital signs are at baseline: Yes  Patient able to ambulate as they were prior to admission or with assist devices provided by therapies during their stay:  Yes  Patient MUST void prior to discharge:  Yes  Patient able to tolerate oral intake:  Yes  Pain has adequate pain control using Oral analgesics:  Yes  Does patient have an identified :  Yes  Has goal D/C date and time been discussed with patient:  Yes    Patient ambulated in the [place] with Ax1, gait belt, and walker.

## 2023-08-23 NOTE — PLAN OF CARE
"Time: 4588-8590    Reason for Admission: Total left hip arthroplasty.    Activity: Assist of one, gait belt and walker.    Neuro: Patient is alert and orientated x 4.  Has been calm and cooperatvie.    Resp:  LS clear.  O2 sats 97% on RA.  No SOB.    GI/:  Voiding without difficulty.  Urine pale yellow.  C/o nausea and received PRN IV zofran 4 mg with relief for 2 hours and then became nauseated again with an episode of the hiccups.  Was given PRM IV compazine 10 mg with good relief.    Skin: Left hip surgical incision dressing is clean, dry and intact.      Diet:  Regular diet.    IV Access: PIV left lower forearm infusing LR at 100 ml/hr      Vitals: /77 (BP Location: Left arm)   Pulse 86   Temp 97.5  F (36.4  C) (Oral)   Resp 20   Ht 1.753 m (5' 9\")   Wt 80.8 kg (178 lb 2.1 oz)   SpO2 97%   BMI 26.31 kg/m      Pain: Left hip pain has been well controlled with scheduled Tylenol and PRN PO Oxycodone.      Plan: Pain control, PT    "

## 2023-08-23 NOTE — PLAN OF CARE
Physical Therapy Discharge Summary    Reason for therapy discharge:    All goals and outcomes met, no further needs identified.    Progress towards therapy goal(s). See goals on Care Plan in [facility] electronic health record for goal details.  Goals met    Therapy recommendation(s):    Continued therapy is recommended.  Rationale/Recommendations:  continued progression of strength and independence with mobility with OPPT.

## 2023-08-23 NOTE — PROGRESS NOTES
"[facility] Medicine Progress Note  Date of Service: 08/23/2023    Assessment & Plan   Case Epstein is a 63 year old male who presented on 8/22/2023 for scheduled Procedure(s):  ARTHROPLASTY, LEFT HIP, TOTAL, DIRECT ANTERIOR APPROACH by Dennis Randle MD and is being followed by the hospital medicine service for co-management of acute and/or chronic perioperative medical problems.    Primary osteoarthritis of left hip  S/p Procedure(s):  ARTHROPLASTY, LEFT HIP, TOTAL, DIRECT ANTERIOR APPROACH on 8/22/23  1 Day Post-Op   Hemoglobin 13.6 (pre-op) ? 11.0   - pain control, wound cares, physical therapy, occupational therapy and DVT prophylaxis per orthopedic surgery service    Primary hypertension  Blood pressure stable. Managed prior to admission with amlodipine 5 mg daily, continue.     Hyperlipidemia LDL goal <130  Noted on problem list. Is not on any pharmacologic therapy prior to admission.  - Defer to PCP      DVT Prophylaxis: as per orthopedic surgery service - per ortho, aspirin 325 mg daily x 4 weeks  Code Status: Full Code    Lines: Peripheral   Monk catheter: No    Discussion: Medically, the patient appears to be making appropriate post-op progress.    Disposition: Anticipate discharge today to home. No barriers to discharge from internal medicine standpoint.    Attestation:  I have reviewed today's vital signs, notes, medications, labs and imaging.  Discussed with Dr. Avelina Larose.      Elli Davis PA-C  [facility] Hospitalist Service         Interval History     Patient feeling \"not too bad\" today after surgery. Pain rated 2/10. Denies numbness or tingling.     Tolerating oral intake, denies abdominal pain, nausea, vomiting. No bowel movement since surgery, but passing flatus. Urinating without difficulty, no feelings of incomplete bladder emptying.    Denies chest pain, palpitations, cough, wheeze, shortness of breath, headache, vision changes, " dizziness, or other complaints.    Physical Exam   Temp:  [97  F (36.1  C)-98.7  F (37.1  C)] 97.9  F (36.6  C)  Pulse:  [] 110  Resp:  [10-20] 18  BP: (102-139)/(67-94) 126/80  SpO2:  [88 %-99 %] 94 %    Weights:   Vitals:    08/22/23 0830 08/22/23 1415   Weight: 80.7 kg (178 lb) 80.8 kg (178 lb 2.1 oz)    Body mass index is 26.31 kg/m .    General appearance: Awake, alert, and in no apparent distress. Pleasant and conversational, speaking in full sentences.  CV: Regular rhythm & rate, no murmurs. No edema. Peripheral pulses intact.  Respiratory: Moving air well bilaterally, no wheezing, crackles, or rhonchi.  GI: Non-distended, soft, nontender to palpation. No rebound or guarding. Normoactive bowel sounds.  Skin: Warm, dry, no rashes or ecchymoses. No mottling of skin. Aquacel dressing present on left anterior hip, clean and dry.   Musculoskeletal / extremities: Moves all extremities equally, no obvious abnormalities. Distal CMS intact.  Neurologic: No focal deficits.      Data   Recent Labs   Lab 08/23/23  0528   HGB 11.0*   *       Recent Labs   Lab 08/23/23  0528   *        Unresulted Labs Ordered in the Past 30 Days of this Admission       No orders found for last 31 day(s).             Imaging  Recent Results (from the past 24 hour(s))   XR Surgery JASON Fluoro Less Than 5 Min    Narrative    This exam was marked as non-reportable because it will not be read by a   radiologist or a [place] non-radiologist provider.         XR Pelvis Port 1/2 Views    Narrative    PELVIS PORTABLE ONE TO TWO VIEWS   8/22/2023 1:04 PM     HISTORY:  Status post hip surgery.    COMPARISON: X-ray from 7/17/2023.      Impression    IMPRESSION: There is a new left total hip arthroplasty with adjacent  gas. Excellent position and alignment of components. There is no  evidence of complication or periprosthetic fracture. No other change.    JOSÉ ANTONIO GARIBAY MD         SYSTEM ID:  [id number]        I reviewed all new  labs and imaging results over the last 24 hours. I personally reviewed no images or EKG's today.    Medications    lactated ringers Stopped (08/23/23 1132)      acetaminophen  975 mg Oral Q8H    amLODIPine  5 mg Oral Daily    aspirin  325 mg Oral Daily    polyethylene glycol  17 g Oral Daily    senna-docusate  1 tablet Oral BID    sodium chloride (PF)  3 mL Intracatheter Q8H       Elli Davis PA-C  [facility] Hospitalist Service

## 2023-08-23 NOTE — PROGRESS NOTES
08/23/23 0855   Appointment Info   Signing Clinician's Name / Credentials (PT) Ru Mancia, PT, DPT   Quick Adds   Quick Adds Certification   Living Environment   People in Home alone   Current Living Arrangements house   Home Accessibility stairs to enter home;stairs within home   Number of Stairs, Main Entrance 1  (garage into house - high threshold)   Number of Stairs, Within Home, Primary greater than 10 stairs  (laundry downstairs; landing midway)   Stair Railings, Within Home, Primary railings on both sides of stairs   Living Environment Comments Bathroom: tub/shower, grab bars, HHSH, no seat available - hasn't needed to sit since R GUSTAVO 5/30/2023. RTS with handles over the toilet.   Self-Care   Equipment Currently Used at Home walker, rolling;raised toilet seat;grab bar, tub/shower  (shoe horn, sock aide, FWW)   Fall history within last six months no   Activity/Exercise/Self-Care Comment Patient is typically indep with mobility without AD inside, outside was using a SPC just prior to surgery. Patient is typically indep with bADL, IADL including driving. 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   General Information   Onset of Illness/Injury or Date of Surgery 08/22/23   Referring Physician Bert Mancia PA   Patient/Family Therapy Goals Statement (PT) home today   Pertinent History of Current Problem (include personal factors and/or comorbidities that impact the POC) L GUSTAVO   Existing Precautions/Restrictions weight bearing  (no hip precautions)   Weight-Bearing Status - LLE weight-bearing as tolerated   Pain Assessment   Patient Currently in Pain Yes, see Vital Sign flowsheet  (tolerable)   Range of Motion (ROM)   Range of Motion ROM deficits secondary to surgical procedure;ROM is WFL   Strength (Manual Muscle Testing)   Strength (Manual Muscle Testing) Deficits observed during functional mobility;strength is WFL   Bed Mobility   Bed Mobility supine-sit;sit-supine   Supine-Sit [place]  (Bed Mobility) supervision   Sit-Supine [place] (Bed Mobility) supervision;verbal cues   Assistive Device (Bed Mobility) bed rails  (flat bed)   Transfers   Transfers sit-stand transfer   Sit-Stand Transfer   Sit-Stand [place] (Transfers) supervision   Assistive Device (Sit-Stand Transfers) walker, front-wheeled   Gait/Stairs (Locomotion)   [place] Level (Gait) supervision   Assistive Device (Gait) walker, front-wheeled   Distance in Feet initial 10ft   Distance in Feet (Gait) additional 50ft - stairs - 120ft   Negotiation (Stairs) stairs independence;handrail location;number of steps   [place] Level (Stairs) supervision;verbal cues   Handrail Location (Stairs) both sides   Number of Steps (Stairs) 2 x 2   Clinical Impression   Criteria for Skilled Therapeutic Intervention Yes, treatment indicated   PT Diagnosis (PT) impaired functional mobility   Influenced by the following impairments impaired balance, impaired strength, pain   Functional limitations due to impairments impaired bed mobility, impaired transfers, impaired gait, impaired stairs   Clinical Presentation (PT Evaluation Complexity) Stable/Uncomplicated   Clinical Presentation Rationale clinical judgement   Clinical Decision Making (Complexity) low complexity   Planned Therapy Interventions (PT) bed mobility training;gait training;home exercise program;neuromuscular re-education;patient/family education;stair training;strengthening;transfer training;progressive activity/exercise   Anticipated Equipment Needs at Discharge (PT)   (has all DME needed)   Risk & Benefits of therapy have been explained evaluation/treatment results reviewed;care plan/treatment goals reviewed;participants voiced agreement with care plan;participants included;patient   PT Total Evaluation Time   PT Eval, Low Complexity Minutes (98594) 10   Therapy Certification   Start of care date 08/23/23   Certification date from 08/23/23   Certification date to 08/23/23    Medical Diagnosis L GUSTAVO   Physical Therapy Goals   PT Frequency One time eval and treatment only   PT Predicted Duration/Target Date for Goal Attainment 08/23/23   PT Goals Transfers;Gait;Stairs   PT: Transfers Modified independent;Sit to/from stand;Bed to/from chair;Assistive device;Goal Met   PT: Gait Modified independent;Rolling walker;100 feet;Goal Met  (FWW)   PT: Stairs Supervision/stand-by assist;2 stairs;Rail on both sides;Goal Met   Interventions   Interventions Quick Adds Gait Training;Therapeutic Activity   Therapeutic Activity   Therapeutic Activities: dynamic activities to improve functional performance Minutes (15579) 15   Treatment Detail/Skilled Intervention Discussed sit>supine and hooking LLE if needed at home - patient surprised he managed BLE without concern on eval/treat. Discussed: icing, home safety, LE exercises, OPPT - patient to schedule when he gets home, having friend present initially for stairs and until he gets settled in. Patient appears safe to mobilize on his own with FWW, RN reports patient mobilizing mod indep last night and in agreement that patient does not require alarms at this time.   Gait Training   Gait Training Minutes (81835) 10   Symptoms Noted During/After Treatment (Gait Training) fatigue   Treatment Detail/Skilled Intervention Patient able to follow cues for improved gait pattern - step through, with FWW without LOB. Patient able to demonstrate increased gait distance as comfort with mobility improved. Patient able to improve gait speed and stability as his comfort level improved. Cues for breathing and relaxing B shoulders. Patient required cues on stairs for ascend/descend.   [place] Level (Gait Training)   (mod indep)   Physical Assistance Level (Gait Training) verbal cues   Assistive Device (Gait Training) rolling walker  (FWW)   PT Discharge Planning   PT Plan dc PT - mod indep on the unit with FWW   PT Discharge Recommendation (DC Rec) home with  outpatient physical therapy   PT Rationale for DC Rec Patient appears safe to transition home with friend assisting as needed for the first couple of days and OPPT. Patient mobilizing safely with FWW and cleared to be mod indep on the unit when IV is not attached.   PT Brief overview of current status mod indep bed mobility, mod indep transfers FWW, mod indep gait 100ft FWW, SBA 2 stairs B rail   Total Session Time   Timed Code Treatment Minutes 25   Total Session Time (sum of timed and untimed services) 35       [facility]  OUTPATIENT PHYSICAL THERAPY EVALUATION  PLAN OF TREATMENT FOR OUTPATIENT REHABILITATION  (COMPLETE FOR INITIAL CLAIMS ONLY)  Patient's Last Name, First Name, M.I.  YOB: 1960  Case Epstein                        Provider's Name  [facility] Medical Record No.  [id number]                             Onset Date:  08/22/23   Start of Care Date:  08/23/23   Type:     _X_PT   ___OT   ___SLP Medical Diagnosis:  L GUSTAVO              PT Diagnosis:  impaired functional mobility Visits from SOC:  1     See note for plan of treatment, functional goals and certification details    I CERTIFY THE NEED FOR THESE SERVICES FURNISHED UNDER        THIS PLAN OF TREATMENT AND WHILE UNDER MY CARE     (Physician co-signature of this document indicates review and certification of the therapy plan).

## 2023-08-23 NOTE — CONSULTS
Care Management Note:    Care Management team received referral from Ortho team to assist pt with discharge planning services post surgical services.    Per IDT rounds, EMR review, and/or discussion with PT/OT staff, it has been determined that pt will discharge to home and attend outpatient therapy services.    Care Management will close referral at this time.    Noni Nunez RN  Care Transitions  [phone]

## 2023-09-06 ENCOUNTER — OFFICE VISIT (OUTPATIENT)
Dept: ORTHOPEDICS | Facility: CLINIC | Age: 63
End: 2023-09-06
Payer: COMMERCIAL

## 2023-09-06 ENCOUNTER — ANCILLARY PROCEDURE (OUTPATIENT)
Dept: GENERAL RADIOLOGY | Facility: CLINIC | Age: 63
End: 2023-09-06
Attending: PHYSICIAN ASSISTANT
Payer: COMMERCIAL

## 2023-09-06 DIAGNOSIS — Z96.642 STATUS POST TOTAL REPLACEMENT OF LEFT HIP: Primary | ICD-10-CM

## 2023-09-06 DIAGNOSIS — Z96.642 STATUS POST TOTAL REPLACEMENT OF LEFT HIP: ICD-10-CM

## 2023-09-06 PROCEDURE — 73502 X-RAY EXAM HIP UNI 2-3 VIEWS: CPT | Mod: TC | Performed by: RADIOLOGY

## 2023-09-06 PROCEDURE — 99024 POSTOP FOLLOW-UP VISIT: CPT | Performed by: ORTHOPAEDIC SURGERY

## 2023-09-06 NOTE — LETTER
9/6/2023         RE: Case Epstein  [address] [facility]  [place] [address]        Dear Colleague,    Thank you for referring your patient, Case Epstein, to the [facility] [place]. Please see a copy of my visit note below.      CHIEF COMPLAINT: No chief complaint on file.        SURGERY: Total hip arthroplasty, left hip, anterior approach ([facility])  DATE OF SURGERY: 8/22/2023      HISTORY OF PRESENT ILLNESS: Case Epstein is a 63 year old male seen for postoperative evaluation of left total hip arthroplasty. It has been 2 weeks since surgery. Returns today stating doing well. Denies any wound problems. Denies numbness, tingling, or weakness in the affected extremity. Denies fevers chills night sweats. Continues to take aspirin for anti-coagulation for deep vein thrombosis prophylaxis. Use of pain medications has been over the counter .  Concerns today include: none.        PAST MEDICAL HISTORY:   Past Medical History:   Diagnosis Date     High cholesterol      OA (OSTEOARTHRITIS) OF KNEE - right 09/27/2011     Primary hypertension 8/22/2023     Patient Active Problem List   Diagnosis     Primary osteoarthritis of right knee     Hyperlipidemia LDL goal <130     Vitamin D deficiency disease     Advanced directives, counseling/discussion     CARDIOVASCULAR SCREENING; LDL GOAL LESS THAN 100     Pain in joint, ankle and foot, left     Primary osteoarthritis of right hip     S/P total left hip arthroplasty     Primary hypertension     Primary osteoarthritis of left hip       PAST SURGICAL HISTORY:   Past Surgical History:   Procedure Laterality Date     ARTHRODESIS ANKLE Left 11/20/2017    Procedure: ARTHRODESIS ANKLE;  LEFT ANKLE FUSION (C-ARM);  Surgeon: Shaggy Linn MD;  [facility]     ARTHROPLASTY HIP ANTERIOR Right 5/30/2023    Procedure: Right ARTHROPLASTY HIP TOTAL DIRECT ANTERIOR APPROACH;  Surgeon: Dennis Randle MD;  [facility]      ARTHROPLASTY HIP ANTERIOR Left 8/22/2023    Procedure: ARTHROPLASTY, LEFT HIP, TOTAL, DIRECT ANTERIOR APPROACH;  Surgeon: Dennis Randle MD;  Location: [facility]     COLONOSCOPY       ORTHOPEDIC SURGERY Right     right TKA May 2021     [facility] AFF PALATE/UVULA SURGERY UNLISTED  age 2 and in 1986    2 procedures for palate/nasal issues       Medications:   Current Outpatient Medications:      acetaminophen (TYLENOL) 325 MG tablet, Take 2 tablets (650 mg) by mouth every 4 hours as needed for other (mild pain), Disp: 100 tablet, Rfl: 0     amLODIPine (NORVASC) 5 MG tablet, Take 1 tablet (5 mg) by mouth daily, Disp: 90 tablet, Rfl: 3     aspirin (ASA) 325 MG EC tablet, Take 1 tablet (325 mg) by mouth daily, Disp: 30 tablet, Rfl: 0     hydrOXYzine (ATARAX) 25 MG tablet, Take 1 tablet (25 mg) by mouth every 6 hours as needed for itching or anxiety (with pain, moderate pain), Disp: 30 tablet, Rfl: 0     ibuprofen (ADVIL/MOTRIN) 200 MG tablet, Take 200 mg by mouth every 4 hours as needed for mild pain, Disp: , Rfl:      oxyCODONE (ROXICODONE) 5 MG tablet, Take 1-2 tablets (5-10 mg) by mouth every 4 hours as needed for moderate to severe pain, Disp: 16 tablet, Rfl: 0     senna-docusate (SENOKOT-S/PERICOLACE) 8.6-50 MG tablet, Take 1-2 tablets by mouth 2 times daily Take while on oral narcotics to prevent or treat constipation., Disp: 30 tablet, Rfl: 0    Allergies: No Known Allergies      REVIEW OF SYSTEMS:  CONSTITUTIONAL:NEGATIVE for fever, chills, night sweats, change in weight  INTEGUMENTARY/SKIN: NEGATIVE for worrisome rashes, moles or lesions  MUSCULOSKELETAL:See HPI above  NEURO: NEGATIVE for weakness, dizziness or paresthesias    PHYSICAL EXAM:  There were no vitals taken for this visit.   GENERAL APPEARANCE: healthy, alert, no distress  SKIN: no suspicious lesions or rashes  NEURO: Normal strength and tone, mentation intact and speech normal  PSYCH:  mentation appears normal and affect normal/bright  RESPIRATORY: No  increased work of breathing.    BILATERAL LOWER EXTREMITIES:  Gait: slight favors the left.    Bilateral calf soft and nttp or squeeze.    left lower extremity:  Intact sensation deep peroneal nerve, superficial peroneal nerve, med/lat tibial nerve, sural nerve, saphenous nerve  Intact EHL, EDL, TA, FHL, GS, quadriceps hamstrings and hip flexors  Edema: none    left HIP EXAM:    Incision: healing well. Small scab proximal. Mild swelling.  No erythema. No ecchymosis. No induration. Dry. .  Palpation: Tender:   incisional, tensor  Hip range of motion: normal, all pain free  Leg lengths: grossly symmetric at medial malleolus.      X-RAY:  AP pelvis, lateral views left hip from 9/6/2023 were reviewed in clinic today. No obvious fractures or dislocations. Interval left Hip arthroplasty components in place without obvious failure, complication, fracture, or dislocation. Stable right total hip arthroplasty.      Impression: 63 year old male seen for postoperative evaluation of left total hip arthroplasty. It has been 2 weeks since surgery. Doing well.      Plan:   * reviewed xrays with patient today showing total hip arthroplasty implants.  * continue DVT prophylaxis for a total of 4 weeks postoperative  * wean off all pain medications as tolerated  * xrays next visit: lowset AP pelvis and lateral hip  * return to clinic 4 weeks, sooner if needed.  * all questions addressed and answered prior to discharge from clinic today to patient's satisfaction.  * patient will need longterm (at least 2 years) prophylactic antibiotics use with dental procedures or other invasive procedures (2 g amoxicilin or  2g Keflex or 500mg azithromycin or clarithromycin or 100mg doxycycline) 30-60 minutes prior to dental procedures.        Dennis Randle M.D., M.S.  Dept. of Orthopaedic Surgery  [facility]             Again, thank you for allowing me to participate in the care of your patient.        Sincerely,        Dennis Hamilton  MD Jer

## 2023-09-06 NOTE — PROGRESS NOTES
CHIEF COMPLAINT: No chief complaint on file.        SURGERY: Total hip arthroplasty, left hip, anterior approach ([facility])  DATE OF SURGERY: 8/22/2023      HISTORY OF PRESENT ILLNESS: Case Epstein is a 63 year old male seen for postoperative evaluation of left total hip arthroplasty. It has been 2 weeks since surgery. Returns today stating doing well. Denies any wound problems. Denies numbness, tingling, or weakness in the affected extremity. Denies fevers chills night sweats. Continues to take aspirin for anti-coagulation for deep vein thrombosis prophylaxis. Use of pain medications has been over the counter .  Concerns today include: none.        PAST MEDICAL HISTORY:   Past Medical History:   Diagnosis Date    High cholesterol     OA (OSTEOARTHRITIS) OF KNEE - right 09/27/2011    Primary hypertension 8/22/2023     Patient Active Problem List   Diagnosis    Primary osteoarthritis of right knee    Hyperlipidemia LDL goal <130    Vitamin D deficiency disease    Advanced directives, counseling/discussion    CARDIOVASCULAR SCREENING; LDL GOAL LESS THAN 100    Pain in joint, ankle and foot, left    Primary osteoarthritis of right hip    S/P total left hip arthroplasty    Primary hypertension    Primary osteoarthritis of left hip       PAST SURGICAL HISTORY:   Past Surgical History:   Procedure Laterality Date    ARTHRODESIS ANKLE Left 11/20/2017    Procedure: ARTHRODESIS ANKLE;  LEFT ANKLE FUSION (C-ARM);  Surgeon: Shaggy Linn MD;  Location: [facility]    ARTHROPLASTY HIP ANTERIOR Right 5/30/2023    Procedure: Right ARTHROPLASTY HIP TOTAL DIRECT ANTERIOR APPROACH;  Surgeon: Dennis Randle MD;  Location: [facility]    ARTHROPLASTY HIP ANTERIOR Left 8/22/2023    Procedure: ARTHROPLASTY, LEFT HIP, TOTAL, DIRECT ANTERIOR APPROACH;  Surgeon: Dennis Randle MD;  Location: [facility]    COLONOSCOPY      ORTHOPEDIC SURGERY Right     right TKA May 2021    [facility] AFF PALATE/UVULA SURGERY UNLISTED  age  2 and in 1986    2 procedures for palate/nasal issues       Medications:   Current Outpatient Medications:     acetaminophen (TYLENOL) 325 MG tablet, Take 2 tablets (650 mg) by mouth every 4 hours as needed for other (mild pain), Disp: 100 tablet, Rfl: 0    amLODIPine (NORVASC) 5 MG tablet, Take 1 tablet (5 mg) by mouth daily, Disp: 90 tablet, Rfl: 3    aspirin (ASA) 325 MG EC tablet, Take 1 tablet (325 mg) by mouth daily, Disp: 30 tablet, Rfl: 0    hydrOXYzine (ATARAX) 25 MG tablet, Take 1 tablet (25 mg) by mouth every 6 hours as needed for itching or anxiety (with pain, moderate pain), Disp: 30 tablet, Rfl: 0    ibuprofen (ADVIL/MOTRIN) 200 MG tablet, Take 200 mg by mouth every 4 hours as needed for mild pain, Disp: , Rfl:     oxyCODONE (ROXICODONE) 5 MG tablet, Take 1-2 tablets (5-10 mg) by mouth every 4 hours as needed for moderate to severe pain, Disp: 16 tablet, Rfl: 0    senna-docusate (SENOKOT-S/PERICOLACE) 8.6-50 MG tablet, Take 1-2 tablets by mouth 2 times daily Take while on oral narcotics to prevent or treat constipation., Disp: 30 tablet, Rfl: 0    Allergies: No Known Allergies      REVIEW OF SYSTEMS:  CONSTITUTIONAL:NEGATIVE for fever, chills, night sweats, change in weight  INTEGUMENTARY/SKIN: NEGATIVE for worrisome rashes, moles or lesions  MUSCULOSKELETAL:See HPI above  NEURO: NEGATIVE for weakness, dizziness or paresthesias    PHYSICAL EXAM:  There were no vitals taken for this visit.   GENERAL APPEARANCE: healthy, alert, no distress  SKIN: no suspicious lesions or rashes  NEURO: Normal strength and tone, mentation intact and speech normal  PSYCH:  mentation appears normal and affect normal/bright  RESPIRATORY: No increased work of breathing.    BILATERAL LOWER EXTREMITIES:  Gait: slight favors the left.    Bilateral calf soft and nttp or squeeze.    left lower extremity:  Intact sensation deep peroneal nerve, superficial peroneal nerve, med/lat tibial nerve, sural nerve, saphenous nerve  Intact  EHL, EDL, TA, FHL, GS, quadriceps hamstrings and hip flexors  Edema: none    left HIP EXAM:    Incision: healing well. Small scab proximal. Mild swelling.  No erythema. No ecchymosis. No induration. Dry. .  Palpation: Tender:   incisional, tensor  Hip range of motion: normal, all pain free  Leg lengths: grossly symmetric at medial malleolus.      X-RAY:  AP pelvis, lateral views left hip from 9/6/2023 were reviewed in clinic today. No obvious fractures or dislocations. Interval left Hip arthroplasty components in place without obvious failure, complication, fracture, or dislocation. Stable right total hip arthroplasty.      Impression: 63 year old male seen for postoperative evaluation of left total hip arthroplasty. It has been 2 weeks since surgery. Doing well.      Plan:   * reviewed xrays with patient today showing total hip arthroplasty implants.  * continue DVT prophylaxis for a total of 4 weeks postoperative  * wean off all pain medications as tolerated  * xrays next visit: lowset AP pelvis and lateral hip  * return to clinic 4 weeks, sooner if needed.  * all questions addressed and answered prior to discharge from clinic today to patient's satisfaction.  * patient will need longterm (at least 2 years) prophylactic antibiotics use with dental procedures or other invasive procedures (2 g amoxicilin or  2g Keflex or 500mg azithromycin or clarithromycin or 100mg doxycycline) 30-60 minutes prior to dental procedures.        Dennis Randle M.D., M.S.  Dept. of Orthopaedic Surgery  [facility]

## 2023-10-06 NOTE — PROGRESS NOTES
CHIEF COMPLAINT:   Chief Complaint   Patient presents with    Left Hip - Total Joint Post-op     DOS 8/22/23, 7 wk s/p. Patient notes his hip is doing just fine. No issues or concerns. Denies any pain.         SURGERY: Total hip arthroplasty, left hip, anterior approach ([facility])  DATE OF SURGERY: 8/22/2023      HISTORY OF PRESENT ILLNESS: Case Epstein is a 63 year old male seen for postoperative evaluation of left total hip arthroplasty. It has been 7 weeks since surgery. Returns today stating doing well. Denies pain. No issues or concerns.     Denies any wound problems. Denies numbness, tingling, or weakness in the affected extremity. Denies fevers chills night sweats.    He is status post right total hip arthroplasty 5/30/2023.  He is status post right total knee arthroplasty 5/11/2021.    PAST MEDICAL HISTORY:   Past Medical History:   Diagnosis Date    High cholesterol     OA (OSTEOARTHRITIS) OF KNEE - right 09/27/2011    Primary hypertension 8/22/2023     Patient Active Problem List   Diagnosis    Primary osteoarthritis of right knee    Hyperlipidemia LDL goal <130    Vitamin D deficiency disease    Advanced directives, counseling/discussion    CARDIOVASCULAR SCREENING; LDL GOAL LESS THAN 100    Pain in joint, ankle and foot, left    Primary osteoarthritis of right hip    S/P total left hip arthroplasty    Primary hypertension    Primary osteoarthritis of left hip       PAST SURGICAL HISTORY:   Past Surgical History:   Procedure Laterality Date    ARTHRODESIS ANKLE Left 11/20/2017    Procedure: ARTHRODESIS ANKLE;  LEFT ANKLE FUSION (C-ARM);  Surgeon: Shaggy Linn MD;  Location: [facility]    ARTHROPLASTY HIP ANTERIOR Right 5/30/2023    Procedure: Right ARTHROPLASTY HIP TOTAL DIRECT ANTERIOR APPROACH;  Surgeon: Dennis Randle MD;  Location: [facility]    ARTHROPLASTY HIP ANTERIOR Left 8/22/2023    Procedure: ARTHROPLASTY, LEFT HIP, TOTAL, DIRECT ANTERIOR APPROACH;  Surgeon: Jer  "Dennis Hamilton MD;  Location: [facility]    COLONOSCOPY      ORTHOPEDIC SURGERY Right     right TKA May 2021    ZZC AFF PALATE/UVULA SURGERY UNLISTED  age 2 and in 1986    2 procedures for palate/nasal issues       Medications:   Current Outpatient Medications:     acetaminophen (TYLENOL) 325 MG tablet, Take 2 tablets (650 mg) by mouth every 4 hours as needed for other (mild pain), Disp: 100 tablet, Rfl: 0    amLODIPine (NORVASC) 5 MG tablet, Take 1 tablet (5 mg) by mouth daily, Disp: 90 tablet, Rfl: 3    aspirin (ASA) 325 MG EC tablet, Take 1 tablet (325 mg) by mouth daily, Disp: 30 tablet, Rfl: 0    hydrOXYzine (ATARAX) 25 MG tablet, Take 1 tablet (25 mg) by mouth every 6 hours as needed for itching or anxiety (with pain, moderate pain), Disp: 30 tablet, Rfl: 0    ibuprofen (ADVIL/MOTRIN) 200 MG tablet, Take 200 mg by mouth every 4 hours as needed for mild pain, Disp: , Rfl:     oxyCODONE (ROXICODONE) 5 MG tablet, Take 1-2 tablets (5-10 mg) by mouth every 4 hours as needed for moderate to severe pain, Disp: 16 tablet, Rfl: 0    senna-docusate (SENOKOT-S/PERICOLACE) 8.6-50 MG tablet, Take 1-2 tablets by mouth 2 times daily Take while on oral narcotics to prevent or treat constipation., Disp: 30 tablet, Rfl: 0    Allergies: No Known Allergies      REVIEW OF SYSTEMS:  CONSTITUTIONAL:NEGATIVE for fever, chills, night sweats, change in weight  INTEGUMENTARY/SKIN: NEGATIVE for worrisome rashes, moles or lesions  MUSCULOSKELETAL:See HPI above  NEURO: NEGATIVE for weakness, dizziness or paresthesias    PHYSICAL EXAM:  /79   Pulse 86   Ht 1.753 m (5' 9\")   Wt 78.3 kg (172 lb 11.2 oz)   BMI 25.50 kg/m     GENERAL APPEARANCE: healthy, alert, no distress  SKIN: no suspicious lesions or rashes  NEURO: Normal strength and tone, mentation intact and speech normal  PSYCH:  mentation appears normal and affect normal/bright  RESPIRATORY: No increased work of breathing.    BILATERAL LOWER EXTREMITIES:  Gait: normal.    Bilateral " calf soft and nttp or squeeze.    left lower extremity:  Intact sensation deep peroneal nerve, superficial peroneal nerve, med/lat tibial nerve, sural nerve, saphenous nerve  Intact EHL, EDL, TA, FHL, GS, quadriceps hamstrings and hip flexors  Edema: none    left HIP EXAM:    Incision: healed well. No scabs or wounds. Dry.  No erythema. No ecchymosis. No induration.    Palpation: Tender:   incisional, tensor  Hip range of motion: normal, all pain free  Leg lengths: grossly symmetric at medial malleolus.      X-RAY:  AP pelvis, lateral views left hip from 10/9/2023 were reviewed in clinic today. No obvious fractures or dislocations.  left Hip arthroplasty components in place without obvious failure, complication, fracture, or dislocation. Stable right total hip arthroplasty.      Impression: 63 year old male seen for postoperative evaluation of left total hip arthroplasty. It has been 7 weeks since surgery. Doing well.      Plan:   * reviewed xrays with patient today showing total hip arthroplasty implants.  * xrays next visit: lowset AP pelvis and lateral hip  * return to clinic 7/2024 sooner if needed, for 1 year followup on both hips.  * all questions addressed and answered prior to discharge from clinic today to patient's satisfaction.  * patient will need longterm (at least 2 years) prophylactic antibiotics use with dental procedures or other invasive procedures (2 g amoxicilin or  2g Keflex or 500mg azithromycin or clarithromycin or 100mg doxycycline) 30-60 minutes prior to dental procedures.        Dennis Randle M.D., M.S.  Dept. of Orthopaedic Surgery  [facility]

## 2023-10-09 ENCOUNTER — ANCILLARY PROCEDURE (OUTPATIENT)
Dept: GENERAL RADIOLOGY | Facility: CLINIC | Age: 63
End: 2023-10-09
Attending: ORTHOPAEDIC SURGERY
Payer: COMMERCIAL

## 2023-10-09 ENCOUNTER — OFFICE VISIT (OUTPATIENT)
Dept: ORTHOPEDICS | Facility: CLINIC | Age: 63
End: 2023-10-09
Payer: COMMERCIAL

## 2023-10-09 VITALS
SYSTOLIC BLOOD PRESSURE: 133 MMHG | BODY MASS INDEX: 25.58 KG/M2 | HEART RATE: 86 BPM | WEIGHT: 172.7 LBS | HEIGHT: 69 IN | DIASTOLIC BLOOD PRESSURE: 79 MMHG

## 2023-10-09 DIAGNOSIS — Z96.642 STATUS POST TOTAL REPLACEMENT OF LEFT HIP: ICD-10-CM

## 2023-10-09 DIAGNOSIS — Z96.642 STATUS POST TOTAL REPLACEMENT OF LEFT HIP: Primary | ICD-10-CM

## 2023-10-09 PROCEDURE — 73502 X-RAY EXAM HIP UNI 2-3 VIEWS: CPT | Mod: TC | Performed by: RADIOLOGY

## 2023-10-09 PROCEDURE — 99024 POSTOP FOLLOW-UP VISIT: CPT | Performed by: ORTHOPAEDIC SURGERY

## 2023-10-09 ASSESSMENT — PAIN SCALES - GENERAL: PAINLEVEL: NO PAIN (0)

## 2023-10-09 NOTE — LETTER
10/9/2023         RE: Case Epstein  [address] [facility]  [place] [address]        Dear Colleague,    Thank you for referring your patient, Case Epstein, to the [facility] [place]. Please see a copy of my visit note below.      CHIEF COMPLAINT:   Chief Complaint   Patient presents with     Left Hip - Total Joint Post-op     DOS 8/22/23, 7 wk s/p. Patient notes his hip is doing just fine. No issues or concerns. Denies any pain.         SURGERY: Total hip arthroplasty, left hip, anterior approach ([facility])  DATE OF SURGERY: 8/22/2023      HISTORY OF PRESENT ILLNESS: Case Epstein is a 63 year old male seen for postoperative evaluation of left total hip arthroplasty. It has been 7 weeks since surgery. Returns today stating doing well. Denies pain. No issues or concerns.     Denies any wound problems. Denies numbness, tingling, or weakness in the affected extremity. Denies fevers chills night sweats.    He is status post right total hip arthroplasty 5/30/2023.  He is status post right total knee arthroplasty 5/11/2021.    PAST MEDICAL HISTORY:   Past Medical History:   Diagnosis Date     High cholesterol      OA (OSTEOARTHRITIS) OF KNEE - right 09/27/2011     Primary hypertension 8/22/2023     Patient Active Problem List   Diagnosis     Primary osteoarthritis of right knee     Hyperlipidemia LDL goal <130     Vitamin D deficiency disease     Advanced directives, counseling/discussion     CARDIOVASCULAR SCREENING; LDL GOAL LESS THAN 100     Pain in joint, ankle and foot, left     Primary osteoarthritis of right hip     S/P total left hip arthroplasty     Primary hypertension     Primary osteoarthritis of left hip       PAST SURGICAL HISTORY:   Past Surgical History:   Procedure Laterality Date     ARTHRODESIS ANKLE Left 11/20/2017    Procedure: ARTHRODESIS ANKLE;  LEFT ANKLE FUSION (C-ARM);  Surgeon: Shaggy Linn MD;  Location: [facility]  "HIP ANTERIOR Right 5/30/2023    Procedure: Right ARTHROPLASTY HIP TOTAL DIRECT ANTERIOR APPROACH;  Surgeon: Dennis Randle MD;  Location: [facility]     ARTHROPLASTY HIP ANTERIOR Left 8/22/2023    Procedure: ARTHROPLASTY, LEFT HIP, TOTAL, DIRECT ANTERIOR APPROACH;  Surgeon: Dennis Randle MD;  Location: [facility]     COLONOSCOPY       ORTHOPEDIC SURGERY Right     right TKA May 2021     ZZC AFF PALATE/UVULA SURGERY UNLISTED  age 2 and in 1986    2 procedures for palate/nasal issues       Medications:   Current Outpatient Medications:      acetaminophen (TYLENOL) 325 MG tablet, Take 2 tablets (650 mg) by mouth every 4 hours as needed for other (mild pain), Disp: 100 tablet, Rfl: 0     amLODIPine (NORVASC) 5 MG tablet, Take 1 tablet (5 mg) by mouth daily, Disp: 90 tablet, Rfl: 3     aspirin (ASA) 325 MG EC tablet, Take 1 tablet (325 mg) by mouth daily, Disp: 30 tablet, Rfl: 0     hydrOXYzine (ATARAX) 25 MG tablet, Take 1 tablet (25 mg) by mouth every 6 hours as needed for itching or anxiety (with pain, moderate pain), Disp: 30 tablet, Rfl: 0     ibuprofen (ADVIL/MOTRIN) 200 MG tablet, Take 200 mg by mouth every 4 hours as needed for mild pain, Disp: , Rfl:      oxyCODONE (ROXICODONE) 5 MG tablet, Take 1-2 tablets (5-10 mg) by mouth every 4 hours as needed for moderate to severe pain, Disp: 16 tablet, Rfl: 0     senna-docusate (SENOKOT-S/PERICOLACE) 8.6-50 MG tablet, Take 1-2 tablets by mouth 2 times daily Take while on oral narcotics to prevent or treat constipation., Disp: 30 tablet, Rfl: 0    Allergies: No Known Allergies      REVIEW OF SYSTEMS:  CONSTITUTIONAL:NEGATIVE for fever, chills, night sweats, change in weight  INTEGUMENTARY/SKIN: NEGATIVE for worrisome rashes, moles or lesions  MUSCULOSKELETAL:See HPI above  NEURO: NEGATIVE for weakness, dizziness or paresthesias    PHYSICAL EXAM:  /79   Pulse 86   Ht 1.753 m (5' 9\")   Wt 78.3 kg (172 lb 11.2 oz)   BMI 25.50 kg/m     GENERAL APPEARANCE: healthy, " alert, no distress  SKIN: no suspicious lesions or rashes  NEURO: Normal strength and tone, mentation intact and speech normal  PSYCH:  mentation appears normal and affect normal/bright  RESPIRATORY: No increased work of breathing.    BILATERAL LOWER EXTREMITIES:  Gait: normal.    Bilateral calf soft and nttp or squeeze.    left lower extremity:  Intact sensation deep peroneal nerve, superficial peroneal nerve, med/lat tibial nerve, sural nerve, saphenous nerve  Intact EHL, EDL, TA, FHL, GS, quadriceps hamstrings and hip flexors  Edema: none    left HIP EXAM:    Incision: healed well. No scabs or wounds. Dry.  No erythema. No ecchymosis. No induration.    Palpation: Tender:   incisional, tensor  Hip range of motion: normal, all pain free  Leg lengths: grossly symmetric at medial malleolus.      X-RAY:  AP pelvis, lateral views left hip from 10/9/2023 were reviewed in clinic today. No obvious fractures or dislocations.  left Hip arthroplasty components in place without obvious failure, complication, fracture, or dislocation. Stable right total hip arthroplasty.      Impression: 63 year old male seen for postoperative evaluation of left total hip arthroplasty. It has been 7 weeks since surgery. Doing well.      Plan:   * reviewed xrays with patient today showing total hip arthroplasty implants.  * xrays next visit: lowset AP pelvis and lateral hip  * return to clinic 7/2024 sooner if needed, for 1 year followup on both hips.  * all questions addressed and answered prior to discharge from clinic today to patient's satisfaction.  * patient will need longterm (at least 2 years) prophylactic antibiotics use with dental procedures or other invasive procedures (2 g amoxicilin or  2g Keflex or 500mg azithromycin or clarithromycin or 100mg doxycycline) 30-60 minutes prior to dental procedures.        Dennis Randle M.D., M.S.  Dept. of Orthopaedic Surgery  [facility]             Again, thank you for allowing me  to participate in the care of your patient.        Sincerely,        Dennis Randle MD

## 2023-10-11 ENCOUNTER — IMMUNIZATION (OUTPATIENT)
Dept: FAMILY MEDICINE | Facility: CLINIC | Age: 63
End: 2023-10-11
Payer: COMMERCIAL

## 2023-10-11 DIAGNOSIS — Z23 NEED FOR PROPHYLACTIC VACCINATION AND INOCULATION AGAINST INFLUENZA: Primary | ICD-10-CM

## 2023-10-11 DIAGNOSIS — Z23 HIGH PRIORITY FOR 2019-NCOV VACCINE: ICD-10-CM

## 2023-10-11 PROCEDURE — 99207 PR NO CHARGE NURSE ONLY: CPT

## 2023-10-11 PROCEDURE — 91320 SARSCV2 VAC 30MCG TRS-SUC IM: CPT

## 2023-10-11 PROCEDURE — 90682 RIV4 VACC RECOMBINANT DNA IM: CPT

## 2023-10-11 PROCEDURE — 90480 ADMN SARSCOV2 VAC 1/ONLY CMP: CPT

## 2023-10-11 PROCEDURE — 90471 IMMUNIZATION ADMIN: CPT

## 2024-03-25 ENCOUNTER — PATIENT OUTREACH (OUTPATIENT)
Dept: CARE COORDINATION | Facility: CLINIC | Age: 64
End: 2024-03-25
Payer: COMMERCIAL

## 2024-04-08 ENCOUNTER — PATIENT OUTREACH (OUTPATIENT)
Dept: CARE COORDINATION | Facility: CLINIC | Age: 64
End: 2024-04-08
Payer: COMMERCIAL

## 2024-05-07 SDOH — HEALTH STABILITY: PHYSICAL HEALTH: ON AVERAGE, HOW MANY DAYS PER WEEK DO YOU ENGAGE IN MODERATE TO STRENUOUS EXERCISE (LIKE A BRISK WALK)?: 7 DAYS

## 2024-05-07 SDOH — HEALTH STABILITY: PHYSICAL HEALTH: ON AVERAGE, HOW MANY MINUTES DO YOU ENGAGE IN EXERCISE AT THIS LEVEL?: 20 MIN

## 2024-05-07 ASSESSMENT — SOCIAL DETERMINANTS OF HEALTH (SDOH): HOW OFTEN DO YOU GET TOGETHER WITH FRIENDS OR RELATIVES?: MORE THAN THREE TIMES A WEEK

## 2024-05-09 ENCOUNTER — OFFICE VISIT (OUTPATIENT)
Dept: FAMILY MEDICINE | Facility: CLINIC | Age: 64
End: 2024-05-09
Payer: COMMERCIAL

## 2024-05-09 VITALS
HEIGHT: 70 IN | RESPIRATION RATE: 19 BRPM | WEIGHT: 185 LBS | HEART RATE: 73 BPM | DIASTOLIC BLOOD PRESSURE: 88 MMHG | BODY MASS INDEX: 26.48 KG/M2 | TEMPERATURE: 97.6 F | SYSTOLIC BLOOD PRESSURE: 136 MMHG | OXYGEN SATURATION: 98 %

## 2024-05-09 DIAGNOSIS — I10 HYPERTENSION GOAL BP (BLOOD PRESSURE) < 140/90: ICD-10-CM

## 2024-05-09 DIAGNOSIS — Z00.00 ROUTINE GENERAL MEDICAL EXAMINATION AT A HEALTH CARE FACILITY: Primary | ICD-10-CM

## 2024-05-09 DIAGNOSIS — R53.83 FATIGUE, UNSPECIFIED TYPE: ICD-10-CM

## 2024-05-09 DIAGNOSIS — E78.5 HYPERLIPIDEMIA LDL GOAL <100: ICD-10-CM

## 2024-05-09 DIAGNOSIS — R73.01 IMPAIRED FASTING GLUCOSE: ICD-10-CM

## 2024-05-09 DIAGNOSIS — Z23 ENCOUNTER FOR IMMUNIZATION: ICD-10-CM

## 2024-05-09 DIAGNOSIS — Z12.11 SCREEN FOR COLON CANCER: ICD-10-CM

## 2024-05-09 DIAGNOSIS — Z12.5 SCREENING FOR PROSTATE CANCER: ICD-10-CM

## 2024-05-09 LAB
ALBUMIN SERPL BCG-MCNC: 4.6 G/DL (ref 3.5–5.2)
ALP SERPL-CCNC: 84 U/L (ref 40–150)
ALT SERPL W P-5'-P-CCNC: 14 U/L (ref 0–70)
ANION GAP SERPL CALCULATED.3IONS-SCNC: 12 MMOL/L (ref 7–15)
AST SERPL W P-5'-P-CCNC: 23 U/L (ref 0–45)
BASOPHILS # BLD AUTO: 0 10E3/UL (ref 0–0.2)
BASOPHILS NFR BLD AUTO: 1 %
BILIRUB SERPL-MCNC: 0.6 MG/DL
BUN SERPL-MCNC: 15 MG/DL (ref 8–23)
CALCIUM SERPL-MCNC: 9.5 MG/DL (ref 8.8–10.2)
CHLORIDE SERPL-SCNC: 103 MMOL/L (ref 98–107)
CHOLEST SERPL-MCNC: 202 MG/DL
CREAT SERPL-MCNC: 0.82 MG/DL (ref 0.67–1.17)
DEPRECATED HCO3 PLAS-SCNC: 25 MMOL/L (ref 22–29)
EGFRCR SERPLBLD CKD-EPI 2021: >90 ML/MIN/1.73M2
EOSINOPHIL # BLD AUTO: 0.1 10E3/UL (ref 0–0.7)
EOSINOPHIL NFR BLD AUTO: 1 %
ERYTHROCYTE [DISTWIDTH] IN BLOOD BY AUTOMATED COUNT: 13 % (ref 10–15)
FASTING STATUS PATIENT QL REPORTED: YES
FASTING STATUS PATIENT QL REPORTED: YES
GLUCOSE SERPL-MCNC: 102 MG/DL (ref 70–99)
HBA1C MFR BLD: 5.5 % (ref 0–5.6)
HCT VFR BLD AUTO: 44.4 % (ref 40–53)
HDLC SERPL-MCNC: 50 MG/DL
HGB BLD-MCNC: 14.9 G/DL (ref 13.3–17.7)
IMM GRANULOCYTES # BLD: 0 10E3/UL
IMM GRANULOCYTES NFR BLD: 0 %
LDLC SERPL CALC-MCNC: 116 MG/DL
LYMPHOCYTES # BLD AUTO: 1.3 10E3/UL (ref 0.8–5.3)
LYMPHOCYTES NFR BLD AUTO: 20 %
MCH RBC QN AUTO: 30.2 PG (ref 26.5–33)
MCHC RBC AUTO-ENTMCNC: 33.6 G/DL (ref 31.5–36.5)
MCV RBC AUTO: 90 FL (ref 78–100)
MONOCYTES # BLD AUTO: 0.6 10E3/UL (ref 0–1.3)
MONOCYTES NFR BLD AUTO: 10 %
NEUTROPHILS # BLD AUTO: 4.6 10E3/UL (ref 1.6–8.3)
NEUTROPHILS NFR BLD AUTO: 69 %
NONHDLC SERPL-MCNC: 152 MG/DL
PLATELET # BLD AUTO: 215 10E3/UL (ref 150–450)
POTASSIUM SERPL-SCNC: 4.1 MMOL/L (ref 3.4–5.3)
PROT SERPL-MCNC: 7.2 G/DL (ref 6.4–8.3)
PSA SERPL DL<=0.01 NG/ML-MCNC: 0.71 NG/ML (ref 0–4.5)
RBC # BLD AUTO: 4.94 10E6/UL (ref 4.4–5.9)
SODIUM SERPL-SCNC: 140 MMOL/L (ref 135–145)
TRIGL SERPL-MCNC: 182 MG/DL
TSH SERPL DL<=0.005 MIU/L-ACNC: 2.35 UIU/ML (ref 0.3–4.2)
WBC # BLD AUTO: 6.6 10E3/UL (ref 4–11)

## 2024-05-09 PROCEDURE — 83036 HEMOGLOBIN GLYCOSYLATED A1C: CPT | Performed by: FAMILY MEDICINE

## 2024-05-09 PROCEDURE — 90471 IMMUNIZATION ADMIN: CPT | Performed by: FAMILY MEDICINE

## 2024-05-09 PROCEDURE — 99396 PREV VISIT EST AGE 40-64: CPT | Mod: 25 | Performed by: FAMILY MEDICINE

## 2024-05-09 PROCEDURE — 80061 LIPID PANEL: CPT | Performed by: FAMILY MEDICINE

## 2024-05-09 PROCEDURE — 85025 COMPLETE CBC W/AUTO DIFF WBC: CPT | Performed by: FAMILY MEDICINE

## 2024-05-09 PROCEDURE — 80053 COMPREHEN METABOLIC PANEL: CPT | Performed by: FAMILY MEDICINE

## 2024-05-09 PROCEDURE — 90472 IMMUNIZATION ADMIN EACH ADD: CPT | Performed by: FAMILY MEDICINE

## 2024-05-09 PROCEDURE — 36415 COLL VENOUS BLD VENIPUNCTURE: CPT | Performed by: FAMILY MEDICINE

## 2024-05-09 PROCEDURE — G0103 PSA SCREENING: HCPCS | Performed by: FAMILY MEDICINE

## 2024-05-09 PROCEDURE — 90750 HZV VACC RECOMBINANT IM: CPT | Performed by: FAMILY MEDICINE

## 2024-05-09 PROCEDURE — 90715 TDAP VACCINE 7 YRS/> IM: CPT | Performed by: FAMILY MEDICINE

## 2024-05-09 PROCEDURE — 84443 ASSAY THYROID STIM HORMONE: CPT | Performed by: FAMILY MEDICINE

## 2024-05-09 RX ORDER — ASPIRIN 81 MG/1
81 TABLET ORAL DAILY
COMMUNITY

## 2024-05-09 RX ORDER — AMLODIPINE BESYLATE 5 MG/1
5 TABLET ORAL DAILY
Qty: 90 TABLET | Refills: 3 | Status: SHIPPED | OUTPATIENT
Start: 2024-05-09

## 2024-05-09 RX ORDER — AMLODIPINE BESYLATE 5 MG/1
5 TABLET ORAL DAILY
Qty: 90 TABLET | Refills: 0 | OUTPATIENT
Start: 2024-05-09

## 2024-05-09 NOTE — PATIENT INSTRUCTIONS
"    We will send you lab  results  Stay on same medications  Increase activity/  exercise as able    Call / return to clinic with problems/ questions              Preventive Care Advice   This is general advice we often give to help people stay healthy. Your care team may have specific advice just for you. Please talk to your care team about your own preventive care needs.  Lifestyle  Exercise at least 150 minutes each week (30 minutes a day, 5 days a week).  Do muscle strengthening activities 2 days a week. These help control your weight and prevent disease.  No smoking.  Wear sunscreen to prevent skin cancer.  Have your home tested for radon every 2 to 5 years. Radon is a colorless, odorless gas that can harm your lungs. To learn more, go to www.health.[facility].[place].us and search for \"Radon in Homes.\"  Keep guns unloaded and locked up in a safe place like a safe or gun vault, or, use a gun lock and hide the keys. Always lock away bullets separately. To learn more, visit [employer].[place].gov and search for \"safe gun storage.\"  Nutrition  Eat 5 or more servings of fruits and vegetables each day.  Try wheat bread, brown rice and whole grain pasta (instead of white bread, rice, and pasta).  Get enough calcium and vitamin D. Check the label on foods and aim for 100% of the RDA (recommended daily allowance).  Regular exams  Have a dental exam and cleaning every 6 months.  See your health care team every year to talk about:  Any changes in your health.  Any medicines your care team has prescribed.  Preventive care, family planning, and ways to prevent chronic diseases.  Shots (vaccines)   HPV shots (up to age 26), if you've never had them before.  Hepatitis B shots (up to age 59), if you've never had them before.  COVID-19 shot: Get this shot when it's due.  Flu shot: Get a flu shot every year.  Tetanus shot: Get a tetanus shot every 10 years.  Pneumococcal, hepatitis A, and RSV shots: Ask your care team if you need these based on your " risk.  Shingles shot (for age 50 and up).  General health tests  Diabetes screening:  Starting at age 35, Get screened for diabetes at least every 3 years.  If you are younger than age 35, ask your care team if you should be screened for diabetes.  Cholesterol test: At age 39, start having a cholesterol test every 5 years, or more often if advised.  Bone density scan (DEXA): At age 50, ask your care team if you should have this scan for osteoporosis (brittle bones).  Hepatitis C: Get tested at least once in your life.  Abdominal aortic aneurysm screening: Talk to your doctor about having this screening if you:  Have ever smoked; and  Are biologically male; and  Are between the ages of 65 and 75.  STIs (sexually transmitted infections)  Before age 24: Ask your care team if you should be screened for STIs.  After age 24: Get screened for STIs if you're at risk. You are at risk for STIs (including HIV) if:  You are sexually active with more than one person.  You don't use condoms every time.  You or a partner was diagnosed with a sexually transmitted infection.  If you are at risk for HIV, ask about PrEP medicine to prevent HIV.  Get tested for HIV at least once in your life, whether you are at risk for HIV or not.  Cancer screening tests  Cervical cancer screening: If you have a cervix, begin getting regular cervical cancer screening tests at age 21. Most people who have regular screenings with normal results can stop after age 65. Talk about this with your provider.  Breast cancer scan (mammogram): If you've ever had breasts, begin having regular mammograms starting at age 40. This is a scan to check for breast cancer.  Colon cancer screening: It is important to start screening for colon cancer at age 45.  Have a colonoscopy test every 10 years (or more often if you're at risk) Or, ask your provider about stool tests like a FIT test every year or Cologuard test every 3 years.  To learn more about your testing  options, visit: www.fvfiles.com/651223.pdf.  For help making a decision, visit: ly/wn93964.  Prostate cancer screening test: If you have a prostate and are age 55 to 69, ask your provider if you would benefit from a yearly prostate cancer screening test.  Lung cancer screening: If you are a current or former smoker age 50 to 80, ask your care team if ongoing lung cancer screenings are right for you.  For informational purposes only. Not to replace the advice of your health care provider. Copyright   2023 [facility] [employer]. All rights reserved. Clinically reviewed by the [facility]. [employer] 319466 - REV 04/24.    Learning About Stress  What is stress?     Stress is your body's response to a hard situation. Your body can have a physical, emotional, or mental response. Stress is a fact of life for most people, and it affects everyone differently. What causes stress for you may not be stressful for someone else.  A lot of things can cause stress. You may feel stress when you go on a job interview, take a test, or run a race. This kind of short-term stress is normal and even useful. It can help you if you need to work hard or react quickly. For example, stress can help you finish an important job on time.  Long-term stress is caused by ongoing stressful situations or events. Examples of long-term stress include long-term health problems, ongoing problems at work, or conflicts in your family. Long-term stress can harm your health.  How does stress affect your health?  When you are stressed, your body responds as though you are in danger. It makes hormones that speed up your heart, make you breathe faster, and give you a burst of energy. This is called the fight-or-flight stress response. If the stress is over quickly, your body goes back to normal and no harm is done.  But if stress happens too often or lasts too long, it can have bad effects. Long-term stress can make you more  likely to get sick, and it can make symptoms of some diseases worse. If you tense up when you are stressed, you may develop neck, shoulder, or low back pain. Stress is linked to high blood pressure and heart disease.  Stress also harms your emotional health. It can make you oliveros, tense, or depressed. Your relationships may suffer, and you may not do well at work or school.  What can you do to manage stress?  You can try these things to help manage stress:   Do something active. Exercise or activity can help reduce stress. Walking is a great way to get started. Even everyday activities such as housecleaning or yard work can help.  Try yoga or madelyn chi. These techniques combine exercise and meditation. You may need some training at first to learn them.  Do something you enjoy. For example, listen to music or go to a movie. Practice your hobby or do volunteer work.  Meditate. This can help you relax, because you are not worrying about what happened before or what may happen in the future.  Do guided imagery. Imagine yourself in any setting that helps you feel calm. You can use online videos, books, or a teacher to guide you.  Do breathing exercises. For example:  From a standing position, bend forward from the waist with your knees slightly bent. Let your arms dangle close to the floor.  Breathe in slowly and deeply as you return to a standing position. Roll up slowly and lift your head last.  Hold your breath for just a few seconds in the standing position.  Breathe out slowly and bend forward from the waist.  Let your feelings out. Talk, laugh, cry, and express anger when you need to. Talking with supportive friends or family, a counselor, or a [place] leader about your feelings is a healthy way to relieve stress. Avoid discussing your feelings with people who make you feel worse.  Write. It may help to write about things that are bothering you. This helps you find out how much stress you feel and what is causing it.  "When you know this, you can find better ways to cope.  What can you do to prevent stress?  You might try some of these things to help prevent stress:  Manage your time. This helps you find time to do the things you want and need to do.  Get enough sleep. Your body recovers from the stresses of the day while you are sleeping.  Get support. Your family, friends, and community can make a difference in how you experience stress.  Limit your news feed. Avoid or limit time on social media or news that may make you feel stressed.  Do something active. Exercise or activity can help reduce stress. Walking is a great way to get started.  Where can you learn more?  Go to https://www.[employer].net/patiented  Enter N032 in the search box to learn more about \"Learning About Stress.\"  Current as of: October 24, 2023               Content Version: 14.0    6412-1185 [employer].   Care instructions adapted under license by your healthcare professional. If you have questions about a medical condition or this instruction, always ask your healthcare professional. [employer] disclaims any warranty or liability for your use of this information.      "

## 2024-05-09 NOTE — PROGRESS NOTES
Preventive Care Visit  [facility], Family Medicine  May 9, 2024          Dahlia Willoughby is a 64 year old, presenting for the following:  Physical and Health Maintenance        5/9/2024     7:01 AM   Additional Questions   Roomed by sara garzon        Health Care Directive  Patient does not have a Health Care Directive or Living Will:     HPI  Mostly done  with cane    Only occasionally when ice       5/7/2024   General Health   How would you rate your overall physical health? Good   Feel stress (tense, anxious, or unable to sleep) To some extent   (!) STRESS CONCERN      5/7/2024   Nutrition   Three or more servings of calcium each day? (!) I DON'T KNOW   Diet: I don't know   How many servings of fruit and vegetables per day? (!) 2-3   How many sweetened beverages each day? 0-1         5/7/2024   Exercise   Days per week of moderate/strenous exercise 7 days   Average minutes spent exercising at this level 20 min         5/7/2024   Social Factors   Frequency of gathering with friends or relatives More than three times a week   Worry food won't last until get money to buy more No   Food not last or not have enough money for food? No   Do you have housing?  Yes   Are you worried about losing your housing? No   Lack of transportation? No   Unable to get utilities (heat,electricity)? No         5/7/2024   Fall Risk   Fallen 2 or more times in the past year? No   Trouble with walking or balance? No          5/7/2024   Dental   Dentist two times every year? (!) NO         5/7/2024   TB Screening   Were you born outside of the [place]? No         Today's PHQ-2 Score:       5/8/2024     3:06 AM   PHQ-2 ( 1999 [employer])   Q1: Little interest or pleasure in doing things 0   Q2: Feeling down, depressed or hopeless 0   PHQ-2 Score 0   Q1: Little interest or pleasure in doing things Not at all   Q2: Feeling down, depressed or hopeless Not at all   PHQ-2 Score 0           5/7/2024   Substance  "Use   Alcohol more than 3/day or more than 7/wk No   Do you use any other substances recreationally? No     Social History     Tobacco Use    Smoking status: Never    Smokeless tobacco: Never   Vaping Use    Vaping status: Never Used   Substance Use Topics    Alcohol use: No     Comment: occasional    Drug use: No             5/7/2024   One time HIV Screening   Previous HIV test? No         5/7/2024   STI Screening   New sexual partner(s) since last STI/HIV test? No   Last PSA:   PSA   Date Value Ref Range Status   04/02/2021 0.44 0 - 4 ug/L Final     Comment:     Assay Method:  Chemiluminescence using Siemens Vista analyzer     Prostate Specific Antigen Screen   Date Value Ref Range Status   04/24/2023 0.51 0.00 - 4.00 ug/L Final     ASCVD Risk   The 10-year ASCVD risk score (Eulogio SÁNCHEZ, et al., 2019) is: 14.7%    Values used to calculate the score:      Age: 64 years      Sex: Male      Is Non- : No      Diabetic: No      Tobacco smoker: No      Systolic Blood Pressure: 136 mmHg      Is BP treated: Yes      HDL Cholesterol: 56 mg/dL      Total Cholesterol: 210 mg/dL           Reviewed and updated as needed this visit by Provider                    Tries to walk 15 minutes daily    Exercise bands    Pedal  thing used on  floor    Retired    No chest pain or breathing  problems    Sleep okay    About 8 hours of sleep    Normal blood pressure at home per patient          Objective    Exam  /88 (BP Location: Left arm, Patient Position: Chair, Cuff Size: Adult Regular)   Pulse 73   Temp 97.6  F (36.4  C) (Oral)   Resp 19   Ht 1.772 m (5' 9.75\")   Wt 83.9 kg (185 lb)   SpO2 98%   BMI 26.74 kg/m     Estimated body mass index is 26.74 kg/m  as calculated from the following:    Height as of this encounter: 1.772 m (5' 9.75\").    Weight as of this encounter: 83.9 kg (185 lb).    Physical Exam  GENERAL: alert and no distress  EYES: Eyes grossly normal to inspection, PERRL and " conjunctivae and sclerae normal  HENT: normal cephalic/atraumatic, nose and mouth without ulcers or lesions, oropharynx clear, oral mucous membranes moist, and left tympanic membrane/ canal   fine.  Right tympanic membrane not well seen  due to wax.   NECK: no adenopathy, no asymmetry, masses, or scars  RESP: lungs clear to auscultation - no rales, rhonchi or wheezes  CV: regular rate and rhythm, normal S1 S2, no S3 or S4, no murmur, click or rub, no peripheral edema  ABDOMEN: soft, nontender, no hepatosplenomegaly, no masses and bowel sounds normal  MS: no gross musculoskeletal defects noted, no edema  SKIN: no suspicious lesions or rashes  NEURO: Normal strength and tone, mentation intact and speech normal  PSYCH: mentation appears normal, affect normal/bright      1. Routine general medical examination at a health care facility    2. Screen for colon cancer    3. Encounter for immunization    4. Hyperlipidemia LDL goal <100    5. Screening for prostate cancer    6. Fatigue, unspecified type    7. Impaired fasting glucose    8. Hypertension goal BP (blood pressure) < 140/90      Overall patient stable  Keep working on healthy diet/exercise   Refill blood pressure med  Check labs  Chol mildly high last year, recheck     Signed Electronically by: Tushar Granados MD

## 2024-05-09 NOTE — NURSING NOTE
Prior to immunization administration, verified patients identity using patient s name and date of birth. Please see Immunization Activity for additional information.     Screening Questionnaire for Adult Immunization    Are you sick today?   No   Do you have allergies to medications, food, a vaccine component or latex?   No   Have you ever had a serious reaction after receiving a vaccination?   No   Do you have a long-term health problem with heart, lung, kidney, or metabolic disease (e.g., diabetes), asthma, a blood disorder, no spleen, complement component deficiency, a cochlear implant, or a spinal fluid leak?  Are you on long-term aspirin therapy?   No   Do you have cancer, leukemia, HIV/AIDS, or any other immune system problem?   No   Do you have a parent, brother, or sister with an immune system problem?   No   In the past 3 months, have you taken medications that affect  your immune system, such as prednisone, other steroids, or anticancer drugs; drugs for the treatment of rheumatoid arthritis, Crohn s disease, or psoriasis; or have you had radiation treatments?   No   Have you had a seizure, or a brain or other nervous system problem?   No   During the past year, have you received a transfusion of blood or blood    products, or been given immune (gamma) globulin or antiviral drug?   No   For women: Are you pregnant or is there a chance you could become       pregnant during the next month?   No   Have you received any vaccinations in the past 4 weeks?   No     Immunization questionnaire answers were all negative.      Patient instructed to remain in clinic for 15 minutes afterwards, and to report any adverse reactions.     Screening performed by Ratna Vasquez CMA on 5/9/2024 at 7:10 AM.

## 2024-05-10 NOTE — RESULT ENCOUNTER NOTE
Cholesterol mildly elevated, but better than last time.      Other labs are all fine.    Normal hemoglobin a1c means no diabetes or prediabetes.    Tushar Granados MD

## 2024-05-30 PROCEDURE — 82274 ASSAY TEST FOR BLOOD FECAL: CPT | Performed by: FAMILY MEDICINE

## 2024-06-06 LAB — HEMOCCULT STL QL IA: NEGATIVE

## 2024-09-25 ENCOUNTER — IMMUNIZATION (OUTPATIENT)
Dept: FAMILY MEDICINE | Facility: CLINIC | Age: 64
End: 2024-09-25
Payer: COMMERCIAL

## 2024-09-25 DIAGNOSIS — Z23 NEED FOR PROPHYLACTIC VACCINATION AND INOCULATION AGAINST INFLUENZA: Primary | ICD-10-CM

## 2024-09-25 PROCEDURE — 90471 IMMUNIZATION ADMIN: CPT

## 2024-09-25 PROCEDURE — 90673 RIV3 VACCINE NO PRESERV IM: CPT

## 2024-10-24 ENCOUNTER — IMMUNIZATION (OUTPATIENT)
Dept: FAMILY MEDICINE | Facility: CLINIC | Age: 64
End: 2024-10-24
Payer: COMMERCIAL

## 2024-10-24 DIAGNOSIS — Z23 HIGH PRIORITY FOR 2019-NCOV VACCINE: Primary | ICD-10-CM

## 2024-10-24 PROCEDURE — 91320 SARSCV2 VAC 30MCG TRS-SUC IM: CPT

## 2024-10-24 PROCEDURE — 90480 ADMN SARSCOV2 VAC 1/ONLY CMP: CPT

## 2025-02-05 NOTE — LETTER
5/3/2023         RE: Case Epstein  [address]  [place] [address]        Dear Colleague,    Thank you for referring your patient, Case Epstein, to the [facility]. Please see a copy of my visit note below.    Chief Complaint:   Chief Complaint   Patient presents with     Right Hip - Pain     Right hip osteoarthritis. Onset: 2020. He decided to have a right total knee arthroplasty before the hip, but has recovered and is now ready to discuss total hip arthroplasty.       HISTORY OF PRESENT ILLNESS    Case Epstein is a 63 year old male seen for evaluation of ongoing right hip pain with no known injury. Has known endstage right hip osteoarthritis.  Pain has been present since 8/2020. He notes one day he bent down to put on his sock on the right foot and couldn't bend all the way down. Locates pain to the groin area. Pain is worse with walking, moving. Ok at rest sitting, not bad at night, once in a while. Difficulties getting in/out of car, donning shoes/socks. Thinks a little shorter on the right compared to the left, but not for sure, seems to have improved since his right knee replacement. Takes Aleve or ibuprofen for pain. Left hip is hurting as well, not as bad.    Denies prior right hip problems until 8/2020. Denies low back pain, numbness and tingling.    He is status post right total knee arthroplasty 5/2021, doing well.    History left ankle fusion. Also some problems/pain with left shoulder.    Pain severity: 6/10  Pain quality: aching      Other PMH:  has a past medical history of High cholesterol and OA (OSTEOARTHRITIS) OF KNEE - right (9/27/2011).    He has no past medical history of Bleeding disorder (H), Cancer (H), Congestive heart failure, unspecified, COPD (chronic obstructive pulmonary disease) (H), Depressive disorder, Diabetes (H), Heart disease, History of blood transfusion, Hypertension, Inflammatory arthritis, Kidney disease, Stroke (H),  Freddie Wong presents today for   Chief Complaint   Patient presents with    Follow-up     6 months       Freddie Wong preferred language for health care discussion is english/other.    Is someone accompanying this pt? no    Is the patient using any DME equipment during OV? no    Depression Screening:  Depression: Not at risk (2/5/2025)    PHQ-2     PHQ-2 Score: 0        Learning Assessment:  Who is the primary learner? Patient    What is the preferred language for health care of the primary learner? ENGLISH    How does the primary learner prefer to learn new concepts? DEMONSTRATION    Answered By patient    Relationship to Learner SELF           Pt currently taking Anticoagulant therapy? no    Pt currently taking Antiplatelet therapy ? Aspirin  brilinita      Coordination of Care:  1. Have you been to the ER, urgent care clinic since your last visit? Hospitalized since your last visit? no    2. Have you seen or consulted any other health care providers outside of the [facility] since your last visit? Include any pap smears or colon screening. no     Surgical complications, Thyroid disease, Type II or unspecified type diabetes mellitus without mention of complication, not stated as uncontrolled, Uncomplicated asthma, or Unspecified asthma(493.90).  Patient Active Problem List   Diagnosis     OA (OSTEOARTHRITIS) OF KNEE - right     Vitamin D deficiency disease     Advanced directives, counseling/discussion     CARDIOVASCULAR SCREENING; LDL GOAL LESS THAN 100     Pain in joint, ankle and foot, left       Surgical Hx:  has a past surgical history that includes PALATE/UVULA SURGERY UNLISTED (age 2 and in 1986); colonoscopy; Arthrodesis ankle (Left, 11/20/2017); and orthopedic surgery (Right).    Medications:   Current Outpatient Medications:      acetaminophen (TYLENOL) 500 MG tablet, Take 500-1,000 mg by mouth, Disp: , Rfl:      amLODIPine (NORVASC) 5 MG tablet, Take 1 tablet (5 mg) by mouth daily, Disp: 30 tablet, Rfl: 2     ibuprofen (ADVIL/MOTRIN) 200 MG tablet, Take 200 mg by mouth every 4 hours as needed for mild pain, Disp: , Rfl:      naproxen sodium 220 MG capsule, Take 220 mg by mouth 2 times daily (with meals), Disp: , Rfl:     Allergies: No Known Allergies    Social Hx: retired.  reports that he has never smoked. He has never used smokeless tobacco. He reports that he does not drink alcohol and does not use drugs.    Family Hx: family history includes Arthritis in his maternal grandmother and mother; Cerebrovascular Disease in his maternal grandfather and maternal grandmother; Diabetes in his maternal grandmother; Eye Disorder in his brother and mother; Hearing Loss in his mother; Hypertension in his mother; Other - See Comments in his brother and father.    REVIEW OF SYSTEMS:  10 point ROS neg other than the symptoms noted above in the HPI and PAST MEDICAL HISTORY. Notables,  CONSTITUTIONAL:NEGATIVE for fever, chills, change in weight  INTEGUMENTARY/SKIN: NEGATIVE for worrisome rashes, moles or lesions  MUSCULOSKELETAL:See HPI above  NEURO: NEGATIVE for  "weakness, dizziness or paresthesias    PHYSICAL EXAM:  Ht 1.753 m (5' 9\")   Wt 79.4 kg (175 lb)   BMI 25.84 kg/m     GENERAL APPEARANCE: healthy, alert, no distress  SKIN: no suspicious lesions or rashes  NEURO: Normal strength and tone, mentation intact and speech normal  PSYCH:  mentation appears normal and affect normal  RESPIRATORY: No increased work of breathing.  LYMPH: no palpable inguinal lymph nodes.    BILATERAL LOWER EXTREMITIES:  Gait: antalgic favoring right , using a cane  Bilateral Quad atrophy, strength weak. Noted left calf atrophy.  Intact sensation deep peroneal nerve, superficial peroneal nerve, med/lat tibial nerve, sural nerve, saphenous nerve  Intact EHL, EDL, TA, FHL, GS, quadriceps hamstrings and hip flexors  Bilateral calf soft and nttp or squeeze.  Edema: trace  Leg lengths: right appears shorter than left at medial malleolus.    LEFT HIP EXAM:      Palpation: Tender:   none  Strength:  4/5  Special tests:  Irritability (flexion/adduction/internal rotation)  positive.  Hip range of motion: Internal rotation: 5, External rotation: 40, Flexion 90 with obligatory external rotation.      RIGHT HIP EXAM:    Palpation: Tender:   Right groin  Strength:  4/5  Special tests:  Irritability (flexion/adduction/internal rotation) positive   Hip range of motion: Internal rotation: lacks 5-10 degrees from neutral, External rotation: 20, Flexion 75 with obligatory external rotation, pain with extremes of rotation      X-RAY: AP pelvis and AP/Lateral views right hip from 5/3/2023 were reviewed in clinic today. No obvious fractures or dislocations.  Progression to severe right hip degenerative changes with loss of superior joint space with adjacent subchondral sclerosis of the acetabulum, subchondral acetabular cysts. There is been lateralization of the femoral head relative to the acetabulum, with flattening of the femoral head with femoral head sclerosis. Large marginal osteophytes. severe left hip " "degenerative changes with loss of superolateral joint space to bone on bone. Bilateral pincer deformity. Bilateral coxa valga.        Impression: 64yo male with chronic right hip pain, advanced \"end stage\" right hip primary osteoarthritis      Plan:     * discussed pain in groin/hip likely from advanced hip arthritis. This is wearing of the cartilage within the hip joint either due to normal \"wear and tear\" or following an injury. Any low back / buttock / radiating pain likely coming from the low back.  Left hip is not far behind the right. Both hips have really progress since 2021.  *  treatment options for hip arthritis and pain include: do nothing, NSAIDS, activity modification, Physical Therapy, injections, total hip arthroplasty. Risks and benefits of each discussed.   * did discuss patient is a candidate for total hip arthroplasty, and offered total hip arthroplasty to patient. Total hip arthroplasty will only attempt to provide pain relief from hip related arthritis only and not any pain from the low back. Any low back pain likely to continue.  *  Discussed risks of surgery include, but not limited to: bleeding, infection, pain, scar, damage to adjacent structures (e.g. Nerves, blood vessels, bone, cartilage), temporary or permanent nerve damage, recurrence of symptoms, implant dislocation, implant failure, implant infection, unequal limb lengths, stiffness, need for further surgery, blood clots, pulmonary embolism, risks of anesthesia, and death.    * understanding the risks of surgery, as a quality of life decision, patient would like to proceed with surgery: RIGHT total hip arthroplasty.  * will arrange RIGHT total hip arthroplasty at a mutual convenience in the near future    * discussed patient and their  will plan hospitalization overnight with discharge home the next morning, daily physical therapy starting either the day of or day after surgery.  * will plan postoperative deep vein thrombosis " prophylaxis x4 weeks.  Additionally, graduated compression stockings x4 weeks, and SCDs while in the hospital.  * postoperative pain control will be oral medications upon discharge from hospital  * postoperative Physical Therapy will be discussed, if needed, at first postoperative visit at 2 weeks  * patient will need longterm prophylactic antibiotics use with dental procedures or other invasive procedures (2 g amoxicilin or 450mg ([id number]) clindamycin) 1 hour prior to dental procedures.  * patient will need preoperative H+P prior to surgery from PCP.  * will see patient 2 weeks postoperative, sooner as needed. Will obtain lowset AP pelvis and lateral right hip xray at that time.      * All questions were addressed and answered prior to discharge from clinic today. The patient acknowledges an understanding of and agreement with the plan set forth during today's visit. Patient was advised to call our office or MyChart us if any further questions arise upon leaving our office today.        Dennis Randle M.D., M.S.  Dept. of Orthopaedic Surgery  [facility]              Again, thank you for allowing me to participate in the care of your patient.        Sincerely,        Dennis Randle MD

## 2025-04-09 ENCOUNTER — PATIENT OUTREACH (OUTPATIENT)
Dept: CARE COORDINATION | Facility: CLINIC | Age: 65
End: 2025-04-09
Payer: COMMERCIAL

## 2025-04-23 ENCOUNTER — PATIENT OUTREACH (OUTPATIENT)
Dept: CARE COORDINATION | Facility: CLINIC | Age: 65
End: 2025-04-23
Payer: COMMERCIAL

## 2025-06-04 PROBLEM — Z13.6 CARDIOVASCULAR SCREENING; LDL GOAL LESS THAN 100: Status: RESOLVED | Noted: 2017-08-16 | Resolved: 2025-06-04

## 2025-06-04 PROBLEM — M25.572 PAIN IN JOINT, ANKLE AND FOOT, LEFT: Status: RESOLVED | Noted: 2018-01-08 | Resolved: 2025-06-04

## 2025-06-07 SDOH — HEALTH STABILITY: PHYSICAL HEALTH: ON AVERAGE, HOW MANY DAYS PER WEEK DO YOU ENGAGE IN MODERATE TO STRENUOUS EXERCISE (LIKE A BRISK WALK)?: 4 DAYS

## 2025-06-07 SDOH — HEALTH STABILITY: PHYSICAL HEALTH: ON AVERAGE, HOW MANY MINUTES DO YOU ENGAGE IN EXERCISE AT THIS LEVEL?: 20 MIN

## 2025-06-07 ASSESSMENT — SOCIAL DETERMINANTS OF HEALTH (SDOH): HOW OFTEN DO YOU GET TOGETHER WITH FRIENDS OR RELATIVES?: MORE THAN THREE TIMES A WEEK

## 2025-06-12 ENCOUNTER — OFFICE VISIT (OUTPATIENT)
Dept: FAMILY MEDICINE | Facility: CLINIC | Age: 65
End: 2025-06-12
Payer: COMMERCIAL

## 2025-06-12 ENCOUNTER — RESULTS FOLLOW-UP (OUTPATIENT)
Dept: FAMILY MEDICINE | Facility: CLINIC | Age: 65
End: 2025-06-12

## 2025-06-12 VITALS
BODY MASS INDEX: 26.25 KG/M2 | DIASTOLIC BLOOD PRESSURE: 88 MMHG | WEIGHT: 183.38 LBS | SYSTOLIC BLOOD PRESSURE: 138 MMHG | TEMPERATURE: 97.2 F | RESPIRATION RATE: 14 BRPM | HEIGHT: 70 IN | HEART RATE: 71 BPM | OXYGEN SATURATION: 98 %

## 2025-06-12 DIAGNOSIS — Z12.5 SCREENING FOR PROSTATE CANCER: ICD-10-CM

## 2025-06-12 DIAGNOSIS — Z23 ENCOUNTER FOR IMMUNIZATION: ICD-10-CM

## 2025-06-12 DIAGNOSIS — R73.01 IMPAIRED FASTING GLUCOSE: ICD-10-CM

## 2025-06-12 DIAGNOSIS — I10 PRIMARY HYPERTENSION: ICD-10-CM

## 2025-06-12 DIAGNOSIS — Z12.11 SCREEN FOR COLON CANCER: ICD-10-CM

## 2025-06-12 DIAGNOSIS — E78.5 HYPERLIPIDEMIA LDL GOAL <100: ICD-10-CM

## 2025-06-12 DIAGNOSIS — R53.83 FATIGUE, UNSPECIFIED TYPE: ICD-10-CM

## 2025-06-12 DIAGNOSIS — I10 HYPERTENSION GOAL BP (BLOOD PRESSURE) < 140/90: ICD-10-CM

## 2025-06-12 DIAGNOSIS — Z00.00 ENCOUNTER FOR MEDICARE ANNUAL WELLNESS EXAM: Primary | ICD-10-CM

## 2025-06-12 LAB
ALBUMIN SERPL BCG-MCNC: 4.4 G/DL (ref 3.5–5.2)
ALP SERPL-CCNC: 75 U/L (ref 40–150)
ALT SERPL W P-5'-P-CCNC: 15 U/L (ref 0–70)
ANION GAP SERPL CALCULATED.3IONS-SCNC: 12 MMOL/L (ref 7–15)
AST SERPL W P-5'-P-CCNC: 24 U/L (ref 0–45)
BASOPHILS # BLD AUTO: 0 10E3/UL (ref 0–0.2)
BASOPHILS NFR BLD AUTO: 1 %
BILIRUB SERPL-MCNC: 0.3 MG/DL
BUN SERPL-MCNC: 14.1 MG/DL (ref 8–23)
CALCIUM SERPL-MCNC: 9.9 MG/DL (ref 8.8–10.4)
CHLORIDE SERPL-SCNC: 104 MMOL/L (ref 98–107)
CHOLEST SERPL-MCNC: 208 MG/DL
CREAT SERPL-MCNC: 0.92 MG/DL (ref 0.67–1.17)
EGFRCR SERPLBLD CKD-EPI 2021: >90 ML/MIN/1.73M2
EOSINOPHIL # BLD AUTO: 0.1 10E3/UL (ref 0–0.7)
EOSINOPHIL NFR BLD AUTO: 1 %
ERYTHROCYTE [DISTWIDTH] IN BLOOD BY AUTOMATED COUNT: 13.1 % (ref 10–15)
EST. AVERAGE GLUCOSE BLD GHB EST-MCNC: 108 MG/DL
FASTING STATUS PATIENT QL REPORTED: YES
FASTING STATUS PATIENT QL REPORTED: YES
GLUCOSE SERPL-MCNC: 105 MG/DL (ref 70–99)
HBA1C MFR BLD: 5.4 % (ref 0–5.6)
HCO3 SERPL-SCNC: 24 MMOL/L (ref 22–29)
HCT VFR BLD AUTO: 47.3 % (ref 40–53)
HDLC SERPL-MCNC: 48 MG/DL
HGB BLD-MCNC: 15.3 G/DL (ref 13.3–17.7)
IMM GRANULOCYTES # BLD: 0 10E3/UL
IMM GRANULOCYTES NFR BLD: 0 %
LDLC SERPL CALC-MCNC: 129 MG/DL
LYMPHOCYTES # BLD AUTO: 1.6 10E3/UL (ref 0.8–5.3)
LYMPHOCYTES NFR BLD AUTO: 26 %
MCH RBC QN AUTO: 29.1 PG (ref 26.5–33)
MCHC RBC AUTO-ENTMCNC: 32.3 G/DL (ref 31.5–36.5)
MCV RBC AUTO: 90 FL (ref 78–100)
MONOCYTES # BLD AUTO: 0.6 10E3/UL (ref 0–1.3)
MONOCYTES NFR BLD AUTO: 10 %
NEUTROPHILS # BLD AUTO: 4 10E3/UL (ref 1.6–8.3)
NEUTROPHILS NFR BLD AUTO: 62 %
NONHDLC SERPL-MCNC: 160 MG/DL
PLATELET # BLD AUTO: 180 10E3/UL (ref 150–450)
POTASSIUM SERPL-SCNC: 4.5 MMOL/L (ref 3.4–5.3)
PROT SERPL-MCNC: 6.9 G/DL (ref 6.4–8.3)
PSA SERPL DL<=0.01 NG/ML-MCNC: 0.55 NG/ML (ref 0–4.5)
RBC # BLD AUTO: 5.25 10E6/UL (ref 4.4–5.9)
SODIUM SERPL-SCNC: 140 MMOL/L (ref 135–145)
TRIGL SERPL-MCNC: 154 MG/DL
TSH SERPL DL<=0.005 MIU/L-ACNC: 2.64 UIU/ML (ref 0.3–4.2)
WBC # BLD AUTO: 6.3 10E3/UL (ref 4–11)

## 2025-06-12 RX ORDER — AMLODIPINE BESYLATE 5 MG/1
5 TABLET ORAL DAILY
Qty: 90 TABLET | Refills: 3 | Status: SHIPPED | OUTPATIENT
Start: 2025-06-12

## 2025-06-12 ASSESSMENT — PAIN SCALES - GENERAL: PAINLEVEL_OUTOF10: NO PAIN (0)

## 2025-06-12 NOTE — PATIENT INSTRUCTIONS
We will send you lab results    Keep working on healthy diet/exercise    Stay on same blood pressure medication    Could get 2nd shingles shot at pharmacy     Return the FIT stool test               Patient Education   Preventive Care Advice   This is general advice given by our system to help you stay healthy. However, your care team may have specific advice just for you. Please talk to your care team about your preventive care needs.  Nutrition  Eat 5 or more servings of fruits and vegetables each day.  Try wheat bread, brown rice and whole grain pasta (instead of white bread, rice, and pasta).  Get enough calcium and vitamin D. Check the label on foods and aim for 100% of the RDA (recommended daily allowance).  Lifestyle  Exercise at least 150 minutes each week  (30 minutes a day, 5 days a week).  Do muscle strengthening activities 2 days a week. These help control your weight and prevent disease.  No smoking.  Wear sunscreen to prevent skin cancer.  Have a dental exam and cleaning every 6 months.  Yearly exams  See your health care team every year to talk about:  Any changes in your health.  Any medicines your care team has prescribed.  Preventive care, family planning, and ways to prevent chronic diseases.  Shots (vaccines)   HPV shots (up to age 26), if you've never had them before.  Hepatitis B shots (up to age 59), if you've never had them before.  COVID-19 shot: Get this shot when it's due.  Flu shot: Get a flu shot every year.  Tetanus shot: Get a tetanus shot every 10 years.  Pneumococcal, hepatitis A, and RSV shots: Ask your care team if you need these based on your risk.  Shingles shot (for age 50 and up)  General health tests  Diabetes screening:  Starting at age 35, Get screened for diabetes at least every 3 years.  If you are younger than age 35, ask your care team if you should be screened for diabetes.  Cholesterol test: At age 39, start having a cholesterol test every 5 years, or more often if  advised.  Bone density scan (DEXA): At age 50, ask your care team if you should have this scan for osteoporosis (brittle bones).  Hepatitis C: Get tested at least once in your life.  STIs (sexually transmitted infections)  Before age 24: Ask your care team if you should be screened for STIs.  After age 24: Get screened for STIs if you're at risk. You are at risk for STIs (including HIV) if:  You are sexually active with more than one person.  You don't use condoms every time.  You or a partner was diagnosed with a sexually transmitted infection.  If you are at risk for HIV, ask about PrEP medicine to prevent HIV.  Get tested for HIV at least once in your life, whether you are at risk for HIV or not.  Cancer screening tests  Cervical cancer screening: If you have a cervix, begin getting regular cervical cancer screening tests starting at age 21.  Breast cancer scan (mammogram): If you've ever had breasts, begin having regular mammograms starting at age 40. This is a scan to check for breast cancer.  Colon cancer screening: It is important to start screening for colon cancer at age 45.  Have a colonoscopy test every 10 years (or more often if you're at risk) Or, ask your provider about stool tests like a FIT test every year or Cologuard test every 3 years.  To learn more about your testing options, visit:   .  For help making a decision, visit:   https://bit.ly/ln06962.  Prostate cancer screening test: If you have a prostate, ask your care team if a prostate cancer screening test (PSA) at age 55 is right for you.  Lung cancer screening: If you are a current or former smoker ages 50 to 80, ask your care team if ongoing lung cancer screenings are right for you.  For informational purposes only. Not to replace the advice of your health care provider. Copyright   2023 [employer]. All rights reserved. Clinically reviewed by the [facility]. [employer] 141310 - REV 01/24.  Preventing  Falls: Care Instructions  Injuries and health problems such as trouble walking or poor eyesight can increase your risk of falling. So can some medicines. But there are things you can do to help prevent falls. You can exercise to get stronger. You can also arrange your home to make it safer.    Talk to your doctor about the medicines you take. Ask if any of them increase the risk of falls and whether they can be changed or stopped.   Try to exercise regularly. It can help improve your strength and balance. This can help lower your risk of falling.         Practice fall safety and prevention.   Wear low-heeled shoes that fit well and give your feet good support. Talk to your doctor if you have foot problems that make this hard.  Carry a cellphone or wear a medical alert device that you can use to call for help.  Use stepladders instead of chairs to reach high objects. Don't climb if you're at risk for falls. Ask for help, if needed.  Wear the correct eyeglasses, if you need them.        Make your home safer.   Remove rugs, cords, clutter, and furniture from walkways.  Keep your house well lit. Use night-lights in hallways and bathrooms.  Install and use sturdy handrails on stairways.  Wear nonskid footwear, even inside. Don't walk barefoot or in socks without shoes.        Be safe outside.   Use handrails, curb cuts, and ramps whenever possible.  Keep your hands free by using a shoulder bag or backpack.  Try to walk in well-lit areas. Watch out for uneven ground, changes in pavement, and debris.  Be careful in the winter. Walk on the grass or gravel when sidewalks are slippery. Use de-icer on steps and walkways. Add non-slip devices to shoes.    Put grab bars and nonskid mats in your shower or tub and near the toilet. Try to use a shower chair or bath bench when bathing.   Get into a tub or shower by putting in your weaker leg first. Get out with your strong side first. Have a phone or medical alert device in the  "bathroom with you.   Where can you learn more?  Go to https://www.[employer].net/patiented  Enter G117 in the search box to learn more about \"Preventing Falls: Care Instructions.\"  Current as of: July 31, 2024  Content Version: 14.5    8916-8795 [employer].   Care instructions adapted under license by your healthcare professional. If you have questions about a medical condition or this instruction, always ask your healthcare professional. [employer] disclaims any warranty or liability for your use of this information.    Hearing Loss: Care Instructions  Overview     Hearing loss is a sudden or slow decrease in how well you hear. It can range from slight to profound. Permanent hearing loss can occur with aging. It also can happen when you are exposed long-term to loud noise. Examples include listening to loud music, riding motorcycles, or being around other loud machines.  Hearing loss can affect your work and home life. It can make you feel lonely or depressed. You may feel that you have lost your independence. But hearing aids and other devices can help you hear better and feel connected to others.  Follow-up care is a key part of your treatment and safety. Be sure to make and go to all appointments, and call your doctor if you are having problems. It's also a good idea to know your test results and keep a list of the medicines you take.  How can you care for yourself at home?  Avoid loud noises whenever possible. This helps keep your hearing from getting worse.  Always wear hearing protection around loud noises.  Wear a hearing aid as directed.  A professional can help you pick a hearing aid that will work best for you.  You can also get hearing aids over the counter for mild to moderate hearing loss.  Have hearing tests as your doctor suggests. They can show whether your hearing has changed. Your hearing aid may need to be adjusted.  Use other devices as needed. These may " "include:  Telephone amplifiers and hearing aids that can connect to a television, stereo, radio, or microphone.  Devices that use lights or vibrations. These alert you to the doorbell, a ringing telephone, or a baby monitor.  Television closed-captioning. This shows the words at the bottom of the screen. Most new TVs can do this.  TTY (text telephone). This lets you type messages back and forth on the telephone instead of talking or listening. These devices are also called TDD. When messages are typed on the keyboard, they are sent over the phone line to a receiving TTY. The message is shown on a monitor.  Use text messaging, social media, and email if it is hard for you to communicate by telephone.  Try to learn a listening technique called speechreading. It is not lipreading. You pay attention to people's gestures, expressions, posture, and tone of voice. These clues can help you understand what a person is saying. Face the person you are talking to, and have them face you. Make sure the lighting is good. You need to see the other person's face clearly.  Think about counseling if you need help to adjust to your hearing loss.  When should you call for help?  Watch closely for changes in your health, and be sure to contact your doctor if:    You think your hearing is getting worse.     You have new symptoms, such as dizziness or nausea.   Where can you learn more?  Go to https://www.[employer].net/patiented  Enter R798 in the search box to learn more about \"Hearing Loss: Care Instructions.\"  Current as of: October 27, 2024  Content Version: 14.5    3500-4957 [employer].   Care instructions adapted under license by your healthcare professional. If you have questions about a medical condition or this instruction, always ask your healthcare professional. [employer] disclaims any warranty or liability for your use of this information.    Learning About Stress  What is stress?     Stress is your " body's response to a hard situation. Your body can have a physical, emotional, or mental response. Stress is a fact of life for most people, and it affects everyone differently. What causes stress for you may not be stressful for someone else.  A lot of things can cause stress. You may feel stress when you go on a job interview, take a test, or run a race. This kind of short-term stress is normal and even useful. It can help you if you need to work hard or react quickly. For example, stress can help you finish an important job on time.  Long-term stress is caused by ongoing stressful situations or events. Examples of long-term stress include long-term health problems, ongoing problems at work, or conflicts in your family. Long-term stress can harm your health.  How does stress affect your health?  When you are stressed, your body responds as though you are in danger. It makes hormones that speed up your heart, make you breathe faster, and give you a burst of energy. This is called the fight-or-flight stress response. If the stress is over quickly, your body goes back to normal and no harm is done.  But if stress happens too often or lasts too long, it can have bad effects. Long-term stress can make you more likely to get sick, and it can make symptoms of some diseases worse. If you tense up when you are stressed, you may develop neck, shoulder, or low back pain. Stress is linked to high blood pressure and heart disease.  Stress also harms your emotional health. It can make you oliveros, tense, or depressed. Your relationships may suffer, and you may not do well at work or school.  What can you do to manage stress?  You can try these things to help manage stress:   Do something active. Exercise or activity can help reduce stress. Walking is a great way to get started. Even everyday activities such as housecleaning or yard work can help.  Try yoga or madelyn chi. These techniques combine exercise and meditation. You may need  some training at first to learn them.  Do something you enjoy. For example, listen to music or go to a movie. Practice your hobby or do volunteer work.  Meditate. This can help you relax, because you are not worrying about what happened before or what may happen in the future.  Do guided imagery. Imagine yourself in any setting that helps you feel calm. You can use online videos, books, or a teacher to guide you.  Do breathing exercises. For example:  From a standing position, bend forward from the waist with your knees slightly bent. Let your arms dangle close to the floor.  Breathe in slowly and deeply as you return to a standing position. Roll up slowly and lift your head last.  Hold your breath for just a few seconds in the standing position.  Breathe out slowly and bend forward from the waist.  Let your feelings out. Talk, laugh, cry, and express anger when you need to. Talking with supportive friends or family, a counselor, or a florence leader about your feelings is a healthy way to relieve stress. Avoid discussing your feelings with people who make you feel worse.  Write. It may help to write about things that are bothering you. This helps you find out how much stress you feel and what is causing it. When you know this, you can find better ways to cope.  What can you do to prevent stress?  You might try some of these things to help prevent stress:  Manage your time. This helps you find time to do the things you want and need to do.  Get enough sleep. Your body recovers from the stresses of the day while you are sleeping.  Get support. Your family, friends, and community can make a difference in how you experience stress.  Limit your news feed. Avoid or limit time on social media or news that may make you feel stressed.  Do something active. Exercise or activity can help reduce stress. Walking is a great way to get started.  Where can you learn more?  Go to https://www.healthwise.net/patiented  Enter N032 in the  "search box to learn more about \"Learning About Stress.\"  Current as of: October 24, 2024  Content Version: 14.5 2024-2025 [employer].   Care instructions adapted under license by your healthcare professional. If you have questions about a medical condition or this instruction, always ask your healthcare professional. [employer] disclaims any warranty or liability for your use of this information.       "

## 2025-06-12 NOTE — PROGRESS NOTES
"Preventive Care Visit  [facility], [facility] Medicine  Jun 12, 2025      Assessment & Plan     (Z00.00) Encounter for Medicare annual wellness exam  (primary encounter diagnosis)  Comment: overall patient stable    Plan: keep working on healthy diet/exercise     (I10) Primary hypertension  Comment: on recheck the blood pressures between right and left arms are similar.  Fairly good control and acceptable readings at home  Plan: no change in med     (Z12.11) Screen for colon cancer  Comment: due for annual fit test   Plan: Fecal colorectal cancer screen FIT - Future         (S+30)             (Z23) Encounter for immunization  Comment: given here.    Plan: Pneumococcal 20 Valent Conjugate (PCV20),         SCREENING QUESTIONS FOR ADULT IMMUNIZATIONS         Also he could get 2nd shingles shot at pharmacy     (E78.5) Hyperlipidemia LDL goal <100  Comment: check labs   Plan: Lipid panel reflex to direct LDL Fasting,         Comprehensive metabolic panel             (R73.01) Impaired fasting glucose  Comment: check   Plan: Hemoglobin A1c             (Z12.5) Screening for prostate cancer  Comment: psa   Plan: Prostate Specific Antigen Screen             (R53.83) Fatigue, unspecified type  Comment: check   Plan: CBC with Platelets & Differential, TSH with         free T4 reflex             (I10) Hypertension goal BP (blood pressure) < 140/90  Comment: refill med   Plan: amLODIPine (NORVASC) 5 MG tablet                     BMI  Estimated body mass index is 26.5 kg/m  as calculated from the following:    Height as of this encounter: 1.772 m (5' 9.75\").    Weight as of this encounter: 83.2 kg (183 lb 6 oz).   Weight management plan: Discussed healthy diet and exercise guidelines    Counseling  Appropriate preventive services were addressed with this patient via screening, questionnaire, or discussion as appropriate for fall prevention, nutrition, physical activity, Tobacco-use " cessation, social engagement, weight loss and cognition.  Checklist reviewing preventive services available has been given to the patient.  Reviewed patient's diet, addressing concerns and/or questions.   The patient was instructed to see the dentist every 6 months.   He is at risk for psychosocial distress and has been provided with information to reduce risk.   The patient was provided with written information regarding signs of hearing loss.           Dahlia Willoughby is a 65 year old, presenting for the following:  Wellness Visit (Fasting)        6/12/2025     6:42 AM   Additional Questions   Roomed by Zoe MANDUJANO  Feeling well    No concerns    A little numbness right 4th and 5th fingers    No weakness    Stretch bands    Walk daily for 15-20 min    Hips good    Worried about mom    Urinary stream okay     Has girlfriend    No std concern    Sees eye doctor    Systolic at home usually 130s or less        Advance Care Planning    Discussed advance care planning with patient; however, patient declined at this time.        6/7/2025   General Health   How would you rate your overall physical health? Good   Feel stress (tense, anxious, or unable to sleep) To some extent   (!) STRESS CONCERN      6/7/2025   Nutrition   Diet: Regular (no restrictions)         6/7/2025   Exercise   Days per week of moderate/strenous exercise 4 days   Average minutes spent exercising at this level 20 min         6/7/2025   Social Factors   Frequency of gathering with friends or relatives More than three times a week   Worry food won't last until get money to buy more No   Food not last or not have enough money for food? No   Do you have housing? (Housing is defined as stable permanent housing and does not include staying outside in a car, in a tent, in an abandoned building, in an overnight shelter, or couch-surfing.) Yes   Are you worried about losing your housing? No   Lack of transportation? No   Unable to get  utilities (heat,electricity)? No         6/12/2025   Fall Risk   Gait Speed Test Interpretation Less than or equal to 5.00 seconds - PASS    Less than or equal to 5.00 seconds - PASS       Multiple values from one day are sorted in reverse-chronological order          6/7/2025   Activities of Daily Living- Home Safety   Needs help with the following daily activites None of the above   Safety concerns in the home None of the above         6/7/2025   Dental   Dentist two times every year? (!) NO         6/7/2025   Hearing Screening   Hearing concerns? (!) IT'S HARD TO FOLLOW A CONVERSATION IN A NOISY RESTAURANT OR CROWDED ROOM.         6/7/2025   Driving Risk Screening   Patient/family members have concerns about driving No         6/7/2025   General Alertness/Fatigue Screening   Have you been more tired than usual lately? No         6/7/2025   Urinary Incontinence Screening   Bothered by leaking urine in past 6 months No         Today's PHQ-2 Score:       6/11/2025     9:39 AM   PHQ-2 ( 1999 Pfizer)   Q1: Little interest or pleasure in doing things 0   Q2: Feeling down, depressed or hopeless 2   PHQ-2 Score 2    Q1: Little interest or pleasure in doing things Not at all   Q2: Feeling down, depressed or hopeless More than half the days   PHQ-2 Score 2       Patient-reported           6/7/2025   Substance Use   Alcohol more than 3/day or more than 7/wk No   Do you have a current opioid prescription? No   How severe/bad is pain from 1 to 10? 0/10 (No Pain)   Do you use any other substances recreationally? No     Social History     Tobacco Use    Smoking status: Never     Passive exposure: Never    Smokeless tobacco: Never   Vaping Use    Vaping status: Never Used   Substance Use Topics    Alcohol use: No     Comment: occasional    Drug use: No           6/7/2025   AAA Screening   Family history of Abdominal Aortic Aneurysm (AAA)? No         6/7/2025   One time HIV Screening   Previous HIV test? No   Last PSA:   PSA    Date Value Ref Range Status   04/02/2021 0.44 0 - 4 ug/L Final     Comment:     Assay Method:  Chemiluminescence using Siemens Vista analyzer     Prostate Specific Antigen Screen   Date Value Ref Range Status   05/09/2024 0.71 0.00 - 4.50 ng/mL Final   04/24/2023 0.51 0.00 - 4.00 ug/L Final     ASCVD Risk   The 10-year ASCVD risk score (Eulogio SÁNCHEZ, et al., 2019) is: 20.3%    Values used to calculate the score:      Age: 65 years      Sex: Male      Is Non- : No      Diabetic: No      Tobacco smoker: No      Systolic Blood Pressure: 155 mmHg      Is BP treated: Yes      HDL Cholesterol: 50 mg/dL      Total Cholesterol: 202 mg/dL            Reviewed and updated as needed this visit by Provider                      Current providers sharing in care for this patient include:  Patient Care Team:  Tushar Granados MD as PCP - General  Tushar Granados MD as Assigned PCP    The following health maintenance items are reviewed in Epic and correct as of today:  Health Maintenance   Topic Date Due    ANNUAL REVIEW OF HM ORDERS  Never done    HIV SCREENING  Never done    PNEUMOCOCCAL VACCINE 50+ YEARS (1 of 1 - PCV) Never done    COVID-19 VACCINE (8 - 2024-25 season) 05/01/2025    MEDICARE ANNUAL WELLNESS VISIT  05/09/2025    BMP  05/09/2025    LIPID  05/09/2025    COLORECTAL CANCER SCREENING  05/30/2025    ZOSTER VACCINE (2 of 2) 07/04/2024    FALL RISK ASSESSMENT  06/12/2026    DIABETES SCREENING  05/09/2027    ADVANCE CARE PLANNING  04/24/2028    DTAP/TDAP/TD VACCINE (3 - Td or Tdap) 05/09/2034    RSV VACCINE (1 - 1-dose 75+ series) 05/01/2035    HEPATITIS C SCREENING  Completed    PHQ-2 (once per calendar year)  Completed    INFLUENZA VACCINE  Completed    HPV VACCINE  Aged Out    MENINGITIS VACCINE  Aged Out            Objective    Exam  BP (!) 155/86 (BP Location: Left arm, Patient Position: Chair, Cuff Size: Adult Regular)   Pulse 71   Temp 97.2  F (36.2  C) (Temporal)   Resp 14    "Ht 1.772 m (5' 9.75\")   Wt 83.2 kg (183 lb 6 oz)   SpO2 98%   BMI 26.50 kg/m     Estimated body mass index is 26.5 kg/m  as calculated from the following:    Height as of this encounter: 1.772 m (5' 9.75\").    Weight as of this encounter: 83.2 kg (183 lb 6 oz).    Physical Exam  GENERAL: alert and no distress  EYES: Eyes grossly normal to inspection, PERRL and conjunctivae and sclerae normal  HENT: ear canals and TM's normal, nose and mouth without ulcers or lesions  NECK: no adenopathy, no asymmetry, masses, or scars  RESP: lungs clear to auscultation - no rales, rhonchi or wheezes  CV: regular rate and rhythm, normal S1 S2, no S3 or S4, no murmur, click or rub, no peripheral edema  ABDOMEN: soft, nontender, no hepatosplenomegaly, no masses and bowel sounds normal  MS: patient has thickening of several fingernails but this is not new.  He cannot fully extend right 5th finger but sensation and strength are good throughout all digits. Good symmetric radial pulses   SKIN: no suspicious lesions or rashes  NEURO: Normal strength and tone, mentation intact and speech normal  PSYCH: mentation appears normal, affect normal/bright        6/12/2025   Mini Cog   Clock Draw Score 2 Normal   3 Item Recall 3 objects recalled   Mini Cog Total Score 5         Vision Screen  Reason Vision Screen Not Completed:  (patient had an eye exam within the past year)  Vision Screen Results: Pass       Signed Electronically by: Tushar Granados MD    "

## 2025-06-30 ENCOUNTER — PATIENT OUTREACH (OUTPATIENT)
Dept: CARE COORDINATION | Facility: CLINIC | Age: 65
End: 2025-06-30
Payer: COMMERCIAL

## 2025-07-08 LAB — HEMOCCULT STL QL IA: NEGATIVE

## 2025-08-04 DIAGNOSIS — I10 HYPERTENSION GOAL BP (BLOOD PRESSURE) < 140/90: ICD-10-CM

## 2025-08-04 RX ORDER — AMLODIPINE BESYLATE 5 MG/1
5 TABLET ORAL DAILY
Qty: 90 TABLET | Refills: 2 | Status: SHIPPED | OUTPATIENT
Start: 2025-08-04

## (undated) DEVICE — BLADE SAW OSCILLATING STRYK MED 9.0X25X0.38MM 2296-003-111

## (undated) DEVICE — GOWN XXLG REINFORCED 9071EL

## (undated) DEVICE — SUCTION TIP YANKAUER STR K87

## (undated) DEVICE — KIT PATIENT CARE HANA TABLE PROFX SUPINE 6855

## (undated) DEVICE — DRAPE STERI U 1015

## (undated) DEVICE — SOL NACL 0.9% IRRIG 1000ML BOTTLE 07138-09

## (undated) DEVICE — GLOVE BIOGEL PI MICRO SZ 7.5 48575

## (undated) DEVICE — IMPLANTABLE DEVICE
Type: IMPLANTABLE DEVICE | Site: HIP | Status: NON-FUNCTIONAL
Removed: 2023-05-30

## (undated) DEVICE — Device

## (undated) DEVICE — PACK TOTAL HIP W/POUCH RIVERSIDE LATEX FREE

## (undated) DEVICE — STOCKING SLEEVE COMPRESSION CALF MED

## (undated) DEVICE — DRAPE U-POUCH 34X29" 1067

## (undated) DEVICE — SOL WATER IRRIG 1000ML BOTTLE 07139-09

## (undated) DEVICE — GLOVE PROTEXIS MICRO 8.5  2D73PM85

## (undated) DEVICE — DRSG ABDOMINAL 07 1/2X8" 7197D

## (undated) DEVICE — IMP HEAD FEMORAL DEPUY CERAMIC 36MM +1.5MM 1365-36-310
Type: IMPLANTABLE DEVICE | Site: HIP | Status: NON-FUNCTIONAL
Removed: 2023-05-30

## (undated) DEVICE — SU PDO 1 STRATAFIX 36X36CM CTX TAPERPOINT SXPD2B405

## (undated) DEVICE — BLADE SAW SAGITTAL STRK SHORT 35.5X9X0.64MM 2108-148-000

## (undated) DEVICE — BONE CLEANING TIP INTERPULSE FEMORAL CANAL 0210-008-000

## (undated) DEVICE — DRAPE POUCH INSTRUMENT 3 POCKET 1018L

## (undated) DEVICE — SU ETHIBOND 0 CT-1 CR 8X18" CX21D

## (undated) DEVICE — BONE CLEANING TIP INTERPULSE  0210-010-000

## (undated) DEVICE — GLOVE BIOGEL PI MICRO SZ 8.0 48580

## (undated) DEVICE — ESU BIPOLAR SEALER AQUAMANTYS 6MM 23-112-1

## (undated) DEVICE — DRAPE C-ARM 60X42" 1013

## (undated) DEVICE — SOL NACL 0.9% IRRIG 3000ML BAG 2B7477

## (undated) DEVICE — DRSG GAUZE 4X4" 3033

## (undated) DEVICE — BONE CEMENT PREP RESTRICTOR/BRUSH 121010

## (undated) DEVICE — SU MONOCRYL 3-0 PS-2 27" Y427H

## (undated) DEVICE — BLADE SAW SAGITTAL STRK 25X90X1.37MM 4H SYS 6 6125-137-090

## (undated) DEVICE — SYR BULB IRRIG DOVER 60 ML LATEX FREE 67000

## (undated) DEVICE — SU MONOCRYL 3-0 SH 27" Y316H

## (undated) DEVICE — DRAPE SHEET REV FOLD 3/4 9349

## (undated) DEVICE — LINEN TOWEL PACK X5 5464

## (undated) DEVICE — ESU PENCIL SMOKE EVAC W/ROCKER SWITCH 0703-047-000

## (undated) DEVICE — SU VICRYL 3-0 PS-1 18" UND J683

## (undated) DEVICE — BLADE KNIFE SURG 15 371115

## (undated) DEVICE — PREP CHLORAPREP 26ML TINTED ORANGE  260815

## (undated) DEVICE — GLOVE BIOGEL PI MICRO INDICATOR UNDERGLOVE SZ 7.5 48975

## (undated) DEVICE — PREP DURAPREP 26ML APL 8630

## (undated) DEVICE — SU DERMABOND ADVANCED .7ML DNX12

## (undated) DEVICE — NDL COUNTER 20CT 31142493

## (undated) DEVICE — MAT SURGICAL FLOOR ABSORBANT 40X36" WHITE 5006W

## (undated) DEVICE — SUCTION IRR SYSTEM W/O TIP INTERPULSE HANDPIECE 0210-100-000

## (undated) DEVICE — DRSG AQUACEL AG 3.5X9.75" HYDROFIBER 412011

## (undated) DEVICE — GLOVE PROTEXIS W/NEU-THERA 7.5  2D73TE75

## (undated) DEVICE — ESU HOLSTER PLASTIC DISP E2400

## (undated) DEVICE — DRAPE U SPLIT 74X120" 29440

## (undated) DEVICE — HOOD T4 PROTECTIVE STERI FACE SHIELD 400-800

## (undated) DEVICE — SU VICRYL 0 CT-1 36" J946H

## (undated) DEVICE — BONE CEMENT MIXEVAC HI VAC W/CARTRIDGE 0306-563-000

## (undated) DEVICE — SUCTION CANISTER MEDIVAC LINER 3000ML W/LID 65651-530

## (undated) DEVICE — SU ETHIBOND 1 CT-1 30" X425H

## (undated) DEVICE — CAST PADDING 4" STERILE 9044S

## (undated) DEVICE — CAST PLASTER SPLINT 5X30" 7395

## (undated) DEVICE — SPONGE SURGIFOAM 100 1974

## (undated) DEVICE — PACK EXTREMITY SOP15EXFSD

## (undated) DEVICE — WAND ESURG WEREWOLF FASTSEAL 6 HMSTS STRL LF DISP 72290042

## (undated) DEVICE — GLOVE BIOGEL PI MICRO INDICATOR UNDERGLOVE SZ 8.0 48980

## (undated) DEVICE — IMM LIMB ELEVATOR DC40-0203

## (undated) DEVICE — BNDG ELASTIC 4" DBL LENGTH UNSTERILE 6611-14

## (undated) DEVICE — ESU ELEC BLADE 6" COATED E1450-6

## (undated) DEVICE — DEVICE RETRIEVER HEWSON 71111579

## (undated) DEVICE — STPL SKIN 35W 059037

## (undated) DEVICE — BLADE SAW SAGITTAL STRK 18X90X1.27MM HD SYS 6 6118-127-090

## (undated) DEVICE — DRSG DRAIN 4X4" 7086

## (undated) DEVICE — GOWN XLG DISP 9545

## (undated) DEVICE — CAST PADDING 4" UNSTERILE 9044

## (undated) DEVICE — ESU ELEC BLADE 4" COATED

## (undated) DEVICE — DRSG ADAPTIC 3X8" 6113

## (undated) DEVICE — POUCH FLUID LARGE 20X19IN 20X3.75IN DRAPE D1016

## (undated) DEVICE — GLOVE PROTEXIS W/NEU-THERA 8.0  2D73TE80

## (undated) DEVICE — GLOVE PROTEXIS W/NEU-THERA 8.5  2D73TE85

## (undated) RX ORDER — PROPOFOL 10 MG/ML
INJECTION, EMULSION INTRAVENOUS
Status: DISPENSED
Start: 2023-05-30

## (undated) RX ORDER — ONDANSETRON 2 MG/ML
INJECTION INTRAMUSCULAR; INTRAVENOUS
Status: DISPENSED
Start: 2023-05-30

## (undated) RX ORDER — EPINEPHRINE 1 MG/ML
INJECTION, SOLUTION, CONCENTRATE INTRAVENOUS
Status: DISPENSED
Start: 2023-08-22

## (undated) RX ORDER — GABAPENTIN 300 MG/1
CAPSULE ORAL
Status: DISPENSED
Start: 2023-08-22

## (undated) RX ORDER — ROPIVACAINE HYDROCHLORIDE 5 MG/ML
INJECTION, SOLUTION EPIDURAL; INFILTRATION; PERINEURAL
Status: DISPENSED
Start: 2023-08-22

## (undated) RX ORDER — VANCOMYCIN HYDROCHLORIDE 1 G/20ML
INJECTION, POWDER, LYOPHILIZED, FOR SOLUTION INTRAVENOUS
Status: DISPENSED
Start: 2023-08-22

## (undated) RX ORDER — MAGNESIUM SULFATE HEPTAHYDRATE 40 MG/ML
INJECTION, SOLUTION INTRAVENOUS
Status: DISPENSED
Start: 2023-08-22

## (undated) RX ORDER — GABAPENTIN 300 MG/1
CAPSULE ORAL
Status: DISPENSED
Start: 2023-05-30

## (undated) RX ORDER — VANCOMYCIN HYDROCHLORIDE 1 G/20ML
INJECTION, POWDER, LYOPHILIZED, FOR SOLUTION INTRAVENOUS
Status: DISPENSED
Start: 2023-05-30

## (undated) RX ORDER — ACETAMINOPHEN 325 MG/1
TABLET ORAL
Status: DISPENSED
Start: 2023-05-30

## (undated) RX ORDER — ONDANSETRON 2 MG/ML
INJECTION INTRAMUSCULAR; INTRAVENOUS
Status: DISPENSED
Start: 2017-11-20

## (undated) RX ORDER — ONDANSETRON 2 MG/ML
INJECTION INTRAMUSCULAR; INTRAVENOUS
Status: DISPENSED
Start: 2023-08-22

## (undated) RX ORDER — FENTANYL CITRATE 50 UG/ML
INJECTION, SOLUTION INTRAMUSCULAR; INTRAVENOUS
Status: DISPENSED
Start: 2017-11-20

## (undated) RX ORDER — DEXMEDETOMIDINE HYDROCHLORIDE 100 UG/ML
INJECTION, SOLUTION INTRAVENOUS
Status: DISPENSED
Start: 2023-08-22

## (undated) RX ORDER — PROPOFOL 10 MG/ML
INJECTION, EMULSION INTRAVENOUS
Status: DISPENSED
Start: 2017-11-20

## (undated) RX ORDER — FENTANYL CITRATE 50 UG/ML
INJECTION, SOLUTION INTRAMUSCULAR; INTRAVENOUS
Status: DISPENSED
Start: 2023-08-22

## (undated) RX ORDER — FENTANYL CITRATE 50 UG/ML
INJECTION, SOLUTION INTRAMUSCULAR; INTRAVENOUS
Status: DISPENSED
Start: 2023-05-30

## (undated) RX ORDER — EPINEPHRINE 1 MG/ML
INJECTION, SOLUTION, CONCENTRATE INTRAVENOUS
Status: DISPENSED
Start: 2023-05-30

## (undated) RX ORDER — TRANEXAMIC ACID 650 MG/1
TABLET ORAL
Status: DISPENSED
Start: 2023-05-30

## (undated) RX ORDER — CEFAZOLIN SODIUM 1 G/3ML
INJECTION, POWDER, FOR SOLUTION INTRAMUSCULAR; INTRAVENOUS
Status: DISPENSED
Start: 2023-05-30

## (undated) RX ORDER — LIDOCAINE HCL/EPINEPHRINE/PF 2%-1:200K
VIAL (ML) INJECTION
Status: DISPENSED
Start: 2023-08-22

## (undated) RX ORDER — DEXAMETHASONE SODIUM PHOSPHATE 4 MG/ML
INJECTION, SOLUTION INTRA-ARTICULAR; INTRALESIONAL; INTRAMUSCULAR; INTRAVENOUS; SOFT TISSUE
Status: DISPENSED
Start: 2023-08-22

## (undated) RX ORDER — DEXAMETHASONE SODIUM PHOSPHATE 10 MG/ML
INJECTION, SOLUTION INTRAMUSCULAR; INTRAVENOUS
Status: DISPENSED
Start: 2023-08-22

## (undated) RX ORDER — CEFAZOLIN SODIUM 2 G/100ML
INJECTION, SOLUTION INTRAVENOUS
Status: DISPENSED
Start: 2017-11-20

## (undated) RX ORDER — PROPOFOL 10 MG/ML
INJECTION, EMULSION INTRAVENOUS
Status: DISPENSED
Start: 2023-08-22

## (undated) RX ORDER — FENTANYL CITRATE-0.9 % NACL/PF 10 MCG/ML
PLASTIC BAG, INJECTION (ML) INTRAVENOUS
Status: DISPENSED
Start: 2023-08-22

## (undated) RX ORDER — GLYCOPYRROLATE 0.2 MG/ML
INJECTION, SOLUTION INTRAMUSCULAR; INTRAVENOUS
Status: DISPENSED
Start: 2023-08-22

## (undated) RX ORDER — TRANEXAMIC ACID 650 MG/1
TABLET ORAL
Status: DISPENSED
Start: 2023-08-22

## (undated) RX ORDER — LIDOCAINE HYDROCHLORIDE 20 MG/ML
INJECTION, SOLUTION EPIDURAL; INFILTRATION; INTRACAUDAL; PERINEURAL
Status: DISPENSED
Start: 2017-11-20

## (undated) RX ORDER — EPHEDRINE SULFATE 50 MG/ML
INJECTION, SOLUTION INTRAMUSCULAR; INTRAVENOUS; SUBCUTANEOUS
Status: DISPENSED
Start: 2023-05-30

## (undated) RX ORDER — ACETAMINOPHEN 325 MG/1
TABLET ORAL
Status: DISPENSED
Start: 2023-08-22